# Patient Record
Sex: MALE | Race: WHITE | NOT HISPANIC OR LATINO | Employment: FULL TIME | URBAN - METROPOLITAN AREA
[De-identification: names, ages, dates, MRNs, and addresses within clinical notes are randomized per-mention and may not be internally consistent; named-entity substitution may affect disease eponyms.]

---

## 2018-01-27 ENCOUNTER — OFFICE VISIT (OUTPATIENT)
Dept: FAMILY MEDICINE | Facility: CLINIC | Age: 28
End: 2018-01-27

## 2018-01-27 VITALS
BODY MASS INDEX: 27.8 KG/M2 | HEIGHT: 70 IN | SYSTOLIC BLOOD PRESSURE: 126 MMHG | DIASTOLIC BLOOD PRESSURE: 86 MMHG | WEIGHT: 194.2 LBS | HEART RATE: 79 BPM | TEMPERATURE: 98.5 F | RESPIRATION RATE: 18 BRPM

## 2018-01-27 DIAGNOSIS — F33.1 MODERATE EPISODE OF RECURRENT MAJOR DEPRESSIVE DISORDER (H): Primary | ICD-10-CM

## 2018-01-27 DIAGNOSIS — R03.0 ELEVATED BLOOD-PRESSURE READING WITHOUT DIAGNOSIS OF HYPERTENSION: ICD-10-CM

## 2018-01-27 PROCEDURE — 99213 OFFICE O/P EST LOW 20 MIN: CPT | Performed by: FAMILY MEDICINE

## 2018-01-27 RX ORDER — CHLORAL HYDRATE 500 MG
CAPSULE ORAL
COMMUNITY

## 2018-01-27 RX ORDER — FLUOXETINE 40 MG/1
40 CAPSULE ORAL DAILY
Qty: 30 CAPSULE | Refills: 0 | Status: SHIPPED | OUTPATIENT
Start: 2018-01-27 | End: 2018-02-22

## 2018-01-27 ASSESSMENT — PATIENT HEALTH QUESTIONNAIRE - PHQ9
10. IF YOU CHECKED OFF ANY PROBLEMS, HOW DIFFICULT HAVE THESE PROBLEMS MADE IT FOR YOU TO DO YOUR WORK, TAKE CARE OF THINGS AT HOME, OR GET ALONG WITH OTHER PEOPLE: VERY DIFFICULT
SUM OF ALL RESPONSES TO PHQ QUESTIONS 1-9: 10
SUM OF ALL RESPONSES TO PHQ QUESTIONS 1-9: 10

## 2018-01-27 ASSESSMENT — ANXIETY QUESTIONNAIRES
4. TROUBLE RELAXING: SEVERAL DAYS
GAD7 TOTAL SCORE: 7
GAD7 TOTAL SCORE: 7
5. BEING SO RESTLESS THAT IT IS HARD TO SIT STILL: NOT AT ALL
1. FEELING NERVOUS, ANXIOUS, OR ON EDGE: SEVERAL DAYS
GAD7 TOTAL SCORE: 7
7. FEELING AFRAID AS IF SOMETHING AWFUL MIGHT HAPPEN: SEVERAL DAYS
7. FEELING AFRAID AS IF SOMETHING AWFUL MIGHT HAPPEN: SEVERAL DAYS
6. BECOMING EASILY ANNOYED OR IRRITABLE: MORE THAN HALF THE DAYS
3. WORRYING TOO MUCH ABOUT DIFFERENT THINGS: SEVERAL DAYS
2. NOT BEING ABLE TO STOP OR CONTROL WORRYING: SEVERAL DAYS

## 2018-01-27 NOTE — NURSING NOTE
2nd blood pressure reading 126/86, right arm, sitting in chair, regular sized cuff  Patient instructed to follow up as directed by MD Jon Niño, MA

## 2018-01-27 NOTE — MR AVS SNAPSHOT
After Visit Summary   1/27/2018    Diogenes Cody    MRN: [id number]           Patient Information     Date Of Birth          1990        Visit Information        Provider Department      1/27/2018 11:20 AM Kyung Ryan MD [facility]        Today's Diagnoses     Moderate episode of recurrent major depressive disorder (H)    -  1    Elevated blood-pressure reading without diagnosis of hypertension          Care Instructions      Eating Heart-Healthy Foods  Eating has a big impact on your heart health. In fact, eating healthier can improve several of your heart risks at once. For instance, it helps you manage weight, cholesterol, and blood pressure. Here are ideas to help you make heart-healthy changes without giving up all the foods and flavors you love.  Getting started    Talk with your health care provider about eating plans, such as the DASH or Mediterranean diet. You may also be referred to a dietitian.    Change a few things at a time. Give yourself time to get used to a few eating changes before adding more.    Work to create a tasty, healthy eating plan that you can stick to for the rest of your life.    Goals for healthy eating  Below are some tips to improve your eating habits:    Limit saturated fats and trans fats. Saturated fats raise your levels of cholesterol, so keep these fats to a minimum. They are found in foods such as fatty meats, whole milk, cheese, and palm and coconut oils. Avoid trans fats because they lower good cholesterol as well as raise bad cholesterol. Trans fats are most often found in processed foods.    Reduce sodium (salt) intake. Eating too much salt may increase your blood pressure. Limit your sodium intake to 2,300 milligrams (mg) per day, or less if your health care provider recommends it. Dining out less often and eating fewer processed foods are two great ways to decrease the amount of salt you consume.    Managing calories. A  calorie is a unit of energy. Your body burns calories for fuel, but if you eat more calories than your body burns, the extras are stored as fat. Your health care provider can help you create a diet plan to manage your calories. This will likely include eating healthier foods as well as exercising regularly. To help you track your progress, keep a diary to record what you eat and how often you exercise.  Choose the right foods  Aim to make these foods staples of your diet. If you have diabetes, you may have different recommendations than what is listed here:    Fruits and vegetable provide plenty of nutrients without a lot of calories. At meals, fill half your plate with these foods. Split the other half of your plate between whole grains and lean protein.    Whole grains are high in fiber and rich in vitamins and nutrients. Good choices include whole-wheat bread, pasta, and brown rice.    Lean proteins give you nutrition with less fat. Good choices include fish, skinless chicken, and beans.    Low-fat or nonfat dairy provides nutrients without a lot of fat. Try low-fat or nonfat milk, cheese, or yogurt.    Healthy fats can be good for you in small amounts. These are unsaturated fats, such as olive oil, nuts, and fish. Try to have at least 2 servings per week of fatty fish such as salmon, sardines, mackerel, rainbow trout, and albacore tuna. These contain omega-3 fatty acids, which are good for your heart. Flaxseed is another source of a heart-healthy fat.  More on heart healthy eating    Read food labels  Healthy eating starts at the grocery store. Be sure to pay attention to food labels on packaged foods. Look for products that are high in fiber and protein, and low in saturated fat, cholesterol, and sodium. Avoid products that contain trans fat. And pay close attention to serving size. For instance, if you plan to eat two servings, double all the numbers on the label.  Prepare food right  A key part of healthy  cooking is cutting down on added fat and salt. Look on the internet for lower-fat, lower-sodium recipes. Also, try these tips:    Remove fat from meat and skin from poultry before cooking.    Skim fat from the surface of soups and sauces.    Broil, boil, bake, steam, grill, and microwave food without added fats.    Choose ingredients that spice up your food without adding calories, fat, or sodium. Try these items: horseradish, hot sauce, lemon, mustard, nonfat salad dressings, and vinegar. For salt-free herbs and spices, try basil, cilantro, cinnamon, pepper, and rosemary.  Date Last Reviewed: 6/25/2015 2000-2017 [employer]. [address] [place] [address]. All rights reserved. This information is not intended as a substitute for professional medical care. Always follow your healthcare professional's instructions.                Follow-ups after your visit        Follow-up notes from your care team     Return in about 2 weeks (around 2/10/2018).      Who to contact     If you have questions or need follow up information about today's clinic visit or your schedule please contact [facility] directly at [phone].  Normal or non-critical lab and imaging results will be communicated to you by [employer], letter or phone within 4 business days after the clinic has received the results. If you do not hear from us within 7 days, please contact the clinic through [employer] or phone. If you have a critical or abnormal lab result, we will notify you by phone as soon as possible.  Submit refill requests through [employer] or call your pharmacy and they will forward the refill request to us. Please allow 3 business days for your refill to be completed.          Additional Information About Your Visit        [employer] Information     [employer] lets you send messages to your doctor, view your test results, renew your prescriptions, schedule appointments and more. To sign up, go to  [email or web address] . Click on \"Log in\" on the left side of the screen, which will take you to the Welcome page. Then click on \"Sign up Now\" on the right side of the page.     You will be asked to enter the access code listed below, as well as some personal information. Please follow the directions to create your username and password.     Your access code is: [email or web address]  Expires: 2018 11:50 AM     Your access code will  in 90 days. If you need help or a new code, please call your [place] [facility] clinic or [phone].        Care EveryWhere ID     This is your Care EveryWhere ID. This could be used by other organizations to access your [facility] medical records  [id number]        Your Vitals Were     Pulse Temperature Respirations Height BMI (Body Mass Index)       79 98.5  F (36.9  C) (Oral) 18 5' 9.5\" (1.765 m) 28.27 kg/m2        Blood Pressure from Last 3 Encounters:   18 140/90   14 126/85    Weight from Last 3 Encounters:   18 194 lb 3.2 oz (88.1 kg)              Today, you had the following     No orders found for display         Today's Medication Changes          These changes are accurate as of 18 11:50 AM.  If you have any questions, ask your nurse or doctor.               These medicines have changed or have updated prescriptions.        Dose/Directions    * FLUoxetine 20 MG capsule   Commonly known as:  PROzac   This may have changed:  Another medication with the same name was added. Make sure you understand how and when to take each.   Changed by:  Kyung Ryan MD        TAKE 1 CAPSULE BY MOUTH DAILY   Refills:  8       * FLUoxetine 40 MG capsule   Commonly known as:  PROzac   This may have changed:  You were already taking a medication with the same name, and this prescription was added. Make sure you understand how and when to take each.   Used for:  Moderate episode of recurrent major depressive disorder (H)   Changed by:  Kyung Ryan MD     "    Dose:  40 mg   Take 1 capsule (40 mg) by mouth daily   Quantity:  30 capsule   Refills:  0       * Notice:  This list has 2 medication(s) that are the same as other medications prescribed for you. Read the directions carefully, and ask your doctor or other care provider to review them with you.         Where to get your medicines      These medications were sent to [place] [address] [place] [facility] [id number] [facility] [address]    Hours:  Tech issues with their phone system Phone:  [phone]     FLUoxetine 40 MG capsule                Primary Care Provider Fax #    Physician No Ref-Primary [phone]       No address on file        Equal Access to Services     : Hadliu Chowdhury, simba bermeo, rod tsealmajassi lucas, brandon fritz . So [facility] [phone].    ATENCIÓN: Si habla español, tiene a santiago disposición servicios gratuitos de asistencia lingüística. [facility] [phone].    We comply with applicable federal civil rights laws and [place] laws. We do not discriminate on the basis of race, color, national origin, age, disability, sex, sexual orientation, or gender identity.            Thank you!     Thank you for choosing [facility]  for your care. Our goal is always to provide you with excellent care. Hearing back from our patients is one way we can continue to improve our services. Please take a few minutes to complete the written survey that you may receive in the mail after your visit with us. Thank you!             Your Updated Medication List - Protect others around you: Learn how to safely use, store and throw away your medicines at www.disposemymeds.org.          This list is accurate as of 1/27/18 11:50 AM.  Always use your most recent med list.                   Brand Name Dispense Instructions for use Diagnosis    * FLUoxetine 20 MG capsule    PROzac     TAKE 1 CAPSULE BY MOUTH  DAILY        * FLUoxetine 40 MG capsule    PROzac    30 capsule    Take 1 capsule (40 mg) by mouth daily    Moderate episode of recurrent major depressive disorder (H)       * OMEGA-3 FISH OIL PO           * fish oil-omega-3 fatty acids 1000 MG capsule           OMEPRAZOLE PO           * Notice:  This list has 4 medication(s) that are the same as other medications prescribed for you. Read the directions carefully, and ask your doctor or other care provider to review them with you.

## 2018-01-27 NOTE — NURSING NOTE
"Chief Complaint   Patient presents with     Recheck Medication       Initial /90 (BP Location: Right arm, Patient Position: Chair, Cuff Size: Adult Regular)  Pulse 79  Temp 98.5  F (36.9  C) (Oral)  Resp 18  Ht 5' 9.5\" (1.765 m)  Wt 194 lb 3.2 oz (88.1 kg)  BMI 28.27 kg/m2 Estimated body mass index is 28.27 kg/(m^2) as calculated from the following:    Height as of this encounter: 5' 9.5\" (1.765 m).    Weight as of this encounter: 194 lb 3.2 oz (88.1 kg).  Medication Reconciliation: complete      Health Maintenance addressed:  NONE    N/a    SERAFIN Martinez        "

## 2018-01-27 NOTE — PROGRESS NOTES
"  SUBJECTIVE:   Diogenes Cody is a 27 year old male who presents to clinic today for the following health issues:      Answers for HPI/ROS submitted by the patient on 1/27/2018   Chronic problems general questions HPI Form  Depression/Anxiety: Depression only  Status since last visit:: Stable  Other associated symptoms of depression:: None  Significant life event:: No  Current substance use:: Alcohol, Other  Anxiety/Panic symptoms:: No  If you checked off any problems, how difficult have these problems made it for you to do your work, take care of things at home, or get along with other people?: Very difficult  PHQ9 TOTAL SCORE: 10  RADHA 7 TOTAL SCORE: 7      Medication Followup of prozac    Taking Medication as prescribed: yes    Side Effects:  None     Medication Helping Symptoms:  yes   Dad has a drinking problem, he feels he is self medicating for depression. His mom and sister take medication for depression.   Was told no more refills, unless he comes in. Wants to keep taking this med, he feels much better.    Problem list and histories reviewed & adjusted, as indicated.  Additional history: as documented    BP Readings from Last 3 Encounters:   01/27/18 126/86   11/08/14 126/85    Wt Readings from Last 3 Encounters:   01/27/18 194 lb 3.2 oz (88.1 kg)                  Labs reviewed in EPIC    Reviewed and updated as needed this visit by clinical staff  Tobacco  Allergies  Meds  Med Hx  Surg Hx  Fam Hx  Soc Hx      Reviewed and updated as needed this visit by Provider         ROS:  Constitutional, HEENT, cardiovascular, pulmonary, gi and gu systems are negative, except as otherwise noted.    OBJECTIVE:     /86 (BP Location: Right arm, Patient Position: Chair, Cuff Size: Adult Regular)  Pulse 79  Temp 98.5  F (36.9  C) (Oral)  Resp 18  Ht 5' 9.5\" (1.765 m)  Wt 194 lb 3.2 oz (88.1 kg)  BMI 28.27 kg/m2  Body mass index is 28.27 kg/(m^2).  GENERAL: healthy, alert and no distress  NECK: no " adenopathy, no asymmetry, masses, or scars and thyroid normal to palpation  RESP: lungs clear to auscultation - no rales, rhonchi or wheezes  CV: regular rate and rhythm, normal S1 S2, no S3 or S4, no murmur, click or rub, no peripheral edema and peripheral pulses strong    MS: no gross musculoskeletal defects noted, no edema    Diagnostic Test Results:  none     ASSESSMENT/PLAN:             1. Moderate episode of recurrent major depressive disorder (H)  Refilled, doing well  - FLUoxetine (PROZAC) 40 MG capsule; Take 1 capsule (40 mg) by mouth daily  Dispense: 30 capsule; Refill: 0    2. Elevated blood-pressure reading without diagnosis of hypertension  Diet , exercise, weight loss recommended, recheck in 2 wks      See Patient Instructions    Kyung Ryan MD  [facility]

## 2018-01-27 NOTE — PATIENT INSTRUCTIONS
Eating Heart-Healthy Foods  Eating has a big impact on your heart health. In fact, eating healthier can improve several of your heart risks at once. For instance, it helps you manage weight, cholesterol, and blood pressure. Here are ideas to help you make heart-healthy changes without giving up all the foods and flavors you love.  Getting started    Talk with your health care provider about eating plans, such as the DASH or Mediterranean diet. You may also be referred to a dietitian.    Change a few things at a time. Give yourself time to get used to a few eating changes before adding more.    Work to create a tasty, healthy eating plan that you can stick to for the rest of your life.    Goals for healthy eating  Below are some tips to improve your eating habits:    Limit saturated fats and trans fats. Saturated fats raise your levels of cholesterol, so keep these fats to a minimum. They are found in foods such as fatty meats, whole milk, cheese, and palm and coconut oils. Avoid trans fats because they lower good cholesterol as well as raise bad cholesterol. Trans fats are most often found in processed foods.    Reduce sodium (salt) intake. Eating too much salt may increase your blood pressure. Limit your sodium intake to 2,300 milligrams (mg) per day, or less if your health care provider recommends it. Dining out less often and eating fewer processed foods are two great ways to decrease the amount of salt you consume.    Managing calories. A calorie is a unit of energy. Your body burns calories for fuel, but if you eat more calories than your body burns, the extras are stored as fat. Your health care provider can help you create a diet plan to manage your calories. This will likely include eating healthier foods as well as exercising regularly. To help you track your progress, keep a diary to record what you eat and how often you exercise.  Choose the right foods  Aim to make these foods staples of your diet. If  you have diabetes, you may have different recommendations than what is listed here:    Fruits and vegetable provide plenty of nutrients without a lot of calories. At meals, fill half your plate with these foods. Split the other half of your plate between whole grains and lean protein.    Whole grains are high in fiber and rich in vitamins and nutrients. Good choices include whole-wheat bread, pasta, and brown rice.    Lean proteins give you nutrition with less fat. Good choices include fish, skinless chicken, and beans.    Low-fat or nonfat dairy provides nutrients without a lot of fat. Try low-fat or nonfat milk, cheese, or yogurt.    Healthy fats can be good for you in small amounts. These are unsaturated fats, such as olive oil, nuts, and fish. Try to have at least 2 servings per week of fatty fish such as salmon, sardines, mackerel, rainbow trout, and albacore tuna. These contain omega-3 fatty acids, which are good for your heart. Flaxseed is another source of a heart-healthy fat.  More on heart healthy eating    Read food labels  Healthy eating starts at the grocery store. Be sure to pay attention to food labels on packaged foods. Look for products that are high in fiber and protein, and low in saturated fat, cholesterol, and sodium. Avoid products that contain trans fat. And pay close attention to serving size. For instance, if you plan to eat two servings, double all the numbers on the label.  Prepare food right  A key part of healthy cooking is cutting down on added fat and salt. Look on the internet for lower-fat, lower-sodium recipes. Also, try these tips:    Remove fat from meat and skin from poultry before cooking.    Skim fat from the surface of soups and sauces.    Broil, boil, bake, steam, grill, and microwave food without added fats.    Choose ingredients that spice up your food without adding calories, fat, or sodium. Try these items: horseradish, hot sauce, lemon, mustard, nonfat salad dressings,  and vinegar. For salt-free herbs and spices, try basil, cilantro, cinnamon, pepper, and rosemary.  Date Last Reviewed: 6/25/2015 2000-2017 [employer]. [address] [place] [address], [place] [address]. All rights reserved. This information is not intended as a substitute for professional medical care. Always follow your healthcare professional's instructions.    Call for refill in 30 days and recheck PHQ-9

## 2018-01-28 ASSESSMENT — PATIENT HEALTH QUESTIONNAIRE - PHQ9: SUM OF ALL RESPONSES TO PHQ QUESTIONS 1-9: 10

## 2018-01-28 ASSESSMENT — ANXIETY QUESTIONNAIRES: GAD7 TOTAL SCORE: 7

## 2018-02-22 DIAGNOSIS — F33.1 MODERATE EPISODE OF RECURRENT MAJOR DEPRESSIVE DISORDER (H): ICD-10-CM

## 2018-02-23 RX ORDER — FLUOXETINE 40 MG/1
CAPSULE ORAL
Qty: 30 CAPSULE | Refills: 0 | Status: SHIPPED | OUTPATIENT
Start: 2018-02-23 | End: 2018-02-26

## 2018-02-23 NOTE — TELEPHONE ENCOUNTER
PHQ-9 SCORE 1/27/2018   Total Score MyChart 10 (Moderate depression)   Total Score 10     Routing refill request to provider for review/approval because:  PHQ-9 > 4.  Amaya Duncan RN

## 2018-02-23 NOTE — TELEPHONE ENCOUNTER
"Last Written Prescription Date:  12/22/17 (Historical)  Last Fill Quantity:  Na ,  # refills: 8   Last office visit: 1/27/2018 with prescribing provider:  Shelby     Future Office Visit:    Requested Prescriptions   Pending Prescriptions Disp Refills     FLUoxetine (PROZAC) 40 MG capsule [Pharmacy Med Name: FLUOXETINE HCL 40 MG CAPSULE] 30 capsule 0     Sig: TAKE 1 CAPSULE (40 MG) BY MOUTH DAILY    SSRIs Protocol Failed    2/23/2018  9:20 AM       Failed - PHQ-9 score less than 5 in past 6 months    The PHQ-9 criteria is meant to fail. It requires a PHQ-9 score review         Passed - Patient is age 18 or older       Passed - Recent (6 mo) or future visit with authorizing provider's specialty    Patient had office visit in the last 6 months or has a visit in the next 30 days with authorizing provider.  See \"Patient Info\" tab in inbasket, or \"Choose Columns\" in Meds & Orders section of the refill encounter.              "

## 2018-02-26 ENCOUNTER — OFFICE VISIT (OUTPATIENT)
Dept: FAMILY MEDICINE | Facility: CLINIC | Age: 28
End: 2018-02-26
Payer: MEDICAID

## 2018-02-26 VITALS
HEART RATE: 79 BPM | RESPIRATION RATE: 20 BRPM | DIASTOLIC BLOOD PRESSURE: 84 MMHG | WEIGHT: 208.3 LBS | OXYGEN SATURATION: 97 % | BODY MASS INDEX: 30.32 KG/M2 | SYSTOLIC BLOOD PRESSURE: 126 MMHG | TEMPERATURE: 98 F

## 2018-02-26 DIAGNOSIS — R74.8 ELEVATED LIVER ENZYMES: ICD-10-CM

## 2018-02-26 DIAGNOSIS — F33.1 MODERATE EPISODE OF RECURRENT MAJOR DEPRESSIVE DISORDER (H): Primary | ICD-10-CM

## 2018-02-26 DIAGNOSIS — R03.0 ELEVATED BLOOD-PRESSURE READING WITHOUT DIAGNOSIS OF HYPERTENSION: ICD-10-CM

## 2018-02-26 DIAGNOSIS — E66.9 OBESITY WITHOUT SERIOUS COMORBIDITY, UNSPECIFIED CLASSIFICATION, UNSPECIFIED OBESITY TYPE: ICD-10-CM

## 2018-02-26 LAB
ALBUMIN SERPL-MCNC: 3.9 G/DL (ref 3.4–5)
ALP SERPL-CCNC: 81 U/L (ref 40–150)
ALT SERPL W P-5'-P-CCNC: 116 U/L (ref 0–70)
ANION GAP SERPL CALCULATED.3IONS-SCNC: 5 MMOL/L (ref 3–14)
AST SERPL W P-5'-P-CCNC: 46 U/L (ref 0–45)
BILIRUB SERPL-MCNC: 0.7 MG/DL (ref 0.2–1.3)
BUN SERPL-MCNC: 10 MG/DL (ref 7–30)
CALCIUM SERPL-MCNC: 8.8 MG/DL (ref 8.5–10.1)
CHLORIDE SERPL-SCNC: 108 MMOL/L (ref 94–109)
CHOLEST SERPL-MCNC: 230 MG/DL
CO2 SERPL-SCNC: 28 MMOL/L (ref 20–32)
CREAT SERPL-MCNC: 0.9 MG/DL (ref 0.66–1.25)
ERYTHROCYTE [DISTWIDTH] IN BLOOD BY AUTOMATED COUNT: 14.5 % (ref 10–15)
GFR SERPL CREATININE-BSD FRML MDRD: >90 ML/MIN/1.7M2
GLUCOSE SERPL-MCNC: 78 MG/DL (ref 70–99)
HCT VFR BLD AUTO: 48.7 % (ref 40–53)
HDLC SERPL-MCNC: 46 MG/DL
HGB BLD-MCNC: 16.2 G/DL (ref 13.3–17.7)
LDLC SERPL CALC-MCNC: 148 MG/DL
MCH RBC QN AUTO: 29 PG (ref 26.5–33)
MCHC RBC AUTO-ENTMCNC: 33.3 G/DL (ref 31.5–36.5)
MCV RBC AUTO: 87 FL (ref 78–100)
NONHDLC SERPL-MCNC: 184 MG/DL
PLATELET # BLD AUTO: 206 10E9/L (ref 150–450)
POTASSIUM SERPL-SCNC: 4.3 MMOL/L (ref 3.4–5.3)
PROT SERPL-MCNC: 7.1 G/DL (ref 6.8–8.8)
RBC # BLD AUTO: 5.58 10E12/L (ref 4.4–5.9)
SODIUM SERPL-SCNC: 141 MMOL/L (ref 133–144)
TRIGL SERPL-MCNC: 178 MG/DL
TSH SERPL DL<=0.005 MIU/L-ACNC: 0.97 MU/L (ref 0.4–4)
WBC # BLD AUTO: 5.5 10E9/L (ref 4–11)

## 2018-02-26 PROCEDURE — 80061 LIPID PANEL: CPT | Performed by: FAMILY MEDICINE

## 2018-02-26 PROCEDURE — 84443 ASSAY THYROID STIM HORMONE: CPT | Performed by: FAMILY MEDICINE

## 2018-02-26 PROCEDURE — 99214 OFFICE O/P EST MOD 30 MIN: CPT | Performed by: FAMILY MEDICINE

## 2018-02-26 PROCEDURE — 80053 COMPREHEN METABOLIC PANEL: CPT | Performed by: FAMILY MEDICINE

## 2018-02-26 PROCEDURE — 85027 COMPLETE CBC AUTOMATED: CPT | Performed by: FAMILY MEDICINE

## 2018-02-26 PROCEDURE — 36415 COLL VENOUS BLD VENIPUNCTURE: CPT | Performed by: FAMILY MEDICINE

## 2018-02-26 RX ORDER — FLUOXETINE 40 MG/1
CAPSULE ORAL
Qty: 90 CAPSULE | Refills: 1 | Status: SHIPPED | OUTPATIENT
Start: 2018-02-26 | End: 2018-07-18

## 2018-02-26 NOTE — NURSING NOTE
"Chief Complaint   Patient presents with     Recheck Medication     prozac       Initial /84 (BP Location: Right arm, Patient Position: Chair, Cuff Size: Adult Large)  Pulse 79  Temp 98  F (36.7  C) (Oral)  Resp 20  Wt 208 lb 4.8 oz (94.5 kg)  SpO2 97%  BMI 30.32 kg/m2 Estimated body mass index is 30.32 kg/(m^2) as calculated from the following:    Height as of 1/27/18: 5' 9.5\" (1.765 m).    Weight as of this encounter: 208 lb 4.8 oz (94.5 kg).  Medication Reconciliation: complete   Cassidy Dunn CMA (AAMA)  "

## 2018-02-26 NOTE — MR AVS SNAPSHOT
After Visit Summary   2/26/2018    Diogenes Cody    MRN: [id number]           Patient Information     Date Of Birth          1990        Visit Information        Provider Department      2/26/2018 7:20 AM Kyung Ryan MD [facility]        Today's Diagnoses     Moderate episode of recurrent major depressive disorder (H)    -  1    Elevated blood-pressure reading without diagnosis of hypertension        Obesity without serious comorbidity, unspecified classification, unspecified obesity type          Care Instructions      Eating Heart-Healthy Food: Using the DASH Plan    Eating for your heart doesn t have to be hard or boring. You just need to know how to make healthier choices. The DASH eating plan has been developed to help you do just that. DASH stands for Dietary Approaches to Stop Hypertension. It is a plan that has been proven to be healthier for your heart and to lower your risk for high blood pressure. It can also help lower your risk for cancer, heart disease, osteoporosis, and diabetes.  Choosing from each food group  Choose foods from each of the food groups below each day. Try to get the recommended number of servings for each food group. The serving numbers are based on a diet of 2,000 calories a day. Talk to your doctor if you re unsure about your calorie needs. Along with getting the correct servings, the DASH plan also recommends a sodium intake less than 2,300 mg per day.        Grains  Servings: 6 to 8 a day  A serving is:    1 slice bread    1 ounce dry cereal    Half a cup cooked rice, pasta or cereal  Best choices: Whole grains and any grains high in fiber. Vegetables  Servings: 4 to 5 a day  A serving is:    1 cup raw leafy vegetable    Half a cup cut-up raw or cooked vegetable    Half a cup vegetable juice  Best choices: Fresh or frozen vegetables prepared without added salt or fat.   Fruits  Servings: 4 to 5 a day  A serving is:    1 medium  fruit    One-quarter cup dried fruit    Half a cup fresh, frozen, or canned fruit    Half a cup of 100% fruit juices  Best choices: A variety of fresh fruits of different colors. Whole fruits are a better choice than fruit juices. Low-fat or fat-free dairy  Servings: 2 to 3 a day  A serving is:    1 cup milk    1 cup yogurt    One and a half ounces cheese  Best choices: Skim or 1% milk, low-fat or fat-free yogurt or buttermilk, and low-fat cheeses.         Lean meats, poultry, fish  Servings: 6 or fewer a day  A serving is:    1 ounce cooked meats, poultry, or fish    1 egg  Best choices: Lean poultry and fish. Trim away visible fat. Broil, grill, roast, or boil instead of frying. Remove skin from poultry before eating. Limit how much red meat you eat.  Nuts, seeds, beans  Servings: 4 to 5 a week  A serving is:    One-third cup nuts (one and a half ounces)    2 tablespoons nut butter or seeds    Half a cup cooked dry beans or legumes  Best choices: Dry roasted nuts with no salt added, lentils, kidney beans, garbanzo beans, and whole do beans.   Fats and oils  Servings: 2 to 3 a day  A serving is:    1 teaspoon vegetable oil    1 teaspoon soft margarine    1 tablespoon mayonnaise    2 tablespoons salad dressing  Best choices: Nut and vegetable oils (nontropical vegetable oils), such as olive and canola oil. Sweets  Servings: 5 a week or fewer  A serving is:    1 tablespoon sugar, maple syrup, or honey    1 tablespoon jam or jelly    1 half-ounce jelly beans (about 15)    1 cup lemonade  Best choices: Dried fruit can be a satisfying sweet. Choose low-fat sweets. And watch your serving sizes!      For more on the DASH eating plan, visit:  www.nhlbi.nih.gov/health/health-topics/topics/dash   Date Last Reviewed: 6/1/2016 2000-2017 [employer]. [address] [place] [address], [place] [address]. All rights reserved. This information is not intended as a substitute for professional medical care. Always follow  your healthcare professional's instructions.                Follow-ups after your visit        Follow-up notes from your care team     Return in about 6 months (around 8/26/2018).      Who to contact     If you have questions or need follow up information about today's clinic visit or your schedule please contact [facility] directly at [phone].  Normal or non-critical lab and imaging results will be communicated to you by MyChart, letter or phone within 4 business days after the clinic has received the results. If you do not hear from us within 7 days, please contact the clinic through [employer] or phone. If you have a critical or abnormal lab result, we will notify you by phone as soon as possible.  Submit refill requests through [employer] or call your pharmacy and they will forward the refill request to us. Please allow 3 business days for your refill to be completed.          Additional Information About Your Visit        [employer] Information     [employer] gives you secure access to your electronic health record. If you see a primary care provider, you can also send messages to your care team and make appointments. If you have questions, please call your primary care clinic.  If you do not have a primary care provider, please call [phone] and they will assist you.        Care EveryWhere ID     This is your Care EveryWhere ID. This could be used by other organizations to access your [place] medical records  [id number]        Your Vitals Were     Pulse Temperature Respirations Pulse Oximetry BMI (Body Mass Index)       79 98  F (36.7  C) (Oral) 20 97% 30.32 kg/m2        Blood Pressure from Last 3 Encounters:   02/26/18 126/84   01/27/18 126/86   11/08/14 126/85    Weight from Last 3 Encounters:   02/26/18 208 lb 4.8 oz (94.5 kg)   01/27/18 194 lb 3.2 oz (88.1 kg)              We Performed the Following     CBC with platelets     Comprehensive metabolic panel     Lipid panel reflex to direct  LDL Fasting     TSH with free T4 reflex          Today's Medication Changes          These changes are accurate as of 2/26/18  7:47 AM.  If you have any questions, ask your nurse or doctor.               These medicines have changed or have updated prescriptions.        Dose/Directions    FLUoxetine 40 MG capsule   Commonly known as:  PROzac   This may have changed:  See the new instructions.   Used for:  Moderate episode of recurrent major depressive disorder (H)   Changed by:  Kyung Ryan MD        TAKE 1 CAPSULE (40 MG) BY MOUTH DAILY   Quantity:  90 capsule   Refills:  1            Where to get your medicines      These medications were sent to [place] [address] [place] [facility] [address] [facility]  [address] [facility] [address]    Hours:  Tech issues with their phone system Phone:  [phone]     FLUoxetine 40 MG capsule                Primary Care Provider Office Phone # Fax #    Kyung Ryan [phone]  [address]   [facility] [address]        Equal Access to Services     M[facility] Center[facility]MINIO AH: Hadii aad ku hadasho Soomaali, waaxda luqadaha, qaybta kaalmada adeegyada, waxay idiin hayaan jhony bonnerarajerry fritz . So [facility] [phone].    ATENCIÓN: Si habla español, tiene a santiago disposición servicios gratuitos de asistencia lingüística. Llame al [phone].    We comply with applicable federal civil rights laws and [place] laws. We do not discriminate on the basis of race, color, national origin, age, disability, sex, sexual orientation, or gender identity.            Thank you!     Thank you for choosing [facility]  for your care. Our goal is always to provide you with excellent care. Hearing back from our patients is one way we can continue to improve our services. Please take a few minutes to complete the written survey that you may receive in the mail after your visit with us. Thank you!             Your Updated Medication  List - Protect others around you: Learn how to safely use, store and throw away your medicines at www.disposemymeds.org.          This list is accurate as of 2/26/18  7:47 AM.  Always use your most recent med list.                   Brand Name Dispense Instructions for use Diagnosis    fish oil-omega-3 fatty acids 1000 MG capsule           FLUoxetine 40 MG capsule    PROzac    90 capsule    TAKE 1 CAPSULE (40 MG) BY MOUTH DAILY    Moderate episode of recurrent major depressive disorder (H)       OMEPRAZOLE PO

## 2018-02-26 NOTE — PATIENT INSTRUCTIONS
Eating Heart-Healthy Food: Using the DASH Plan    Eating for your heart doesn t have to be hard or boring. You just need to know how to make healthier choices. The DASH eating plan has been developed to help you do just that. DASH stands for Dietary Approaches to Stop Hypertension. It is a plan that has been proven to be healthier for your heart and to lower your risk for high blood pressure. It can also help lower your risk for cancer, heart disease, osteoporosis, and diabetes.  Choosing from each food group  Choose foods from each of the food groups below each day. Try to get the recommended number of servings for each food group. The serving numbers are based on a diet of 2,000 calories a day. Talk to your doctor if you re unsure about your calorie needs. Along with getting the correct servings, the DASH plan also recommends a sodium intake less than 2,300 mg per day.        Grains  Servings: 6 to 8 a day  A serving is:    1 slice bread    1 ounce dry cereal    Half a cup cooked rice, pasta or cereal  Best choices: Whole grains and any grains high in fiber. Vegetables  Servings: 4 to 5 a day  A serving is:    1 cup raw leafy vegetable    Half a cup cut-up raw or cooked vegetable    Half a cup vegetable juice  Best choices: Fresh or frozen vegetables prepared without added salt or fat.   Fruits  Servings: 4 to 5 a day  A serving is:    1 medium fruit    One-quarter cup dried fruit    Half a cup fresh, frozen, or canned fruit    Half a cup of 100% fruit juices  Best choices: A variety of fresh fruits of different colors. Whole fruits are a better choice than fruit juices. Low-fat or fat-free dairy  Servings: 2 to 3 a day  A serving is:    1 cup milk    1 cup yogurt    One and a half ounces cheese  Best choices: Skim or 1% milk, low-fat or fat-free yogurt or buttermilk, and low-fat cheeses.         Lean meats, poultry, fish  Servings: 6 or fewer a day  A serving is:    1 ounce cooked meats, poultry, or fish    1  egg  Best choices: Lean poultry and fish. Trim away visible fat. Broil, grill, roast, or boil instead of frying. Remove skin from poultry before eating. Limit how much red meat you eat.  Nuts, seeds, beans  Servings: 4 to 5 a week  A serving is:    One-third cup nuts (one and a half ounces)    2 tablespoons nut butter or seeds    Half a cup cooked dry beans or legumes  Best choices: Dry roasted nuts with no salt added, lentils, kidney beans, garbanzo beans, and whole do beans.   Fats and oils  Servings: 2 to 3 a day  A serving is:    1 teaspoon vegetable oil    1 teaspoon soft margarine    1 tablespoon mayonnaise    2 tablespoons salad dressing  Best choices: Nut and vegetable oils (nontropical vegetable oils), such as olive and canola oil. Sweets  Servings: 5 a week or fewer  A serving is:    1 tablespoon sugar, maple syrup, or honey    1 tablespoon jam or jelly    1 half-ounce jelly beans (about 15)    1 cup lemonade  Best choices: Dried fruit can be a satisfying sweet. Choose low-fat sweets. And watch your serving sizes!      For more on the DASH eating plan, visit:  www.nhlbi.nih.gov/health/health-topics/topics/dash   Date Last Reviewed: 6/1/2016 2000-2017 [employer]. [address] [place] [address], [place] [address]. All rights reserved. This information is not intended as a substitute for professional medical care. Always follow your healthcare professional's instructions.

## 2018-02-26 NOTE — PROGRESS NOTES
"  SUBJECTIVE:   Diogenes Cody is a 27 year old male who presents to clinic today for the following health issues:    Medication Followup of Prozac    Taking Medication as prescribed: yes    Side Effects:  None    Medication Helping Symptoms:  yes     Pt is here for Prozac med check. Says the medication is working well. No side effects noted. Has been on the medication for about 2-3 years now. At last appt, dose was increased from 20 to 40 mg. Pt reports no changes with dose increase in medication. Recently tried going off of Prozac for a few days and says that those days were \"miserable.\"    FHx  Does not believe he has a family history of high blood pressure.     Diet  Says that he generally has a good diet, but was on the road for a long trip last week and ate mostly fast food. Pt says he has not tried dieting or weight loss in the past, but is thinking of doing so now as he has noted his weight going up. Drinks whole milk and also drinks a lot of sodas (about 3-4 20 mL servings a day).     Blood pressure  BP slightly elevated in clinic today (126/84), and has been in the past as well. Pt accepts blood work today for further evaluation of elevated BP.     Smoking  Pt smokes E-cigarettes every day but has quit tobacco smoking.     Alcohol   Has a couple drinks a night.     SOC  Pt works full time. Has a flexible schedule that allows him to come to the clinic as needed.     PHQ-9: 1 (on feeling down, depressed, or hopeless), somewhat difficult.     PROBLEMS TO ADD ON...    Problem list and histories reviewed & adjusted, as indicated.  Additional history: as documented    BP Readings from Last 3 Encounters:   02/26/18 126/84   01/27/18 126/86   11/08/14 126/85    Wt Readings from Last 3 Encounters:   02/26/18 94.5 kg (208 lb 4.8 oz)   01/27/18 88.1 kg (194 lb 3.2 oz)         Labs reviewed in EPIC    Reviewed and updated as needed this visit by clinical staff  Allergies  Meds       Reviewed and updated as needed " this visit by Provider       ROS:  Constitutional, HEENT, cardiovascular, pulmonary, gi and gu systems are negative, except as otherwise noted.    NEGATIVE: chest pressure, chest pain.   POSITIVE: occasional, infrequent headaches.     This document serves as a record of the services and decisions personally performed and made by Kyung Ryan MD. It was created on his/her behalf by Chris Thibodeaux, a trained medical scribe. The creation of this document is based the provider's statements to the medical scribe.  Scribe Chris Thibodeaux 7:26 AM, February 26, 2018  OBJECTIVE:     /84 (BP Location: Right arm, Patient Position: Chair, Cuff Size: Adult Large)  Pulse 79  Temp 98  F (36.7  C) (Oral)  Resp 20  Wt 94.5 kg (208 lb 4.8 oz)  SpO2 97%  BMI 30.32 kg/m2  Body mass index is 30.32 kg/(m^2).  GENERAL: healthy, alert and no distress  EYES: Eyes grossly normal to inspection, conjunctivae and sclerae normal  RESP: lungs clear to auscultation - no rales, rhonchi or wheezes  CV: regular rate and rhythm, normal S1 S2, no S3 or S4, no murmur, click or rub, no peripheral edema and peripheral pulses strong  MS: no gross musculoskeletal defects noted, no edema  SKIN: no suspicious lesions or rashes  NEURO: Normal strength and tone, mentation intact and speech normal  PSYCH: mentation appears normal, affect normal/bright    Diagnostic Test Results:  none     ASSESSMENT/PLAN:     (F33.1) Moderate episode of recurrent major depressive disorder (H)  (primary encounter diagnosis)  Comment: Controlled on Prozac, no side effects or issues reported, continue on medication.   Plan: FLUoxetine (PROZAC) 40 MG capsule          (R03.0) Elevated blood-pressure reading without diagnosis of hypertension  Comment: Evaluate further with labs today. Advised pt on diet and exercise and the effects that weight might have on BP. Also advised him to decrease alcohol intake.   Plan: TSH with free T4 reflex, Comprehensive         metabolic  panel, CBC with platelets, Lipid         panel reflex to direct LDL Fasting          (E66.9) Obesity without serious comorbidity, unspecified classification, unspecified obesity type  Comment: Advised pt extensively on diet, DASH diet guidelines provided. Recommend decreasing alcohol, switching to skim milk from whole, and cutting back on sodas (perhaps use diet soda or splash of juice in bubbly water as a substitute for the time being if needed). Reduce salt intake.   Plan: Lipid panel reflex to direct LDL Fasting          RTC in 6 months for evaluation.     The information in this document, created by the medical scribe for me, accurately reflects the services I personally performed and the decisions made by me. I have reviewed and approved this document for accuracy prior to leaving the patient care area.  Kyung Ryan MD  7:26 AM, 02/26/18    Kyung Ryan MD  [facility]

## 2018-02-27 PROBLEM — R74.8 ELEVATED LIVER ENZYMES: Status: ACTIVE | Noted: 2018-02-27

## 2018-02-27 ASSESSMENT — PATIENT HEALTH QUESTIONNAIRE - PHQ9: SUM OF ALL RESPONSES TO PHQ QUESTIONS 1-9: 1

## 2018-03-06 ENCOUNTER — HOSPITAL ENCOUNTER (OUTPATIENT)
Dept: ULTRASOUND IMAGING | Facility: CLINIC | Age: 28
Discharge: HOME | End: 2018-03-06
Attending: FAMILY MEDICINE | Admitting: FAMILY MEDICINE
Payer: MEDICAID

## 2018-03-06 DIAGNOSIS — R74.8 ELEVATED LIVER ENZYMES: ICD-10-CM

## 2018-03-06 PROCEDURE — 76700 US EXAM ABDOM COMPLETE: CPT

## 2018-05-16 ENCOUNTER — OFFICE VISIT (OUTPATIENT)
Dept: FAMILY MEDICINE | Facility: CLINIC | Age: 28
End: 2018-05-16
Payer: MEDICAID

## 2018-05-16 VITALS
BODY MASS INDEX: 28.38 KG/M2 | TEMPERATURE: 98.7 F | SYSTOLIC BLOOD PRESSURE: 110 MMHG | WEIGHT: 195 LBS | HEART RATE: 105 BPM | RESPIRATION RATE: 16 BRPM | OXYGEN SATURATION: 97 % | DIASTOLIC BLOOD PRESSURE: 84 MMHG

## 2018-05-16 DIAGNOSIS — J02.0 ACUTE STREPTOCOCCAL PHARYNGITIS: Primary | ICD-10-CM

## 2018-05-16 DIAGNOSIS — L03.031 PARONYCHIA OF TOE, RIGHT: ICD-10-CM

## 2018-05-16 DIAGNOSIS — J02.9 ACUTE PHARYNGITIS, UNSPECIFIED ETIOLOGY: ICD-10-CM

## 2018-05-16 LAB
DEPRECATED S PYO AG THROAT QL EIA: ABNORMAL
SPECIMEN SOURCE: ABNORMAL

## 2018-05-16 PROCEDURE — 87880 STREP A ASSAY W/OPTIC: CPT | Performed by: NURSE PRACTITIONER

## 2018-05-16 PROCEDURE — 99213 OFFICE O/P EST LOW 20 MIN: CPT | Performed by: NURSE PRACTITIONER

## 2018-05-16 RX ORDER — AZITHROMYCIN 250 MG/1
TABLET, FILM COATED ORAL
Qty: 6 TABLET | Refills: 0 | Status: SHIPPED | OUTPATIENT
Start: 2018-05-16 | End: 2018-07-18

## 2018-05-16 NOTE — PROGRESS NOTES
"  SUBJECTIVE:   Diogenes Cody is a 27 year old male who presents to clinic today for the following health issues:      Acute Illness   Acute illness concerns: ST  Onset: 1-2 days     Fever: no    Chills/Sweats: YES    Headache (location?): YES    Sinus Pressure:no    Conjunctivitis:  no    Ear Pain: YES- ear drainage    Rhinorrhea: YES    Congestion: YES    Sore Throat: YES, very painful to swallow.      Cough: YES - slight     Wheeze: no    Decreased Appetite: YES    Nausea: YES    Vomiting: no    Diarrhea:  no    Dysuria/Freq.: no    Fatigue/Achiness: YES    Sick/Strep Exposure: YES- son has \"rectal strep\"     Therapies Tried and outcome: tylenol--last dose at 11:00 am today        Pulled a hang nail off of his R great toe recently.  Has had some swelling around the nail for the past few days.  Yesterday started soaking in Epsom salts and putting mupirocin cream on it.  Much improved today.      Problem list and histories reviewed & adjusted, as indicated.  Additional history: as documented    Current Outpatient Prescriptions   Medication Sig Dispense Refill     Cholecalciferol (VITAMIN D-3 PO)        fish oil-omega-3 fatty acids 1000 MG capsule        FLUoxetine (PROZAC) 40 MG capsule TAKE 1 CAPSULE (40 MG) BY MOUTH DAILY 90 capsule 1     OMEPRAZOLE PO        UNABLE TO FIND MEDICATION NAME: \"melthisel, medication for liver\"       Allergies   Allergen Reactions     Amoxicillin      Ceclor [Cefaclor]      Sulfa Drugs      Sulfasalazine Hives     Vantin [Cefpodoxime] Hives       Reviewed and updated as needed this visit by clinical staff  Tobacco  Allergies  Meds  Med Hx  Surg Hx  Fam Hx  Soc Hx      Reviewed and updated as needed this visit by Provider         ROS:  Constitutional, HEENT, cardiovascular, pulmonary, gi and gu systems are negative, except as otherwise noted.    OBJECTIVE:     /84 (BP Location: Right arm, Patient Position: Chair, Cuff Size: Adult Regular)  Pulse 105  Temp 98.7  F (37.1 "  C) (Oral)  Resp 16  Wt 195 lb (88.5 kg)  SpO2 97%  BMI 28.38 kg/m2  Body mass index is 28.38 kg/(m^2).  GENERAL: healthy, alert and no distress  EYES: Eyes grossly normal to inspection  HENT: ear canals and L TM normal, R TM injected, serous effusion, no bulging, nose and mouth without ulcers or lesions, oropharynx and tonsillar erythema, no exudate  NECK: no adenopathy, no asymmetry, masses, or scars and thyroid normal to palpation  RESP: lungs clear to auscultation - no rales, rhonchi or wheezes  CV: regular rate and rhythm, normal S1 S2, no S3 or S4, no murmur  SKIN: no suspicious lesions or rashes, R great with erythema and slight swelling along the lateral nail fold, slight tenderness      Diagnostic Test Results:  Results for orders placed or performed in visit on 05/16/18   Strep, Rapid Screen   Result Value Ref Range    Specimen Description Throat     Rapid Strep A Screen (A)      POSITIVE: Group A Streptococcal antigen detected by immunoassay.     ASSESSMENT/PLAN:   1. Acute pharyngitis, unspecified etiology  - Strep, Rapid Screen--POS    2. Acute streptococcal pharyngitis  Will treat with Zithromax given allergies.  Tylenol/ibuprofen as needed.    - azithromycin (ZITHROMAX) 250 MG tablet; Two tablets first day, then one tablet daily for four days.  Dispense: 6 tablet; Refill: 0    3. Paronychia of toe, right  Improving.  Continue to soak and mupirocin cream twice daily.      F/u as needed if no improvement in 3 days; sooner if worsening symptoms     YEYO Bishop [facility]

## 2018-05-16 NOTE — PATIENT INSTRUCTIONS
Continue soaking toe in epsom salts and can apply mupirocin cream twice daily.  If toe does not continue to improve over the next 3-5 days or is worsening please let me know.

## 2018-05-16 NOTE — MR AVS SNAPSHOT
After Visit Summary   5/16/2018    Diogenes Cody    MRN: [id number]           Patient Information     Date Of Birth          1990        Visit Information        Provider Department      5/16/2018 11:40 AM Carol Corbin APRN CNP [facility]        Today's Diagnoses     Acute pharyngitis    -  1    Acute streptococcal pharyngitis        Paronychia of toe, right          Care Instructions    Continue soaking toe in epsom salts and can apply mupirocin cream twice daily.  If toe does not continue to improve over the next 5 days or is worsening please let me know.            Follow-ups after your visit        Follow-up notes from your care team     Return in about 3 months (around 8/16/2018) for Mental Health Follow up.      Who to contact     If you have questions or need follow up information about today's clinic visit or your schedule please contact [facility] directly at [phone].  Normal or non-critical lab and imaging results will be communicated to you by MyChart, letter or phone within 4 business days after the clinic has received the results. If you do not hear from us within 7 days, please contact the clinic through Midatechhart or phone. If you have a critical or abnormal lab result, we will notify you by phone as soon as possible.  Submit refill requests through [employer] or call your pharmacy and they will forward the refill request to us. Please allow 3 business days for your refill to be completed.          Additional Information About Your Visit        MyChart Information     [employer] gives you secure access to your electronic health record. If you see a primary care provider, you can also send messages to your care team and make appointments. If you have questions, please call your primary care clinic.  If you do not have a primary care provider, please call [phone] and they will assist you.        Care EveryWhere ID     This is your Care  EveryWhere ID. This could be used by other organizations to access your [facility] medical records  [id number]        Your Vitals Were     Pulse Temperature Respirations Pulse Oximetry BMI (Body Mass Index)       105 98.7  F (37.1  C) (Oral) 16 97% 28.38 kg/m2        Blood Pressure from Last 3 Encounters:   05/16/18 110/84   02/26/18 126/84   01/27/18 126/86    Weight from Last 3 Encounters:   05/16/18 195 lb (88.5 kg)   02/26/18 208 lb 4.8 oz (94.5 kg)   01/27/18 194 lb 3.2 oz (88.1 kg)              We Performed the Following     Strep, Rapid Screen          Today's Medication Changes          These changes are accurate as of 5/16/18 12:12 PM.  If you have any questions, ask your nurse or doctor.               Start taking these medicines.        Dose/Directions    azithromycin 250 MG tablet   Commonly known as:  ZITHROMAX   Used for:  Acute streptococcal pharyngitis   Started by:  Carol Corbin APRN CNP        Two tablets first day, then one tablet daily for four days.   Quantity:  6 tablet   Refills:  0            Where to get your medicines      These medications were sent to [place] [id number] [address]    Hours:  Tech issues with their phone system Phone:  [phone]     azithromycin 250 MG tablet                Primary Care Provider Office Phone # Fax #    Kyung Edyta Ryan [phone]       [id number]  [address]   [facility] [address]        Equal Access to Services     MANDY VIDAL : Hadii rashmi arrington hadasho Soporscheali, waaxda luqadaha, qaybta kaalmada adeegyada, brandon idibrianna bolton. So [facility] [phone].    ATENCIÓN: Si habla español, tiene a santiago disposición servicios gratuitos de asistencia lingüística. Llame al [phone].    We comply with applicable federal civil rights laws and [place] laws. We do not discriminate on the basis of race, color, national origin, age, disability,  "sex, sexual orientation, or gender identity.            Thank you!     Thank you for choosing [facility]  for your care. Our goal is always to provide you with excellent care. Hearing back from our patients is one way we can continue to improve our services. Please take a few minutes to complete the written survey that you may receive in the mail after your visit with us. Thank you!             Your Updated Medication List - Protect others around you: Learn how to safely use, store and throw away your medicines at www.disposemymeds.org.          This list is accurate as of 5/16/18 12:12 PM.  Always use your most recent med list.                   Brand Name Dispense Instructions for use Diagnosis    azithromycin 250 MG tablet    ZITHROMAX    6 tablet    Two tablets first day, then one tablet daily for four days.    Acute streptococcal pharyngitis       fish oil-omega-3 fatty acids 1000 MG capsule           FLUoxetine 40 MG capsule    PROzac    90 capsule    TAKE 1 CAPSULE (40 MG) BY MOUTH DAILY    Moderate episode of recurrent major depressive disorder (H)       OMEPRAZOLE PO           UNABLE TO FIND      MEDICATION NAME: \"melthisel, medication for liver\"        VITAMIN D-3 PO             "

## 2018-05-17 ASSESSMENT — PATIENT HEALTH QUESTIONNAIRE - PHQ9: SUM OF ALL RESPONSES TO PHQ QUESTIONS 1-9: 2

## 2018-07-17 ENCOUNTER — MYC REFILL (OUTPATIENT)
Dept: FAMILY MEDICINE | Facility: CLINIC | Age: 28
End: 2018-07-17

## 2018-07-17 DIAGNOSIS — F33.1 MODERATE EPISODE OF RECURRENT MAJOR DEPRESSIVE DISORDER (H): ICD-10-CM

## 2018-07-17 RX ORDER — FLUOXETINE 40 MG/1
CAPSULE ORAL
Qty: 90 CAPSULE | Refills: 1 | Status: CANCELLED | OUTPATIENT
Start: 2018-07-17

## 2018-07-18 ENCOUNTER — OFFICE VISIT (OUTPATIENT)
Dept: FAMILY MEDICINE | Facility: CLINIC | Age: 28
End: 2018-07-18
Payer: COMMERCIAL

## 2018-07-18 VITALS
WEIGHT: 196.6 LBS | HEART RATE: 91 BPM | DIASTOLIC BLOOD PRESSURE: 86 MMHG | BODY MASS INDEX: 28.62 KG/M2 | TEMPERATURE: 99.2 F | SYSTOLIC BLOOD PRESSURE: 106 MMHG | OXYGEN SATURATION: 98 % | RESPIRATION RATE: 12 BRPM

## 2018-07-18 DIAGNOSIS — E66.9 OBESITY WITHOUT SERIOUS COMORBIDITY, UNSPECIFIED CLASSIFICATION, UNSPECIFIED OBESITY TYPE: ICD-10-CM

## 2018-07-18 DIAGNOSIS — F33.1 MODERATE EPISODE OF RECURRENT MAJOR DEPRESSIVE DISORDER (H): Primary | ICD-10-CM

## 2018-07-18 PROCEDURE — 99213 OFFICE O/P EST LOW 20 MIN: CPT | Performed by: NURSE PRACTITIONER

## 2018-07-18 RX ORDER — FLUOXETINE 40 MG/1
CAPSULE ORAL
Qty: 90 CAPSULE | Refills: 1 | Status: SHIPPED | OUTPATIENT
Start: 2018-07-18 | End: 2018-12-05

## 2018-07-18 ASSESSMENT — ANXIETY QUESTIONNAIRES
IF YOU CHECKED OFF ANY PROBLEMS ON THIS QUESTIONNAIRE, HOW DIFFICULT HAVE THESE PROBLEMS MADE IT FOR YOU TO DO YOUR WORK, TAKE CARE OF THINGS AT HOME, OR GET ALONG WITH OTHER PEOPLE: SOMEWHAT DIFFICULT
6. BECOMING EASILY ANNOYED OR IRRITABLE: SEVERAL DAYS
7. FEELING AFRAID AS IF SOMETHING AWFUL MIGHT HAPPEN: NOT AT ALL
2. NOT BEING ABLE TO STOP OR CONTROL WORRYING: NOT AT ALL
5. BEING SO RESTLESS THAT IT IS HARD TO SIT STILL: NOT AT ALL
1. FEELING NERVOUS, ANXIOUS, OR ON EDGE: NOT AT ALL
GAD7 TOTAL SCORE: 1
3. WORRYING TOO MUCH ABOUT DIFFERENT THINGS: NOT AT ALL

## 2018-07-18 ASSESSMENT — PATIENT HEALTH QUESTIONNAIRE - PHQ9: 5. POOR APPETITE OR OVEREATING: NOT AT ALL

## 2018-07-18 NOTE — LETTER
My Depression Action Plan  Name: Diogenes Cody   Date of Birth 1990  Date: 7/18/2018    My doctor: Kyung Ryan   My clinic: [address], [address]  [facility] [address] [phone]          GREEN    ZONE   Good Control    What it looks like:     Things are going generally well. You have normal up s and down s. You may even feel depressed from time to time, but bad moods usually last less than a day.   What you need to do:  1. Continue to care for yourself (see self care plan)  2. Check your depression survival kit and update it as needed  3. Follow your physician s recommendations including any medication.  4. Do not stop taking medication unless you consult with your physician first.           YELLOW         ZONE Getting Worse    What it looks like:     Depression is starting to interfere with your life.     It may be hard to get out of bed; you may be starting to isolate yourself from others.    Symptoms of depression are starting to last most all day and this has happened for several days.     You may have suicidal thoughts but they are not constant.   What you need to do:     1. Call your care team, your response to treatment will improve if you keep your care team informed of your progress. Yellow periods are signs an adjustment may need to be made.     2. Continue your self-care, even if you have to fake it!    3. Talk to someone in your support network    4. Open up your depression survival kit           RED    ZONE Medical Alert - Get Help    What it looks like:     Depression is seriously interfering with your life.     You may experience these or other symptoms: You can t get out of bed most days, can t work or engage in other necessary activities, you have trouble taking care of basic hygiene, or basic responsibilities, thoughts of suicide or death that will not go away, self-injurious behavior.     What you need to do:  1. Call  your care team and request a same-day appointment. If they are not available (weekends or after hours) call your local crisis line, emergency room or 911.            Depression Self Care Plan / Survival Kit    Self-Care for Depression  Here s the deal. Your body and mind are really not as separate as most people think.  What you do and think affects how you feel and how you feel influences what you do and think. This means if you do things that people who feel good do, it will help you feel better.  Sometimes this is all it takes.  There is also a place for medication and therapy depending on how severe your depression is, so be sure to consult with your medical provider and/ or Behavioral Health Consultant if your symptoms are worsening or not improving.     In order to better manage my stress, I will:    Exercise  Get some form of exercise, every day. This will help reduce pain and release endorphins, the  feel good  chemicals in your brain. This is almost as good as taking antidepressants!  This is not the same as joining a gym and then never going! (they count on that by the way ) It can be as simple as just going for a walk or doing some gardening, anything that will get you moving.      Hygiene   Maintain good hygiene (Get out of bed in the morning, Make your bed, Brush your teeth, Take a shower, and Get dressed like you were going to work, even if you are unemployed).  If your clothes don't fit try to get ones that do.    Diet  I will strive to eat foods that are good for me, drink plenty of water, and avoid excessive sugar, caffeine, alcohol, and other mood-altering substances.  Some foods that are helpful in depression are: complex carbohydrates, B vitamins, flaxseed, fish or fish oil, fresh fruits and vegetables.    Psychotherapy  I agree to participate in Individual Therapy (if recommended).    Medication  If prescribed medications, I agree to take them.  Missing doses can result in serious side effects.   I understand that drinking alcohol, or other illicit drug use, may cause potential side effects.  I will not stop my medication abruptly without first discussing it with my provider.    Staying Connected With Others  I will stay in touch with my friends, family members, and my primary care provider/team.    Use your imagination  Be creative.  We all have a creative side; it doesn t matter if it s oil painting, sand castles, or mud pies! This will also kick up the endorphins.    Witness Beauty  (AKA stop and smell the roses) Take a look outside, even in mid-winter. Notice colors, textures. Watch the squirrels and birds.     Service to others  Be of service to others.  There is always someone else in need.  By helping others we can  get out of ourselves  and remember the really important things.  This also provides opportunities for practicing all the other parts of the program.    Humor  Laugh and be silly!  Adjust your TV habits for less news and crime-drama and more comedy.    Control your stress  Try breathing deep, massage therapy, biofeedback, and meditation. Find time to relax each day.     My support system    Clinic Contact:  Phone number:    Contact 1:  Phone number:    Contact 2:  Phone number:    [employer]/:  Phone number:    Therapist:  Phone number:    [facility] crisis center:    Phone number:    Other community support:  Phone number:

## 2018-07-18 NOTE — TELEPHONE ENCOUNTER
Patient has an OV scheduled for this afternoon for med check.    Mary Carmen HE, RN, BSN, PHN  [place] [facility] RN

## 2018-07-18 NOTE — PROGRESS NOTES
SUBJECTIVE:   Diogenes Cody is a 28 year old male who presents to clinic today for the following health issues:      Depression Followup    Status since last visit: Stable  Since last OV- 2/26/18    See PHQ-9 for current symptoms.  Other associated symptoms: None    Complicating factors:   Significant life event:  No   Current substance abuse:  None  Anxiety or Panic symptoms:  No    PHQ-9 1/27/2018 2/26/2018 5/16/2018   Total Score 10 1 2   Q9: Suicide Ideation Not at all Not at all Not at all     In the past two weeks have you had thoughts of suicide or self-harm?  No.    Do you have concerns about your personal safety or the safety of others?   No  PHQ-9  English  PHQ-9   Any Language  Suicide Assessment Five-step Evaluation and Treatment (SAFE-T)    Amount of exercise or physical activity: 2-3 days/week for an average of greater than 60 minutes    Problems taking medications regularly: No    Medication side effects: none    Diet: regular (no restrictions)    Has been on prozac for the last several years.  Reports it is going really well.  Feels much better on the medication and wants to continue taking it.  Denies substance abuse.  He works in a factory.  Reports work is going really well.  He has been at his job for the past 2 years.          Problem list and histories reviewed & adjusted, as indicated.  Additional history: as documented    Current Outpatient Prescriptions   Medication Sig Dispense Refill     Cholecalciferol (VITAMIN D-3 PO)        fish oil-omega-3 fatty acids 1000 MG capsule        FLUoxetine (PROZAC) 40 MG capsule TAKE 1 CAPSULE (40 MG) BY MOUTH DAILY 90 capsule 1     MILK THISTLE PO        OMEPRAZOLE PO        Allergies   Allergen Reactions     Amoxicillin      Ceclor [Cefaclor]      Sulfa Drugs      Sulfasalazine Hives     Vantin [Cefpodoxime] Hives       Reviewed and updated as needed this visit by clinical staff  Tobacco  Allergies  Meds  Problems  Med Hx  Surg Hx  Fam Hx  Soc  Hx        Reviewed and updated as needed this visit by Provider         ROS:  Constitutional, HEENT, cardiovascular, pulmonary, gi and gu systems are negative, except as otherwise noted.    OBJECTIVE:     /86  Pulse 91  Temp 99.2  F (37.3  C) (Oral)  Resp 12  Wt 196 lb 9.6 oz (89.2 kg)  SpO2 98%  BMI 28.62 kg/m2  Body mass index is 28.62 kg/(m^2).  GENERAL: healthy, alert and no distress  RESP: lungs clear to auscultation - no rales, rhonchi or wheezes  CV: regular rate and rhythm, normal S1 S2, no S3 or S4, no murmur, click or rub  PSYCH: mentation appears normal, affect normal/bright    Diagnostic Test Results:  none     ASSESSMENT/PLAN:     1. Moderate episode of recurrent major depressive disorder (H)  Stable, well controlled.  Continue on medication.    - FLUoxetine (PROZAC) 40 MG capsule; TAKE 1 CAPSULE (40 MG) BY MOUTH DAILY  Dispense: 90 capsule; Refill: 1    2. Obesity without serious comorbidity, unspecified classification, unspecified obesity type  Has lost 12 pounds in the last 6 months.  He has cut soda out of his diet.  He did switch from soda to tea, but is now switching from tea to all water.  He has worked hard to make better dietary choices and looking at the packages of the food he eats.  He also started to be more active.       F/u 6 months     YEYO [facility]

## 2018-07-18 NOTE — TELEPHONE ENCOUNTER
"Requested Prescriptions   Pending Prescriptions Disp Refills     FLUoxetine (PROZAC) 40 MG capsule 90 capsule 1    Last Written Prescription Date:  2/26/18  Last Fill Quantity: 90,  # refills: 1   Last Office Visit: 5/16/2018   Future Office Visit:    Next 5 appointments (look out 90 days)     Jul 18, 2018  3:40 PM CDT   MyChart Long with YEYO Mendoza CNP   [facility]    [address] [place] [address], [address]  [facility] [address] [phone]                  Sig: TAKE 1 CAPSULE (40 MG) BY MOUTH DAILY    SSRIs Protocol Passed    7/18/2018 10:18 AM       Passed - PHQ-9 score less than 5 in past 6 months    PHQ-9 SCORE 1/27/2018 2/26/2018 5/16/2018   Total Score MyChart 10 (Moderate depression) - -   Total Score 10 1 2     RADHA-7 SCORE 1/27/2018   Total Score 7 (mild anxiety)   Total Score 7              Passed - Patient is age 18 or older       Passed - Recent (6 mo) or future (30 days) visit within the authorizing provider's specialty    Patient had office visit in the last 6 months or has a visit in the next 30 days with authorizing provider or within the authorizing provider's specialty.  See \"Patient Info\" tab in inbasket, or \"Choose Columns\" in Meds & Orders section of the refill encounter.              "

## 2018-07-18 NOTE — MR AVS SNAPSHOT
After Visit Summary   7/18/2018    Diogenes Cody    MRN: [id number]           Patient Information     Date Of Birth          1990        Visit Information        Provider Department      7/18/2018 3:40 PM Carol Corbin APRN CNP [facility]        Today's Diagnoses     Moderate episode of recurrent major depressive disorder (H)    -  1    Obesity without serious comorbidity, unspecified classification, unspecified obesity type           Follow-ups after your visit        Follow-up notes from your care team     Return in about 6 months (around 1/18/2019) for Mental Health Follow up.      Who to contact     If you have questions or need follow up information about today's clinic visit or your schedule please contact [facility] directly at [phone].  Normal or non-critical lab and imaging results will be communicated to you by Thrasoshart, letter or phone within 4 business days after the clinic has received the results. If you do not hear from us within 7 days, please contact the clinic through Thras[employer]art or phone. If you have a critical or abnormal lab result, we will notify you by phone as soon as possible.  Submit refill requests through [employer] or call your pharmacy and they will forward the refill request to us. Please allow 3 business days for your refill to be completed.          Additional Information About Your Visit        MyChart Information     [employer] gives you secure access to your electronic health record. If you see a primary care provider, you can also send messages to your care team and make appointments. If you have questions, please call your primary care clinic.  If you do not have a primary care provider, please call [phone] and they will assist you.        Care EveryWhere ID     This is your Care EveryWhere ID. This could be used by other organizations to access your [facility] medical records  [id number]        Your Vitals Were      Pulse Temperature Respirations Pulse Oximetry BMI (Body Mass Index)       91 99.2  F (37.3  C) (Oral) 12 98% 28.62 kg/m2        Blood Pressure from Last 3 Encounters:   07/18/18 106/86   05/16/18 110/84   02/26/18 126/84    Weight from Last 3 Encounters:   07/18/18 196 lb 9.6 oz (89.2 kg)   05/16/18 195 lb (88.5 kg)   02/26/18 208 lb 4.8 oz (94.5 kg)              We Performed the Following     DEPRESSION ACTION PLAN (DAP)          Where to get your medicines      These medications were sent to [place] [address] [place] [facility] [address] [facility]  [address] [facility] [address]    Hours:  Tech issues with their phone system Phone:  [phone]     FLUoxetine 40 MG capsule          Primary Care Provider Office Phone # Fax #    Kyung Edyta Ryan [phone]  [address]   [facility] [address]        Equal Access to Services     [facility] MARCY : Hadii aad ku hadasho Soomaali, waaxda luqadaha, qaybta kaalmada adeegyada, waxay joliein haybambi fritz . So [facility] [phone].    ATENCIÓN: Si habla español, tiene a santiago disposición servicios gratuitos de asistencia lingüística. Llame al [phone].    We comply with applicable federal civil rights laws and [place] laws. We do not discriminate on the basis of race, color, national origin, age, disability, sex, sexual orientation, or gender identity.            Thank you!     Thank you for choosing [facility]  for your care. Our goal is always to provide you with excellent care. Hearing back from our patients is one way we can continue to improve our services. Please take a few minutes to complete the written survey that you may receive in the mail after your visit with us. Thank you!             Your Updated Medication List - Protect others around you: Learn how to safely use, store and throw away your medicines at www.disposemymeds.org.          This list is accurate as of 7/18/18  4:21 PM.   Always use your most recent med list.                   Brand Name Dispense Instructions for use Diagnosis    fish oil-omega-3 fatty acids 1000 MG capsule           FLUoxetine 40 MG capsule    PROzac    90 capsule    TAKE 1 CAPSULE (40 MG) BY MOUTH DAILY    Moderate episode of recurrent major depressive disorder (H)       MILK THISTLE PO           OMEPRAZOLE PO           VITAMIN D-3 PO

## 2018-07-19 ASSESSMENT — PATIENT HEALTH QUESTIONNAIRE - PHQ9: SUM OF ALL RESPONSES TO PHQ QUESTIONS 1-9: 1

## 2018-07-19 ASSESSMENT — ANXIETY QUESTIONNAIRES: GAD7 TOTAL SCORE: 1

## 2018-07-25 NOTE — TELEPHONE ENCOUNTER
RX for 90 day supply with 1 refill was already sent by provider at office visit on 7/18/2018.  Amaya Duncan RN

## 2018-08-09 ENCOUNTER — TELEPHONE (OUTPATIENT)
Dept: FAMILY MEDICINE | Facility: CLINIC | Age: 28
End: 2018-08-09

## 2018-08-09 NOTE — TELEPHONE ENCOUNTER
Panel Management Review      Patient has the following on his problem list:     Depression / Dysthymia review    Measure:  Needs PHQ-9 score of 4 or less during index window.  Administer PHQ-9 and if score is 5 or more, send encounter to provider for next steps.    5 - 7 month window range: PHQ-9 due NOW    PHQ-9 SCORE 2/26/2018 5/16/2018 7/18/2018   Total Score MyChart - - -   Total Score 1 2 1       If PHQ-9 recheck is 5 or more, route to provider for next steps.    Patient is due for:  PHQ9      Composite cancer screening  Chart review shows that this patient is due/due soon for the following None  Summary:    Patient is due/failing the following:   FOLLOW UP and PHQ9    Action needed:   Patient needs office visit for depression.    Type of outreach:    NONE.  Patient had a follow up DEPRESSION office visit appointment on 07/18/18.     PHQ-9 score:    PHQ-9 SCORE 7/18/2018   Total Score MyChart -   Total Score 1     Questions for provider review:    None                                                                                                                                    Lisa Magill, CMA

## 2018-09-21 ENCOUNTER — OFFICE VISIT (OUTPATIENT)
Dept: FAMILY MEDICINE | Facility: CLINIC | Age: 28
End: 2018-09-21
Payer: COMMERCIAL

## 2018-09-21 VITALS
OXYGEN SATURATION: 99 % | SYSTOLIC BLOOD PRESSURE: 120 MMHG | HEART RATE: 66 BPM | BODY MASS INDEX: 28.38 KG/M2 | DIASTOLIC BLOOD PRESSURE: 70 MMHG | WEIGHT: 195 LBS | TEMPERATURE: 98.2 F | RESPIRATION RATE: 14 BRPM

## 2018-09-21 DIAGNOSIS — R03.0 ELEVATED BLOOD-PRESSURE READING WITHOUT DIAGNOSIS OF HYPERTENSION: ICD-10-CM

## 2018-09-21 DIAGNOSIS — F12.10 CANNABIS ABUSE: ICD-10-CM

## 2018-09-21 DIAGNOSIS — F33.1 MODERATE EPISODE OF RECURRENT MAJOR DEPRESSIVE DISORDER (H): Primary | ICD-10-CM

## 2018-09-21 DIAGNOSIS — R74.8 ELEVATED LIVER ENZYMES: ICD-10-CM

## 2018-09-21 LAB
ALBUMIN SERPL-MCNC: 4.2 G/DL (ref 3.4–5)
ALP SERPL-CCNC: 83 U/L (ref 40–150)
ALT SERPL W P-5'-P-CCNC: 33 U/L (ref 0–70)
AST SERPL W P-5'-P-CCNC: 21 U/L (ref 0–45)
BILIRUB DIRECT SERPL-MCNC: 0.2 MG/DL (ref 0–0.2)
BILIRUB SERPL-MCNC: 0.8 MG/DL (ref 0.2–1.3)
PROT SERPL-MCNC: 7.5 G/DL (ref 6.8–8.8)

## 2018-09-21 PROCEDURE — 99214 OFFICE O/P EST MOD 30 MIN: CPT | Performed by: NURSE PRACTITIONER

## 2018-09-21 PROCEDURE — 36415 COLL VENOUS BLD VENIPUNCTURE: CPT | Performed by: FAMILY MEDICINE

## 2018-09-21 PROCEDURE — 80076 HEPATIC FUNCTION PANEL: CPT | Performed by: FAMILY MEDICINE

## 2018-09-21 ASSESSMENT — PATIENT HEALTH QUESTIONNAIRE - PHQ9
SUM OF ALL RESPONSES TO PHQ QUESTIONS 1-9: 10
SUM OF ALL RESPONSES TO PHQ QUESTIONS 1-9: 10
10. IF YOU CHECKED OFF ANY PROBLEMS, HOW DIFFICULT HAVE THESE PROBLEMS MADE IT FOR YOU TO DO YOUR WORK, TAKE CARE OF THINGS AT HOME, OR GET ALONG WITH OTHER PEOPLE: VERY DIFFICULT

## 2018-09-21 NOTE — MR AVS SNAPSHOT
After Visit Summary   9/21/2018    Diogenes Cody    MRN: [id number]           Patient Information     Date Of Birth          1990        Visit Information        Provider Department      9/21/2018 4:00 PM Carol Corbin APRN CNP [facility]        Today's Diagnoses     Moderate episode of recurrent major depressive disorder (H)    -  1    Elevated blood-pressure reading without diagnosis of hypertension        Elevated liver enzymes           Follow-ups after your visit        Follow-up notes from your care team     Return in about 4 weeks (around 10/19/2018) for Mental Health Follow up.      Who to contact     If you have questions or need follow up information about today's clinic visit or your schedule please contact [facility] directly at [phone].  Normal or non-critical lab and imaging results will be communicated to you by [employer], letter or phone within 4 business days after the clinic has received the results. If you do not hear from us within 7 days, please contact the clinic through [employer] or phone. If you have a critical or abnormal lab result, we will notify you by phone as soon as possible.  Submit refill requests through Clinicbook or call your pharmacy and they will forward the refill request to us. Please allow 3 business days for your refill to be completed.          Additional Information About Your Visit        MyChart Information     Clinicbook gives you secure access to your electronic health record. If you see a primary care provider, you can also send messages to your care team and make appointments. If you have questions, please call your primary care clinic.  If you do not have a primary care provider, please call [phone] and they will assist you.        Care EveryWhere ID     This is your Care EveryWhere ID. This could be used by other organizations to access your [place] medical records  [id number]        Your Vitals Were      Pulse Temperature Respirations Pulse Oximetry BMI (Body Mass Index)       66 98.2  F (36.8  C) (Oral) 14 99% 28.38 kg/m2        Blood Pressure from Last 3 Encounters:   09/21/18 120/70   07/18/18 106/86   05/16/18 110/84    Weight from Last 3 Encounters:   09/21/18 195 lb (88.5 kg)   07/18/18 196 lb 9.6 oz (89.2 kg)   05/16/18 195 lb (88.5 kg)              We Performed the Following     **Hepatic panel FUTURE 2mo          Today's Medication Changes          These changes are accurate as of 9/21/18  4:22 PM.  If you have any questions, ask your nurse or doctor.               These medicines have changed or have updated prescriptions.        Dose/Directions    * FLUoxetine 40 MG capsule   Commonly known as:  PROzac   This may have changed:  Another medication with the same name was added. Make sure you understand how and when to take each.   Used for:  Moderate episode of recurrent major depressive disorder (H)   Changed by:  Carol Corbin APRN CNP        TAKE 1 CAPSULE (40 MG) BY MOUTH DAILY   Quantity:  90 capsule   Refills:  1       * FLUoxetine 20 MG capsule   Commonly known as:  PROzac   This may have changed:  You were already taking a medication with the same name, and this prescription was added. Make sure you understand how and when to take each.   Used for:  Moderate episode of recurrent major depressive disorder (H)   Changed by:  Carol Corbin APRN CNP        Dose:  60 mg   Take 3 capsules (60 mg) by mouth daily   Quantity:  90 capsule   Refills:  1       * Notice:  This list has 2 medication(s) that are the same as other medications prescribed for you. Read the directions carefully, and ask your doctor or other care provider to review them with you.         Where to get your medicines      These medications were sent to [facility] [id number] [place] [address] [facility] [address] [address] [facility], [facility] [address]     Phone:  [phone]      FLUoxetine 20 MG capsule                Primary Care Provider Office Phone # Fax #    Kyung Edyta Ryan [phone]       [address]  [address]   [facility] [address]        Equal Access to Services     CHASE VIDAL : Hadliu rashmi arrington paxtono Soporscheali, waaxda luqadaha, qaybta kaalmada adeegyada, brandon joliein hayaatyshawn cruzguillermo mendezjerry bolton. So [facility] [phone].    ATENCIÓN: Si habla español, tiene a santiago disposición servicios gratuitos de asistencia lingüística. Llame al [phone].    We comply with applicable federal civil rights laws and [place] laws. We do not discriminate on the basis of race, color, national origin, age, disability, sex, sexual orientation, or gender identity.            Thank you!     Thank you for choosing [facility]  for your care. Our goal is always to provide you with excellent care. Hearing back from our patients is one way we can continue to improve our services. Please take a few minutes to complete the written survey that you may receive in the mail after your visit with us. Thank you!             Your Updated Medication List - Protect others around you: Learn how to safely use, store and throw away your medicines at www.disposemymeds.org.          This list is accurate as of 9/21/18  4:22 PM.  Always use your most recent med list.                   Brand Name Dispense Instructions for use Diagnosis    fish oil-omega-3 fatty acids 1000 MG capsule           * FLUoxetine 40 MG capsule    PROzac    90 capsule    TAKE 1 CAPSULE (40 MG) BY MOUTH DAILY    Moderate episode of recurrent major depressive disorder (H)       * FLUoxetine 20 MG capsule    PROzac    90 capsule    Take 3 capsules (60 mg) by mouth daily    Moderate episode of recurrent major depressive disorder (H)       MILK THISTLE PO      Take 525 mg by mouth        OMEPRAZOLE PO           VITAMIN D-3 PO           * Notice:  This list has 2 medication(s) that are the same as other medications  prescribed for you. Read the directions carefully, and ask your doctor or other care provider to review them with you.

## 2018-09-21 NOTE — PROGRESS NOTES
"  SUBJECTIVE:   Diogenes Cody is a 28 year old male who presents to clinic today for the following health issues:      History of Present Illness     Depression & Anxiety Follow-up:     Depression/Anxiety:  Depression & Anxiety    Status since last visit::  Worsened    Other associated symptoms of depression and anxiety::  None    Significant life event::  No    Current substance use::  Cannabis       Today's PHQ-9         PHQ-9 Total Score:     (P) 10   PHQ-9 Q9 Suicidal ideation:   (P) Not at all   Thoughts of suicide or self harm:      Self-harm Plan:        Self-harm Action:          Safety concerns for self or others:            Hypertension:     Outpatient blood pressures:  Are not being checked    Dietary sodium intake::  Not monitoring salt intake    Diet:  Low fat/cholesterol  Frequency of exercise:  1 day/week  Duration of exercise:  30-45 minutes  Taking medications regularly:  Yes  Medication side effects:  Not applicable  Additional concerns today:  No    PHQ-9 SCORE 5/16/2018 7/18/2018 9/21/2018   Total Score MyChart - - 10 (Moderate depression)   Total Score 2 1 10     BP Readings from Last 6 Encounters:   09/21/18 120/70   07/18/18 106/86   05/16/18 110/84   02/26/18 126/84   01/27/18 126/86   11/08/14 126/85   Not sure why, but depression seems worse over the last month or so.  Feeling blah, little motivation.  Wondering if he can increase his dose.  He has been \"self medicating\" with daily marijuana use to see if this helps.  Hasn't been making a difference.  Has used marijuana off/on for some time.  He reports it can be hard to fall asleep and sometimes wakes early and has a hard time falling back to sleep.  Not feeling terribly anxious.  Work is going well.  No thoughts of self harm/SI.  In the past liver enzymes were elevated.  He had an US that showed a fatty liver.  Was to have liver tests rechecked but never did.  Has been thinking about this a lot lately.  When labs were last checked (Feb " 2018) he had been drinking alcohol daily.  Has cut his out almost completely.  Will have a rare drink if friends are in town.  Has also tried to clean up his diet.        Problem list and histories reviewed & adjusted, as indicated.  Additional history: as documented        Current Outpatient Prescriptions   Medication Sig Dispense Refill     Cholecalciferol (VITAMIN D-3 PO)        fish oil-omega-3 fatty acids 1000 MG capsule        FLUoxetine (PROZAC) 40 MG capsule TAKE 1 CAPSULE (40 MG) BY MOUTH DAILY 90 capsule 1     MILK THISTLE PO Take 525 mg by mouth        OMEPRAZOLE PO        Allergies   Allergen Reactions     Amoxicillin      Ceclor [Cefaclor]      Sulfa Drugs      Sulfasalazine Hives     Vantin [Cefpodoxime] Hives       ROS:  Constitutional, HEENT, cardiovascular, pulmonary, gi and gu systems are negative, except as otherwise noted.    OBJECTIVE:     /70  Pulse 66  Temp 98.2  F (36.8  C) (Oral)  Resp 14  Wt 195 lb (88.5 kg)  SpO2 99%  BMI 28.38 kg/m2  Body mass index is 28.38 kg/(m^2).  GENERAL: healthy, alert and no distress  RESP: lungs clear to auscultation - no rales, rhonchi or wheezes  CV: regular rate and rhythm, normal S1 S2, no S3 or S4, no murmur, click or rub  MS: no gross musculoskeletal defects noted, no edema  PSYCH: mentation appears normal, affect normal/bright    Diagnostic Test Results:  none     ASSESSMENT/PLAN:   1. Moderate episode of recurrent major depressive disorder (H)  Worsened; will increase dose.  Prozac had been working really well for him. I do recommend he quit using marijuana daily.  May be negatively impacting mood.  Will follow up in 4 weeks.   - FLUoxetine (PROZAC) 20 MG capsule; Take 3 capsules (60 mg) by mouth daily  Dispense: 90 capsule; Refill: 1    2. Elevated blood-pressure reading without diagnosis of hypertension  Well controlled today.  Will continue to monitor.     3. Elevated liver enzymes  Elevated liver enzymes noted on labs 6 months ago.  US  showing fatty liver.  Has cleaned up diet and cut out daily alcohol use; suspect labs will be improved due to this.  Will recheck hepatic panel today.  If continues to be elevated will check hepatitis labs.   - **Hepatic panel FUTURE 2mo    4. Cannabis abuse  Advised to discontinue use.     F/u 4-6 weeks     YEYO [facility]  Answers for HPI/ROS submitted by the patient on 9/21/2018   PHQ-2 Score: 4  If you checked off any problems, how difficult have these problems made it for you to do your work, take care of things at home, or get along with other people?: Very difficult  PHQ9 TOTAL SCORE: 10

## 2018-09-22 ASSESSMENT — PATIENT HEALTH QUESTIONNAIRE - PHQ9: SUM OF ALL RESPONSES TO PHQ QUESTIONS 1-9: 10

## 2018-11-09 ENCOUNTER — OFFICE VISIT (OUTPATIENT)
Dept: URGENT CARE | Facility: URGENT CARE | Age: 28
End: 2018-11-09
Payer: COMMERCIAL

## 2018-11-09 VITALS
HEART RATE: 87 BPM | WEIGHT: 195 LBS | BODY MASS INDEX: 28.38 KG/M2 | TEMPERATURE: 98 F | DIASTOLIC BLOOD PRESSURE: 78 MMHG | SYSTOLIC BLOOD PRESSURE: 114 MMHG | OXYGEN SATURATION: 96 %

## 2018-11-09 DIAGNOSIS — H66.90 ACUTE OTITIS MEDIA, UNSPECIFIED OTITIS MEDIA TYPE: Primary | ICD-10-CM

## 2018-11-09 PROCEDURE — 99213 OFFICE O/P EST LOW 20 MIN: CPT | Performed by: PHYSICIAN ASSISTANT

## 2018-11-09 RX ORDER — AZITHROMYCIN 250 MG/1
TABLET, FILM COATED ORAL
Qty: 6 TABLET | Refills: 0 | Status: SHIPPED | OUTPATIENT
Start: 2018-11-09 | End: 2018-11-14

## 2018-11-09 ASSESSMENT — ENCOUNTER SYMPTOMS
VOMITING: 0
FEVER: 0
COUGH: 0
CHILLS: 0
NAUSEA: 0
DIARRHEA: 0

## 2018-11-10 NOTE — PROGRESS NOTES
SUBJECTIVE:   Diogenes Cody is a 28 year old male presenting with a chief complaint of   Chief Complaint   Patient presents with     Urgent Care     Otalgia     Bilateral ear pain started yesterday, worse today, history of chronic ear infections        He is an established patient of [facility].    Otalgia    Onset of symptoms was today.  Course of illness is worsening.    Severity moderately severe  Current and Associated symptoms: ear pain bilateral  Treatment measures tried include Tylenol/Ibuprofen.  Predisposing factors include HX of recurrent OM.  He denies any fevers or chills.      Review of Systems   Constitutional: Negative for chills and fever.   HENT: Positive for ear pain.    Respiratory: Negative for cough.    Gastrointestinal: Negative for diarrhea, nausea and vomiting.       Past Medical History:   Diagnosis Date     Concussion age 14     GERD (gastroesophageal reflux disease)      H/O meningitis      Varicella      Family History   Problem Relation Age of Onset     Alcoholism Father      Other - See Comments Paternal Grandmother      gun shot     Current Outpatient Prescriptions   Medication Sig Dispense Refill     azithromycin (ZITHROMAX Z-LASHAWN) 250 MG tablet Take 2 tablets today then 1 daily times 4 days 6 tablet 0     Cholecalciferol (VITAMIN D-3 PO)        fish oil-omega-3 fatty acids 1000 MG capsule        FLUoxetine (PROZAC) 20 MG capsule Take 3 capsules (60 mg) by mouth daily 90 capsule 1     FLUoxetine (PROZAC) 40 MG capsule TAKE 1 CAPSULE (40 MG) BY MOUTH DAILY 90 capsule 1     MILK THISTLE PO Take 525 mg by mouth        OMEPRAZOLE PO        Social History   Substance Use Topics     Smoking status: Former Smoker     Packs/day: 1.00     Types: Other, Cigarettes     Quit date: 1/18/2018     Smokeless tobacco: Current User     Types: Chew     Alcohol use 1.2 oz/week     2 Shots of liquor per week      Comment: 2 per day       OBJECTIVE  /78  Pulse 87  Temp 98  F (36.7  C) (Oral)  Wt 195  lb (88.5 kg)  SpO2 96%  BMI 28.38 kg/m2    Physical Exam   Constitutional: He appears well-developed and well-nourished. No distress.   HENT:   Head: Normocephalic and atraumatic.   Right Ear: External ear normal. Tympanic membrane is erythematous.   Left Ear: External ear normal. Tympanic membrane is erythematous and bulging.   Mouth/Throat: Oropharynx is clear and moist.   Eyes: Conjunctivae are normal.   Neck: Normal range of motion.   Pulmonary/Chest: Effort normal and breath sounds normal. No respiratory distress.   Neurological: He is alert.   Skin: Skin is warm and dry.   Psychiatric: He has a normal mood and affect.   Nursing note and vitals reviewed.      Labs:  No results found for this or any previous visit (from the past 24 hour(s)).        ASSESSMENT:      ICD-10-CM    1. Acute otitis media, unspecified otitis media type H66.90 azithromycin (ZITHROMAX Z-LASHAWN) 250 MG tablet        Medical Decision Making:    Differential Diagnosis:  Acute right otitis media, Acute left otitis media, Allergic rhinitis, Viral syndrome and Viral upper respiratory illness,Eustachian tube dysfunction    Serious Comorbid Conditions:  Adult:  None    PLAN:    Bilateral otitis media: Zithromax is prescribed.  Tylenol or Motrin as needed for pain.  Follow-up if any worsening symptoms.  Patient agrees with the plan.    Followup:    If not improving or if condition worsens, follow up with your Primary Care Provider

## 2018-11-13 ENCOUNTER — TELEPHONE (OUTPATIENT)
Dept: FAMILY MEDICINE | Facility: CLINIC | Age: 28
End: 2018-11-13

## 2018-11-13 NOTE — TELEPHONE ENCOUNTER
Panel Management Review      Patient has the following on his problem list:     Depression / Dysthymia review    Measure:  Needs PHQ-9 score of 4 or less during index window.  Administer PHQ-9 and if score is 5 or more, send encounter to provider for next steps.    5 - 7 month window range: PHQ-9 due NOW    PHQ-9 SCORE 5/16/2018 7/18/2018 9/21/2018   Total Score MyChart - - 10 (Moderate depression)   Total Score 2 1 10       If PHQ-9 recheck is 5 or more, route to provider for next steps.    Patient is due for:  PHQ9      Composite cancer screening  Chart review shows that this patient is due/due soon for the following None  Summary:    Patient is due/failing the following:   FOLLOW UP and PHQ9    Action needed:   Patient needs a follow up office visit for Mental Health.    Type of outreach:    Sent [employer] message.    Questions for provider review:    None                                                                                                                                    Lisa Magill, [facility]       Chart routed to Care Team .

## 2018-12-05 ENCOUNTER — OFFICE VISIT (OUTPATIENT)
Dept: FAMILY MEDICINE | Facility: CLINIC | Age: 28
End: 2018-12-05
Payer: COMMERCIAL

## 2018-12-05 VITALS
SYSTOLIC BLOOD PRESSURE: 112 MMHG | DIASTOLIC BLOOD PRESSURE: 64 MMHG | HEART RATE: 72 BPM | HEIGHT: 69 IN | TEMPERATURE: 98.3 F | BODY MASS INDEX: 29.12 KG/M2 | OXYGEN SATURATION: 98 % | WEIGHT: 196.6 LBS | RESPIRATION RATE: 16 BRPM

## 2018-12-05 DIAGNOSIS — F33.1 MODERATE EPISODE OF RECURRENT MAJOR DEPRESSIVE DISORDER (H): ICD-10-CM

## 2018-12-05 PROCEDURE — 99213 OFFICE O/P EST LOW 20 MIN: CPT | Performed by: NURSE PRACTITIONER

## 2018-12-05 ASSESSMENT — ANXIETY QUESTIONNAIRES
1. FEELING NERVOUS, ANXIOUS, OR ON EDGE: SEVERAL DAYS
IF YOU CHECKED OFF ANY PROBLEMS ON THIS QUESTIONNAIRE, HOW DIFFICULT HAVE THESE PROBLEMS MADE IT FOR YOU TO DO YOUR WORK, TAKE CARE OF THINGS AT HOME, OR GET ALONG WITH OTHER PEOPLE: SOMEWHAT DIFFICULT
3. WORRYING TOO MUCH ABOUT DIFFERENT THINGS: SEVERAL DAYS
6. BECOMING EASILY ANNOYED OR IRRITABLE: MORE THAN HALF THE DAYS
7. FEELING AFRAID AS IF SOMETHING AWFUL MIGHT HAPPEN: NOT AT ALL
GAD7 TOTAL SCORE: 6
5. BEING SO RESTLESS THAT IT IS HARD TO SIT STILL: NOT AT ALL
2. NOT BEING ABLE TO STOP OR CONTROL WORRYING: SEVERAL DAYS

## 2018-12-05 ASSESSMENT — PATIENT HEALTH QUESTIONNAIRE - PHQ9
SUM OF ALL RESPONSES TO PHQ QUESTIONS 1-9: 5
5. POOR APPETITE OR OVEREATING: SEVERAL DAYS

## 2018-12-05 NOTE — MR AVS SNAPSHOT
After Visit Summary   12/5/2018    Diogenes Cody    MRN: [id number]           Patient Information     Date Of Birth          1990        Visit Information        Provider Department      12/5/2018 3:00 PM Carol Corbin APRN CNP [facility]        Today's Diagnoses     Moderate episode of recurrent major depressive disorder (H)           Follow-ups after your visit        Follow-up notes from your care team     Return in about 6 months (around 6/5/2019) for Mental Health Follow up.      Your next 10 appointments already scheduled     Dec 12, 2018  4:00 PM CST   [facility]   [facility]    [address] [facility] [address]  [address]  [facility] [address] [phone]              Who to contact     If you have questions or need follow up information about today's clinic visit or your schedule please contact [facility] directly at [phone].  Normal or non-critical lab and imaging results will be communicated to you by [employer], letter or phone within 4 business days after the clinic has received the results. If you do not hear from us within 7 days, please contact the clinic through [employer]out[employer] or phone. If you have a critical or abnormal lab result, we will notify you by phone as soon as possible.  Submit refill requests through [employer] or call your pharmacy and they will forward the refill request to us. Please allow 3 business days for your refill to be completed.          Additional Information About Your Visit        [employer] Information     [employer] gives you secure access to your electronic health record. If you see a primary care provider, you can also send messages to your care team and make appointments. If you have questions, please call your primary care clinic.  If you do not have a primary care provider, please call [phone] and they will assist you.        Care  "EveryWhere ID     This is your Care EveryWhere ID. This could be used by other organizations to access your [place] medical records  [id number]        Your Vitals Were     Pulse Temperature Respirations Height Pulse Oximetry BMI (Body Mass Index)    72 98.3  F (36.8  C) (Oral) 16 5' 9\" (1.753 m) 98% 29.03 kg/m2       Blood Pressure from Last 3 Encounters:   12/05/18 112/64   11/09/18 114/78   09/21/18 120/70    Weight from Last 3 Encounters:   12/05/18 196 lb 9.6 oz (89.2 kg)   11/09/18 195 lb (88.5 kg)   09/21/18 195 lb (88.5 kg)              Today, you had the following     No orders found for display         Today's Medication Changes          These changes are accurate as of 12/5/18  3:34 PM.  If you have any questions, ask your nurse or doctor.               These medicines have changed or have updated prescriptions.        Dose/Directions    FLUoxetine 20 MG capsule   Commonly known as:  PROzac   This may have changed:  Another medication with the same name was removed. Continue taking this medication, and follow the directions you see here.   Used for:  Moderate episode of recurrent major depressive disorder (H)   Changed by:  Carol Corbin APRN CNP        Dose:  60 mg   Take 3 capsules (60 mg) by mouth daily   Quantity:  90 capsule   Refills:  1            Where to get your medicines      These medications were sent to [facility] [address] [place] [address] [id number] [place] [address] [address]  [address] [facility] [address]     Phone:  [phone]     FLUoxetine 20 MG capsule                Primary Care Provider Office Phone # Fax #    YEYO Mendoza [phone]       [facility] [address]  [address] [facility] [address]        Equal Access to Services     CHASE VIDAL AH: Marbin caraballo ku hadasho Soomaali, waaxda luqadaha, qaybta kaalmada adeegyada, waxay corinne bolton. So [facility] [phone].    ATENCIÓN: Si " soha soto, tiene a santiago disposición servicios gratuitos de asistencia lingüística. Yvrose mike [phone].    We comply with applicable federal civil rights laws and [place] laws. We do not discriminate on the basis of race, color, national origin, age, disability, sex, sexual orientation, or gender identity.            Thank you!     Thank you for choosing [facility]  for your care. Our goal is always to provide you with excellent care. Hearing back from our patients is one way we can continue to improve our services. Please take a few minutes to complete the written survey that you may receive in the mail after your visit with us. Thank you!             Your Updated Medication List - Protect others around you: Learn how to safely use, store and throw away your medicines at www.disposemymeds.org.          This list is accurate as of 12/5/18  3:34 PM.  Always use your most recent med list.                   Brand Name Dispense Instructions for use Diagnosis    fish oil-omega-3 fatty acids 1000 MG capsule           FLUoxetine 20 MG capsule    PROzac    90 capsule    Take 3 capsules (60 mg) by mouth daily    Moderate episode of recurrent major depressive disorder (H)       MILK THISTLE PO      Take 525 mg by mouth        OMEPRAZOLE PO           VITAMIN D-3 PO

## 2018-12-05 NOTE — PROGRESS NOTES
SUBJECTIVE:   Diogenes Cody is a 28 year old male who presents to clinic today for the following health issues:    Depression Followup    Status since last visit: Improving Slightly-Work has been stressful    See PHQ-9 for current symptoms.  Other associated symptoms: None    Complicating factors:   Significant life event:  No   Current substance abuse:  None  Anxiety or Panic symptoms:  No    PHQ 7/18/2018 9/21/2018 12/5/2018   PHQ-9 Total Score 1 10 5   Q9: Suicide Ideation Not at all Not at all Not at all     RADHA-7 SCORE 1/27/2018 7/18/2018 12/5/2018   Total Score 7 (mild anxiety) - -   Total Score 7 1 6         In the past two weeks have you had thoughts of suicide or self-harm?  No.    Do you have concerns about your personal safety or the safety of others?   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     Feeling much improved on increased dose of prozac.  Has cut way back on marijuana use; now only using occasionally.  No sleep concerns or SI.         Problem list and histories reviewed & adjusted, as indicated.  Additional history: as documented    Current Outpatient Medications   Medication Sig Dispense Refill     Cholecalciferol (VITAMIN D-3 PO)        fish oil-omega-3 fatty acids 1000 MG capsule        FLUoxetine (PROZAC) 20 MG capsule Take 3 capsules (60 mg) by mouth daily 90 capsule 1     MILK THISTLE PO Take 525 mg by mouth        OMEPRAZOLE PO        Allergies   Allergen Reactions     Amoxicillin      Ceclor [Cefaclor]      Sulfa Drugs      Sulfasalazine Hives     Vantin [Cefpodoxime] Hives       Reviewed and updated as needed this visit by clinical staff  Tobacco  Allergies   "Meds  Med Hx  Surg Hx  Fam Hx  Soc Hx      Reviewed and updated as needed this visit by Provider         ROS:  Constitutional, HEENT, cardiovascular, pulmonary, gi and gu systems are negative, except as otherwise noted.    OBJECTIVE:     /64 (BP Location: Right arm, Patient Position: Chair, Cuff Size: Adult Regular)   Pulse 72   Temp 98.3  F (36.8  C) (Oral)   Resp 16   Ht 1.753 m (5' 9\")   Wt 89.2 kg (196 lb 9.6 oz)   SpO2 98%   BMI 29.03 kg/m    Body mass index is 29.03 kg/m .  GENERAL: healthy, alert and no distress  RESP: lungs clear to auscultation - no rales, rhonchi or wheezes  CV: regular rate and rhythm, normal S1 S2, no S3 or S4, no murmur, click or rub  PSYCH: mentation appears normal, affect normal/bright    Diagnostic Test Results:  none     ASSESSMENT/PLAN:   1. Moderate episode of recurrent major depressive disorder (H)  Much improved; will continue at same dose.    - FLUoxetine (PROZAC) 20 MG capsule; Take 3 capsules (60 mg) by mouth daily  Dispense: 90 capsule; Refill: 1    F/u 6 mos    YEYO [facility]    "

## 2018-12-07 ASSESSMENT — ANXIETY QUESTIONNAIRES: GAD7 TOTAL SCORE: 6

## 2018-12-12 ENCOUNTER — APPOINTMENT (OUTPATIENT)
Dept: OPTOMETRY | Facility: CLINIC | Age: 28
End: 2018-12-12
Payer: COMMERCIAL

## 2018-12-12 ENCOUNTER — OFFICE VISIT (OUTPATIENT)
Dept: OPTOMETRY | Facility: CLINIC | Age: 28
End: 2018-12-12
Payer: COMMERCIAL

## 2018-12-12 DIAGNOSIS — H52.203 MYOPIA OF BOTH EYES WITH ASTIGMATISM: Primary | ICD-10-CM

## 2018-12-12 DIAGNOSIS — H52.13 MYOPIA OF BOTH EYES WITH ASTIGMATISM: Primary | ICD-10-CM

## 2018-12-12 PROCEDURE — V2020 VISION SVCS FRAMES PURCHASES: HCPCS | Performed by: OPTOMETRIST

## 2018-12-12 PROCEDURE — 92004 COMPRE OPH EXAM NEW PT 1/>: CPT | Performed by: OPTOMETRIST

## 2018-12-12 PROCEDURE — 92015 DETERMINE REFRACTIVE STATE: CPT | Performed by: OPTOMETRIST

## 2018-12-12 PROCEDURE — V2100 LENS SPHER SINGLE PLANO 4.00: HCPCS | Mod: RT | Performed by: OPTOMETRIST

## 2018-12-12 ASSESSMENT — CUP TO DISC RATIO
OS_RATIO: 0.25
OD_RATIO: 0.25

## 2018-12-12 ASSESSMENT — REFRACTION_MANIFEST
OS_CYLINDER: +1.00
OD_AXIS: 156
OS_SPHERE: -4.25
OS_AXIS: 041
OD_SPHERE: -4.50
OD_SPHERE: -3.50
OS_CYLINDER: +1.00
OS_AXIS: 055
OD_AXIS: 164
METHOD_AUTOREFRACTION: 1
OD_CYLINDER: +0.75
OD_CYLINDER: +1.25
OS_SPHERE: -4.75

## 2018-12-12 ASSESSMENT — REFRACTION_WEARINGRX
SPECS_TYPE: SVL
OD_SPHERE: -4.25
OS_CYLINDER: +1.25
OD_AXIS: 154
OS_AXIS: 56
OD_CYLINDER: +0.75
OS_SPHERE: -5.25

## 2018-12-12 ASSESSMENT — VISUAL ACUITY
METHOD: SNELLEN - LINEAR
OS_SC: 20/300
OS_CC: 20/20
CORRECTION_TYPE: GLASSES
OD_SC: 20/200
OD_CC: 20/20
OD_CC: 20/20
OS_CC+: -1
OS_CC: 20/20
OD_CC+: -1

## 2018-12-12 ASSESSMENT — CONF VISUAL FIELD
OD_NORMAL: 1
OS_NORMAL: 1
METHOD: COUNTING FINGERS

## 2018-12-12 ASSESSMENT — TONOMETRY
OD_IOP_MMHG: 16
OS_IOP_MMHG: 16
IOP_METHOD: APPLANATION

## 2018-12-12 ASSESSMENT — EXTERNAL EXAM - LEFT EYE: OS_EXAM: NORMAL

## 2018-12-12 ASSESSMENT — EXTERNAL EXAM - RIGHT EYE: OD_EXAM: NORMAL

## 2018-12-12 NOTE — PROGRESS NOTES
Chief Complaint   Patient presents with     Annual Eye Exam    2-3 Years since last exam at \[facility]\""  Glasses are 15 y old  Last Eye Exam: 3653-8310  Dilated Previously: Yes    What are you currently using to see?  glasses       Distance Vision Acuity: Noticed gradual change in both eyes    Near Vision Acuity: Satisfied with vision while reading  with glasses    Eye Comfort: good  Do you use eye drops? : Yes: Visine once daily for foreign body sensation (dust)  Occupation or Hobbies:     Kim Fabian, Optometric Assistant          Medical, surgical and family histories reviewed and updated 12/12/2018.       OBJECTIVE: See Ophthalmology exam    ASSESSMENT:    ICD-10-CM    1. Myopia of both eyes with astigmatism H52.13     H52.203       PLAN:   Discontinue visine , use artificial tears    Update prescription     Lily Ng OD

## 2018-12-12 NOTE — PATIENT INSTRUCTIONS
Updated prescription for new glasses, mild change     Recommend frequent face / brow wash and artificial tears  , not visine

## 2018-12-12 NOTE — LETTER
12/12/2018         RE: Diogenes Cody  [address] [facility] [address]        Dear Colleague,    Thank you for referring your patient, Diogenes Cody[facility]. Please see a copy of my visit note below.    Chief Complaint   Patient presents with     Annual Eye Exam    2-3 Years since last exam at [facility]  Glasses are 13 y old  Last Eye Exam: [id number]  Dilated Previously: Yes    What are you currently using to see?  glasses       Distance Vision Acuity: Noticed gradual change in both eyes    Near Vision Acuity: Satisfied with vision while reading  with glasses    Eye Comfort: good  Do you use eye drops? : Yes: Visine once daily for foreign body sensation (dust)  Occupation or Hobbies:     Kim Fabian, Optometric Assistant          Medical, surgical and family histories reviewed and updated 12/12/2018.       OBJECTIVE: See Ophthalmology exam    ASSESSMENT:    ICD-10-CM    1. Myopia of both eyes with astigmatism H52.13     H52.203       PLAN:     Update prescription   Lily Ng OD     Again, thank you for allowing me to participate in the care of your patient.        Sincerely,        Lily Ng, OD

## 2019-01-16 ENCOUNTER — OFFICE VISIT (OUTPATIENT)
Dept: FAMILY MEDICINE | Facility: CLINIC | Age: 29
End: 2019-01-16
Payer: COMMERCIAL

## 2019-01-16 VITALS
HEART RATE: 73 BPM | HEIGHT: 69 IN | BODY MASS INDEX: 28.73 KG/M2 | SYSTOLIC BLOOD PRESSURE: 110 MMHG | WEIGHT: 194 LBS | TEMPERATURE: 98 F | DIASTOLIC BLOOD PRESSURE: 76 MMHG | OXYGEN SATURATION: 97 %

## 2019-01-16 DIAGNOSIS — F33.1 MODERATE EPISODE OF RECURRENT MAJOR DEPRESSIVE DISORDER (H): ICD-10-CM

## 2019-01-16 PROCEDURE — 99214 OFFICE O/P EST MOD 30 MIN: CPT | Performed by: NURSE PRACTITIONER

## 2019-01-16 RX ORDER — BUPROPION HYDROCHLORIDE 150 MG/1
150 TABLET ORAL EVERY MORNING
Qty: 30 TABLET | Refills: 3 | Status: SHIPPED | OUTPATIENT
Start: 2019-01-16 | End: 2019-04-24

## 2019-01-16 ASSESSMENT — ANXIETY QUESTIONNAIRES
IF YOU CHECKED OFF ANY PROBLEMS ON THIS QUESTIONNAIRE, HOW DIFFICULT HAVE THESE PROBLEMS MADE IT FOR YOU TO DO YOUR WORK, TAKE CARE OF THINGS AT HOME, OR GET ALONG WITH OTHER PEOPLE: VERY DIFFICULT
1. FEELING NERVOUS, ANXIOUS, OR ON EDGE: MORE THAN HALF THE DAYS
2. NOT BEING ABLE TO STOP OR CONTROL WORRYING: SEVERAL DAYS
3. WORRYING TOO MUCH ABOUT DIFFERENT THINGS: SEVERAL DAYS
5. BEING SO RESTLESS THAT IT IS HARD TO SIT STILL: NOT AT ALL
GAD7 TOTAL SCORE: 10
6. BECOMING EASILY ANNOYED OR IRRITABLE: MORE THAN HALF THE DAYS
7. FEELING AFRAID AS IF SOMETHING AWFUL MIGHT HAPPEN: MORE THAN HALF THE DAYS

## 2019-01-16 ASSESSMENT — PATIENT HEALTH QUESTIONNAIRE - PHQ9
SUM OF ALL RESPONSES TO PHQ QUESTIONS 1-9: 9
5. POOR APPETITE OR OVEREATING: MORE THAN HALF THE DAYS

## 2019-01-16 ASSESSMENT — MIFFLIN-ST. JEOR: SCORE: 1830.36

## 2019-01-16 NOTE — PROGRESS NOTES
"  SUBJECTIVE:   Diogenes Cody is a 28 year old male who presents to clinic today for the following health issues:      Medication Followup of  Prozac    Taking Medication as prescribed: yes    Side Effects:  None    Medication Helping Symptoms:  some     Work is \"getting to him\".  He reports mood has been down and has been feeling nervous and irritable lately.  Feels like he wants to quit his job.  Vague thoughts of self harm, but no plan.  No substance abuse.  He has been sleeping well despite worsening mood. Wants to up prozac.     RADHA-7 SCORE 7/18/2018 12/5/2018 1/16/2019   Total Score - - -   Total Score 1 6 10       PHQ-9 SCORE 9/21/2018 12/5/2018 1/16/2019   PHQ-9 Total Score MyChart 10 (Moderate depression) - -   PHQ-9 Total Score 10 5 9       Problem list and histories reviewed & adjusted, as indicated.  Additional history: as documented    Current Outpatient Medications   Medication Sig Dispense Refill     Cholecalciferol (VITAMIN D-3 PO)        fish oil-omega-3 fatty acids 1000 MG capsule        FLUoxetine (PROZAC) 20 MG capsule Take 3 capsules (60 mg) by mouth daily 90 capsule 1     MILK THISTLE PO Take 525 mg by mouth        OMEPRAZOLE PO        Allergies   Allergen Reactions     Amoxicillin      Ceclor [Cefaclor]      Sulfa Drugs      Sulfasalazine Hives     Vantin [Cefpodoxime] Hives       Reviewed and updated as needed this visit by clinical staff       Reviewed and updated as needed this visit by Provider         ROS:  Constitutional, HEENT, cardiovascular, pulmonary, gi and gu systems are negative, except as otherwise noted.    OBJECTIVE:     /76 (BP Location: Right arm, Patient Position: Sitting, Cuff Size: Adult Regular)   Pulse 73   Temp 98  F (36.7  C) (Oral)   Ht 1.753 m (5' 9\")   Wt 88 kg (194 lb)   SpO2 97%   BMI 28.65 kg/m    Body mass index is 28.65 kg/m .  GENERAL: healthy, alert and no distress  ABDOMEN: soft, nontender, no hepatosplenomegaly, no masses and bowel sounds " normal  PSYCH: mentation appears normal, affect flat    Diagnostic Test Results:  none     ASSESSMENT/PLAN:   1. Moderate episode of recurrent major depressive disorder (H)  Worsening; will add in wellbutrin.  Risks, benefits and side effects reviewed.    - FLUoxetine (PROZAC) 20 MG capsule; Take 3 capsules (60 mg) by mouth daily  Dispense: 90 capsule; Refill: 1  - buPROPion (WELLBUTRIN XL) 150 MG 24 hr tablet; Take 1 tablet (150 mg) by mouth every morning  Dispense: 30 tablet; Refill: 3    F/u 3-4 wks    YEYO Bishop [facility]

## 2019-01-17 ASSESSMENT — ANXIETY QUESTIONNAIRES: GAD7 TOTAL SCORE: 10

## 2019-03-23 DIAGNOSIS — F33.1 MODERATE EPISODE OF RECURRENT MAJOR DEPRESSIVE DISORDER (H): ICD-10-CM

## 2019-03-23 NOTE — LETTER
[address], [address]  [facility] [address]  Phone: [phone]  Fax: [phone]    04/01/19    Diogenes Cody  [address] [facility] [address]      Dear Diogenes:     We recently received a request to refill your medication - FLUoxetine (PROZAC) 20 MG capsule.    A review of your chart indicates that an appointment is required with your provider for office visit. Please call the clinic at [phone] to schedule your appointment.    We have authorized one refill of your medication to allow time for you to schedule your appointment.    Taking care of your health is important to us, and ongoing visits with your provider are vital to your care.  We look forward to seeing you in the near future.          Sincerely,      YEYO Bishop CNP/Chandrika GUERRA RN

## 2019-03-25 ENCOUNTER — OFFICE VISIT (OUTPATIENT)
Dept: URGENT CARE | Facility: URGENT CARE | Age: 29
End: 2019-03-25
Payer: COMMERCIAL

## 2019-03-25 VITALS
TEMPERATURE: 99.9 F | HEART RATE: 102 BPM | OXYGEN SATURATION: 96 % | DIASTOLIC BLOOD PRESSURE: 74 MMHG | SYSTOLIC BLOOD PRESSURE: 120 MMHG

## 2019-03-25 DIAGNOSIS — M79.10 MYALGIA: Primary | ICD-10-CM

## 2019-03-25 DIAGNOSIS — H66.003 ACUTE SUPPURATIVE OTITIS MEDIA OF BOTH EARS WITHOUT SPONTANEOUS RUPTURE OF TYMPANIC MEMBRANES, RECURRENCE NOT SPECIFIED: ICD-10-CM

## 2019-03-25 DIAGNOSIS — J02.0 STREP THROAT: ICD-10-CM

## 2019-03-25 LAB
DEPRECATED S PYO AG THROAT QL EIA: ABNORMAL
FLUAV+FLUBV AG SPEC QL: NEGATIVE
FLUAV+FLUBV AG SPEC QL: NEGATIVE
SPECIMEN SOURCE: ABNORMAL
SPECIMEN SOURCE: NORMAL

## 2019-03-25 PROCEDURE — 99213 OFFICE O/P EST LOW 20 MIN: CPT | Performed by: FAMILY MEDICINE

## 2019-03-25 PROCEDURE — 87880 STREP A ASSAY W/OPTIC: CPT | Performed by: FAMILY MEDICINE

## 2019-03-25 PROCEDURE — 87804 INFLUENZA ASSAY W/OPTIC: CPT | Performed by: FAMILY MEDICINE

## 2019-03-25 RX ORDER — CLINDAMYCIN HCL 300 MG
300 CAPSULE ORAL 4 TIMES DAILY
Qty: 40 CAPSULE | Refills: 0 | Status: SHIPPED | OUTPATIENT
Start: 2019-03-25 | End: 2019-04-04

## 2019-03-25 NOTE — LETTER
[facility]  [address] [facility] [address]  [phone]      March 25, 2019    RE:  Diogenes Cody                                                                                                                                                       [address] [facility] [address]            To whom it may concern:    Diogenes Cody is under my professional care for    Myalgia  Acute suppurative otitis media of both ears without spontaneous rupture of tympanic membranes, recurrence not specified  Strep throat.   He  may return to work with the following: No restrictions on or about 3/27/2019.          Sincerely,        Nichole Garcia    [facility]

## 2019-03-25 NOTE — TELEPHONE ENCOUNTER
Instructions from last OV on 1/16/2019 with Carol Shaquille:     Return in about 4 weeks (around 2/13/2019) for Mental Health Follow up.     Called patient and left him a detailed message to call the clinic back and schedule an appointment.    Amaya Duncan RN

## 2019-03-25 NOTE — TELEPHONE ENCOUNTER
"Requested Prescriptions   Pending Prescriptions Disp Refills     FLUoxetine (PROZAC) 20 MG capsule [Pharmacy Med Name: FLUOXETINE 20MG CAPSULES] 90 capsule 0    Last Written Prescription Date:  01/16/2019  Last Fill Quantity: 90 capsule,  # refills: 1   Last office visit: 1/16/2019 with prescribing provider:  01/16/2019   Future Office Visit:     Sig: TAKE 3 CAPSULES(60 MG) BY MOUTH DAILY    SSRIs Protocol Failed - 3/23/2019  3:44 AM       Failed - PHQ-9 score less than 5 in past 6 months    Please review last PHQ-9 score.     PHQ-9 SCORE 9/21/2018 12/5/2018 1/16/2019   PHQ-9 Total Score MyChart 10 (Moderate depression) - -   PHQ-9 Total Score 10 5 9     RADHA-7 SCORE 7/18/2018 12/5/2018 1/16/2019   Total Score - - -   Total Score 1 6 10            Passed - Medication is active on med list       Passed - Patient is age 18 or older       Passed - Recent (6 mo) or future (30 days) visit within the authorizing provider's specialty    Patient had office visit in the last 6 months or has a visit in the next 30 days with authorizing provider or within the authorizing provider's specialty.  See \"Patient Info\" tab in inbasket, or \"Choose Columns\" in Meds & Orders section of the refill encounter.            Bowen Waters XRT  "

## 2019-03-26 NOTE — PATIENT INSTRUCTIONS
Patient Education     Pharyngitis: Strep (Confirmed)    You have had a positive test for strep throat. Strep throat is a contagious illness. It is spread by coughing, kissing or by touching others after touching your mouth or nose. Symptoms include throat pain that is worse with swallowing, aching all over, headache, and fever. It is treated with antibiotic medicine. This should help you start to feel better in 1 to 2 days.  Home care    Rest at home. Drink plenty of fluids to you won't get dehydrated.    No work or school for the first 2 days of taking the antibiotics. After this time, you will not be contagious. You can then return to school or work if you are feeling better.     Take antibiotic medicine for the full 10 days, even if you feel better. This is very important to ensure the infection is treated. It is also important to prevent medicine-resistant germs from developing. If you were given an antibiotic shot, you don't need any more antibiotics.    You may use acetaminophen or ibuprofen to control pain or fever, unless another medicine was prescribed for this. Talk with your healthcare provider before taking these medicines if you have chronic liver or kidney disease. Also talk with your healthcare provider if you have had a stomach ulcer or GI bleeding.    Throat lozenges or sprays help reduce pain. Gargling with warm saltwater will also reduce throat pain. Dissolve 1/2 teaspoon of salt in 1 glass of warm water. This may be useful just before meals.     Soft foods are OK. Don't eat salty or spicy foods.  Follow-up care  Follow up with your healthcare provider or our staff if you don't get better over the next week.  When to seek medical advice  Call your healthcare provider right away if any of these occur:    Fever of 100.4 F (38 C) or higher, or as directed by your healthcare provider    New or worsening ear pain, sinus pain, or headache    Painful lumps in the back of neck    Stiff neck    Lymph  nodes getting larger or becoming soft in the middle    You can't swallow liquids or you can't open your mouth wide because of throat pain    Signs of dehydration. These include very dark urine or no urine, sunken eyes, and dizziness.    Trouble breathing or noisy breathing    Muffled voice    Rash  Prevention  Here are steps you can take to help prevent an infection:    Keep good hand washing habits.    Don t have close contact with people who have sore throats, colds, or other upper respiratory infections.    Don t smoke, and stay away from secondhand smoke.  Date Last Reviewed: 11/1/2017 2000-2018 [employer]. [address] [place] [address]. All rights reserved. This information is not intended as a substitute for professional medical care. Always follow your healthcare professional's instructions.           Patient Education     Otitis Media (Middle-Ear Infection) in Adults  Otitis media is another name for a middle-ear infection. It means an infection behind your eardrum. This kind of ear infection can happen after any condition that keeps fluid from draining from the middle ear. These conditions include allergies, a cold, a sore throat, or a respiratory infection.  Middle-ear infections are common in children, but they can also happen in adults. An ear infection in an adult may mean a more serious problem than in a child. So you may need additional tests. If you have an ear infection, you should see your health care provider for treatment.  What are the types of middle-ear infections?  Infections can affect the middle ear in several ways. They are:    Acute otitis media. This middle-ear infection occurs suddenly. It causes swelling and redness. Fluid and mucus become trapped inside the ear. You can have a fever and ear pain.    Otitis media with effusion. Fluid (effusion) and mucus build up in the middle ear after the infection goes away. You may feel like your middle ear is full.  This can continue for months and may affect your hearing.    Chronic otitis media with effusion. Fluid (effusion) remains in the middle ear for a long time. Or it builds up again and again, even though there is no infection. This type of middle-ear infection may be hard to treat. It may also affect your hearing.  Who is more likely to get a middle-ear infection?  You are more likely to get an ear infection if you:    Smoke or are around someone who smokes    Have seasonal or year-round allergy symptoms    Have a cold or other upper respiratory infection  What causes a middle-ear infection?  The middle ear connects to the throat by a canal called the eustachian tube. This tube helps even out the pressure between the outer ear and the inner ear. A cold or allergy can irritate the tube or cause the area around it to swell. This can keep fluid from draining from the middle ear. The fluid builds up behind the eardrum. Bacteria and viruses can grow in this fluid. The bacteria and viruses cause the middle-ear infection.  What are the symptoms of a middle-ear infection?  Common symptoms of a middle-ear infection in adults are:    Pain in 1 or both ears    Drainage from the ear    Muffled hearing    Sore throat   You may also have a fever. Rarely, your balance can be affected.  These symptoms may be the same as for other conditions. It s important to talk with your health care provider if you think you have a middle-ear infection. If you have a high fever, severe pain behind your ear, or paralysis in your face, see your provider as soon as you can.  How is a middle-ear infection diagnosed?  Your health care provider will take a medical history and do a physical exam. He or she will look at the outer ear and eardrum with an otoscope. The otoscope is a lighted tool that lets your provider see inside the ear. A pneumatic otoscope blows a puff of air into the ear to check how well your eardrum moves. If you eardrum doesn t move  well, it may mean you have fluid behind it.  Your provider may also do a test called tympanometry. This test tells how well the middle ear is working. It can find any changes in pressure in the middle ear. Your provider may test your hearing with a tuning fork.  How is a middle-ear infection treated?  A middle-ear infection may be treated with:    Antibiotics, taken by mouth or as ear drops    Medication for pain    Decongestants, antihistamines, or nasal steroids  Your health care provider may also have you try autoinsufflation. This helps adjust the air pressure in your ear. For this, you pinch your nose and gently exhale. This forces air back through the eustachian tube.  The exact treatment for your ear infection will depend on the type of infection you have. In general, if your symptoms don t get better in 48 to 72 hours, contact your health care provider.  Middle-ear infections can cause long-term problems if not treated. They can lead to:    Infection in other parts of the head    Permanent hearing loss    Paralysis of a nerve in your face  If you have a middle-ear infection that doesn t get better, you may need to see an ear, nose, and throat specialist (otolaryngologist). You may need a CT scan or MRI to check for head and neck cancer.  Ear tubes  Sometimes fluid stays in the middle ear even after you take antibiotics and the infection goes away. In this case, your health care provider may suggest that a small tube be placed in your ear. The tube is put at the opening of the eardrum. The tube keeps fluid from building up and relieves pressure in the middle ear. It can also help you hear better. This surgery is called myringotomy. It is not often done in adults.  The tubes usually fall out on their own after 6 months to a year.    1776-8849 [employer]. [address] [place] [address], [place] [address]. All rights reserved. This information is not intended as a substitute for professional medical care.  Always follow your healthcare professional's instructions.

## 2019-03-26 NOTE — PROGRESS NOTES
SUBJECTIVE:  Chief Complaint   Patient presents with     Urgent Care     URI     Sore throat, fever, cough, fatigue, myalgia, chills, HA. Sx started today     Diogenes Cody is a 28 year old male with a chief complaint of sore throat.  Onset of symptoms was 1 day(s) ago.    Course of illness: sudden onset, still present and constant.  Severity severe  Current and Associated symptoms: fever, chills, sweats, ear pain bilateral, sore throat, headache and fatigue, body aches  Treatment measures tried include Tylenol/Ibuprofen.  Predisposing factors include ill contact: Family member (son)  and exposure to strep.    Past Medical History:   Diagnosis Date     Concussion age 14     Depressive disorder Early      GERD (gastroesophageal reflux disease)      H/O meningitis      Hypertension 2018?     Varicella      Patient Active Problem List   Diagnosis     Moderate episode of recurrent major depressive disorder (H)     Elevated blood-pressure reading without diagnosis of hypertension     Obesity without serious comorbidity, unspecified classification, unspecified obesity type     Elevated liver enzymes         ALLERGIES:  Amoxicillin; Ceclor [cefaclor]; Sulfa drugs; Sulfasalazine; and Vantin [cefpodoxime]      Current Outpatient Medications on File Prior to Visit:  buPROPion (WELLBUTRIN XL) 150 MG 24 hr tablet Take 1 tablet (150 mg) by mouth every morning   Cholecalciferol (VITAMIN D-3 PO)    fish oil-omega-3 fatty acids 1000 MG capsule    FLUoxetine (PROZAC) 20 MG capsule Take 3 capsules (60 mg) by mouth daily   MILK THISTLE PO Take 525 mg by mouth    OMEPRAZOLE PO    [] azithromycin (ZITHROMAX Z-LASHAWN) 250 MG tablet Take 2 tablets today then 1 daily times 4 days     No current facility-administered medications on file prior to visit.     Social History     Tobacco Use     Smoking status: Former Smoker     Packs/day: 1.00     Years: 10.00     Pack years: 10.00     Types: Cigarettes, Other     Last  attempt to quit: 2018     Years since quittin.1     Smokeless tobacco: Former User   Substance Use Topics     Alcohol use: Yes     Alcohol/week: 1.2 oz     Types: 2 Shots of liquor per week     Comment: 2 per day       Family History   Problem Relation Age of Onset     Thyroid Disease Mother         Hashimoto's thyroiditis     Alcoholism Father      Substance Abuse Father         Alcohol     Other Cancer Maternal Grandfather         Skin cancer     Other - See Comments Paternal Grandmother         gun shot     Depression Sister      Anxiety Disorder Sister      Mental Illness Sister         Bi polar     Glaucoma No family hx of      Macular Degeneration No family hx of          ROS:  CONSTITUTIONAL:  fever, chills,    INTEGUMENTARY/SKIN: NEGATIVE for worrisome rashes,   EYES: NEGATIVE for vision changes or irritation  GI: NEGATIVE for nausea, abdominal pain,     OBJECTIVE:   /74 (BP Location: Right arm, Patient Position: Chair, Cuff Size: Adult Regular)   Pulse 102   Temp 99.9  F (37.7  C) (Oral)   SpO2 96%   GENERAL APPEARANCE: alert, severe distress, flushed and laying on the exam table, weak/ fatigued  EYES: EOMI,  PERRL, conjunctiva clear  HENT: TM erythematous bilateral and TM congested/bulging bilateral  NECK: supple, non-tender to palpation, no adenopathy noted  RESP: lungs clear to auscultation - no rales, rhonchi or wheezes  CV: regular rates and rhythm, normal S1 S2, no murmur noted  SKIN: no suspicious lesions or rashes    Results for orders placed or performed in visit on 19   Rapid strep screen   Result Value Ref Range    Specimen Description Throat     Rapid Strep A Screen (A)      POSITIVE: Group A Streptococcal antigen detected by immunoassay.   Influenza A/B antigen   Result Value Ref Range    Influenza A/B Agn Specimen Nasal     Influenza A Negative NEG^Negative    Influenza B Negative NEG^Negative         ASSESSMENT:  Myalgia     - Rapid strep screen  - Influenza A/B  antigen    Acute suppurative otitis media of both ears without spontaneous rupture of tympanic membranes, recurrence not specified     - clindamycin (CLEOCIN) 300 MG capsule; Take 1 capsule (300 mg) by mouth 4 times daily for 10 days    Symptomatic treatment with acetaminophen or Ibuprofen as needed for pain and for fever.         Strep throat     - clindamycin (CLEOCIN) 300 MG capsule; Take 1 capsule (300 mg) by mouth 4 times daily for 10 days       Patient was counseled that to prevent spreading the strep infection that he should stay out of public places, work or school until he has completed 24 hours of antibiotic treatment     Symptomatic treat with gargles, lozenges, and OTC analgesic as needed. Follow-up with primary clinic if not improving.    Note for work

## 2019-03-29 ENCOUNTER — MYC MEDICAL ADVICE (OUTPATIENT)
Dept: FAMILY MEDICINE | Facility: CLINIC | Age: 29
End: 2019-03-29

## 2019-03-29 NOTE — TELEPHONE ENCOUNTER
Sent my chart message to complete assessments and make appt    Chandrika Hess RN, BS  Clinical Nurse Triage.

## 2019-04-01 NOTE — TELEPHONE ENCOUNTER
Medication is being filled for 1 time refill only due to:  due for visit   Did not read [employer] message  Has not responded to phone calls  Letter sent to make appt    Chandrika Hess RN, BS  Clinical Nurse Triage.

## 2019-04-17 NOTE — TELEPHONE ENCOUNTER
Did not read [employer] message, letter was sent to schedule appt    Chandrika Hess RN, BS  Clinical Nurse Triage.

## 2019-04-24 ENCOUNTER — OFFICE VISIT (OUTPATIENT)
Dept: FAMILY MEDICINE | Facility: CLINIC | Age: 29
End: 2019-04-24
Payer: COMMERCIAL

## 2019-04-24 VITALS
SYSTOLIC BLOOD PRESSURE: 114 MMHG | RESPIRATION RATE: 16 BRPM | BODY MASS INDEX: 29.27 KG/M2 | DIASTOLIC BLOOD PRESSURE: 76 MMHG | TEMPERATURE: 98.7 F | HEART RATE: 72 BPM | WEIGHT: 198.2 LBS

## 2019-04-24 DIAGNOSIS — Z13.6 CARDIOVASCULAR SCREENING; LDL GOAL LESS THAN 160: ICD-10-CM

## 2019-04-24 DIAGNOSIS — Z13.1 SCREENING FOR DIABETES MELLITUS: ICD-10-CM

## 2019-04-24 DIAGNOSIS — F33.1 MODERATE EPISODE OF RECURRENT MAJOR DEPRESSIVE DISORDER (H): Primary | ICD-10-CM

## 2019-04-24 PROCEDURE — 99214 OFFICE O/P EST MOD 30 MIN: CPT | Performed by: NURSE PRACTITIONER

## 2019-04-24 RX ORDER — BUPROPION HYDROCHLORIDE 150 MG/1
150 TABLET ORAL EVERY MORNING
Qty: 90 TABLET | Refills: 1 | Status: SHIPPED | OUTPATIENT
Start: 2019-04-24 | End: 2020-01-09

## 2019-04-24 ASSESSMENT — ANXIETY QUESTIONNAIRES
3. WORRYING TOO MUCH ABOUT DIFFERENT THINGS: SEVERAL DAYS
1. FEELING NERVOUS, ANXIOUS, OR ON EDGE: SEVERAL DAYS
5. BEING SO RESTLESS THAT IT IS HARD TO SIT STILL: NOT AT ALL
IF YOU CHECKED OFF ANY PROBLEMS ON THIS QUESTIONNAIRE, HOW DIFFICULT HAVE THESE PROBLEMS MADE IT FOR YOU TO DO YOUR WORK, TAKE CARE OF THINGS AT HOME, OR GET ALONG WITH OTHER PEOPLE: SOMEWHAT DIFFICULT
6. BECOMING EASILY ANNOYED OR IRRITABLE: SEVERAL DAYS
GAD7 TOTAL SCORE: 4
7. FEELING AFRAID AS IF SOMETHING AWFUL MIGHT HAPPEN: NOT AT ALL
2. NOT BEING ABLE TO STOP OR CONTROL WORRYING: NOT AT ALL

## 2019-04-24 ASSESSMENT — PATIENT HEALTH QUESTIONNAIRE - PHQ9
5. POOR APPETITE OR OVEREATING: SEVERAL DAYS
SUM OF ALL RESPONSES TO PHQ QUESTIONS 1-9: 4

## 2019-04-24 NOTE — PROGRESS NOTES
SUBJECTIVE:   Diogenes Cody is a 28 year old male who presents to clinic today for the following health issues:    History of Present Illness     Depression & Anxiety Follow-up:     Depression/Anxiety:  Depression & Anxiety    Status since last visit::  Improved    Other associated symptoms of depression and anxiety::  None    Significant life event::  No    Current substance use::  None       Today's PHQ-9         PHQ-9 Total Score:         PHQ-9 Q9 Thoughts of better off dead/self-harm past 2 weeks :       Thoughts of suicide or self harm:      Self-harm Plan:        Self-harm Action:          Safety concerns for self or others:            Diet:  Low fat/cholesterol  Frequency of exercise:  2-3 days/week  Duration of exercise:  45-60 minutes  Taking medications regularly:  Yes  Medication side effects:  None  Additional concerns today:  No      PHQ-9 SCORE 12/5/2018 1/16/2019 4/24/2019   PHQ-9 Total Score MyChart - - -   PHQ-9 Total Score 5 9 4     RADHA-7 SCORE 12/5/2018 1/16/2019 4/24/2019   Total Score - - -   Total Score 6 10 4     Diogenes was seen in January for follow up.  At that time he was c/o worsening mood and was started on wellbutrin in addition to his prozac.  He reports his mood and irritability are much improved since starting wellbutrin.  He denies substance abuse, sleep issues or SI.  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  He is excited about this opportunity.      Additional history: as documented    Reviewed and updated as needed this visit by clinical staff  Tobacco  Allergies  Meds  Problems  Med Hx  Surg Hx  Fam Hx  Soc Hx          Reviewed and updated as needed this visit by Provider  Tobacco  Allergies  Meds  Problems  Med Hx  Surg Hx  Fam Hx             Current Outpatient Medications   Medication Sig Dispense Refill     buPROPion (WELLBUTRIN XL) 150 MG 24 hr tablet Take 1 tablet (150 mg) by mouth  every morning 90 tablet 1     Cholecalciferol (VITAMIN D-3 PO)        fish oil-omega-3 fatty acids 1000 MG capsule        FLUoxetine (PROZAC) 20 MG capsule Take 3 capsules (60 mg) by mouth daily 270 capsule 1     MILK THISTLE PO Take 525 mg by mouth        OMEPRAZOLE PO        Allergies   Allergen Reactions     Amoxicillin      Ceclor [Cefaclor]      Sulfa Drugs      Sulfasalazine Hives     Vantin [Cefpodoxime] Hives       ROS:  Constitutional, HEENT, cardiovascular, pulmonary, gi and gu and psych systems are negative, except as otherwise noted.    OBJECTIVE:     /76 (BP Location: Right arm, Patient Position: Sitting, Cuff Size: Adult Regular)   Pulse 72   Temp 98.7  F (37.1  C) (Oral)   Resp 16   Wt 89.9 kg (198 lb 3.2 oz)   BMI 29.27 kg/m    Body mass index is 29.27 kg/m .  GENERAL: healthy, alert and no distress  EYES: Eyes grossly normal to inspection and conjunctivae and sclerae normal  HENT: ear canals and TM's normal, nose and mouth without ulcers or lesions  NECK: no adenopathy, no asymmetry, masses, or scars and thyroid normal to palpation  RESP: lungs clear to auscultation - no rales, rhonchi or wheezes  CV: regular rate and rhythm, normal S1 S2, no S3 or S4, no murmur, click or rub, no peripheral edema and peripheral pulses strong  ABDOMEN: soft, nontender, no hepatosplenomegaly, no masses and bowel sounds normal  MS: no gross musculoskeletal defects noted, no edema  SKIN: no suspicious lesions or rashes  NEURO: Normal strength and tone, mentation intact and speech normal  PSYCH: mentation appears normal, affect normal/bright    Diagnostic Test Results:  none     ASSESSMENT/PLAN:   1. Moderate episode of recurrent major depressive disorder (H)  Improved; will continue current meds.  F/u 6 mos  - FLUoxetine (PROZAC) 20 MG capsule; Take 3 capsules (60 mg) by mouth daily  Dispense: 270 capsule; Refill: 1  - buPROPion (WELLBUTRIN XL) 150 MG 24 hr tablet; Take 1 tablet (150 mg) by mouth every  morning  Dispense: 90 tablet; Refill: 1    2. Screening for diabetes mellitus  Has a preventative healthcare form with him today from his employer.  Form signed.  Will return for fasting labs.   - Glucose; Future    3. CARDIOVASCULAR SCREENING; LDL GOAL LESS THAN 160  Has a preventative healthcare form with him today from his employer.  Form signed.  Will return for fasting labs.   - Lipid panel reflex to direct LDL Fasting; Future        YEYO Bishop [facility]

## 2019-04-24 NOTE — NURSING NOTE
"Chief Complaint   Patient presents with     Depression     Anxiety     Refill Request     Forms     Initial /76 (BP Location: Right arm, Patient Position: Sitting, Cuff Size: Adult Regular)   Pulse 72   Temp 98.7  F (37.1  C) (Oral)   Resp 16   Wt 89.9 kg (198 lb 3.2 oz)   BMI 29.27 kg/m   Estimated body mass index is 29.27 kg/m  as calculated from the following:    Height as of 1/16/19: 1.753 m (5' 9\").    Weight as of this encounter: 89.9 kg (198 lb 3.2 oz).  BP completed using cuff size regular right arm    Lisa Magill, CMA    "

## 2019-04-25 ASSESSMENT — ANXIETY QUESTIONNAIRES: GAD7 TOTAL SCORE: 4

## 2019-05-31 DIAGNOSIS — F33.1 MODERATE EPISODE OF RECURRENT MAJOR DEPRESSIVE DISORDER (H): ICD-10-CM

## 2019-05-31 RX ORDER — BUPROPION HYDROCHLORIDE 150 MG/1
TABLET ORAL
Qty: 30 TABLET | Refills: 0 | OUTPATIENT
Start: 2019-05-31

## 2019-05-31 NOTE — TELEPHONE ENCOUNTER
"Requested Prescriptions   Pending Prescriptions Disp Refills     buPROPion (WELLBUTRIN XL) 150 MG 24 hr tablet [Pharmacy Med Name: BUPROPION XL 150MG TABLETS (24 H)] 30 tablet 0     Sig: TAKE 1 TABLET(150 MG) BY MOUTH EVERY MORNING   Last Written Prescription Date:  4/24/19  Last Fill Quantity: 90,  # refills: 1   Last Office Visit: 4/24/2019 Strong      Return in about 6 months (around 10/24/2019) for Mental Health Follow up.     Future Office Visit:         SSRIs Protocol Passed - 5/31/2019  3:44 AM        Passed - PHQ-9 score less than 5 in past 6 months     PHQ-9 SCORE 12/5/2018 1/16/2019 4/24/2019   PHQ-9 Total Score MyChart - - -   PHQ-9 Total Score 5 9 4     RADHA-7 SCORE 12/5/2018 1/16/2019 4/24/2019   Total Score - - -   Total Score 6 10 4               Passed - Medication is Bupropion     If the medication is Bupropion (Wellbutrin), and the patient is taking for smoking cessation; OK to refill.          Passed - Medication is active on med list        Passed - Patient is age 18 or older        Passed - Recent (6 mo) or future (30 days) visit within the authorizing provider's specialty     Patient had office visit in the last 6 months or has a visit in the next 30 days with authorizing provider or within the authorizing provider's specialty.  See \"Patient Info\" tab in inbasket, or \"Choose Columns\" in Meds & Orders section of the refill encounter.            "

## 2019-05-31 NOTE — TELEPHONE ENCOUNTER
Duplicate  E-Prescribing Status: Receipt confirmed by pharmacy (4/24/2019  4:35 PM CDT)  Tamar Vo RN, BSN

## 2019-09-11 ENCOUNTER — TELEPHONE (OUTPATIENT)
Dept: FAMILY MEDICINE | Facility: CLINIC | Age: 29
End: 2019-09-11

## 2019-09-11 ENCOUNTER — OFFICE VISIT (OUTPATIENT)
Dept: URGENT CARE | Facility: URGENT CARE | Age: 29
End: 2019-09-11
Payer: COMMERCIAL

## 2019-09-11 VITALS
HEART RATE: 99 BPM | DIASTOLIC BLOOD PRESSURE: 72 MMHG | OXYGEN SATURATION: 96 % | TEMPERATURE: 98.7 F | SYSTOLIC BLOOD PRESSURE: 122 MMHG

## 2019-09-11 DIAGNOSIS — R07.0 THROAT PAIN: Primary | ICD-10-CM

## 2019-09-11 LAB
DEPRECATED S PYO AG THROAT QL EIA: NORMAL
SPECIMEN SOURCE: NORMAL

## 2019-09-11 PROCEDURE — 87081 CULTURE SCREEN ONLY: CPT | Performed by: FAMILY MEDICINE

## 2019-09-11 PROCEDURE — 99213 OFFICE O/P EST LOW 20 MIN: CPT | Performed by: FAMILY MEDICINE

## 2019-09-11 PROCEDURE — 87880 STREP A ASSAY W/OPTIC: CPT | Performed by: FAMILY MEDICINE

## 2019-09-11 RX ORDER — AZITHROMYCIN 250 MG/1
TABLET, FILM COATED ORAL
Qty: 6 TABLET | Refills: 0 | Status: SHIPPED | OUTPATIENT
Start: 2019-09-11 | End: 2020-01-09

## 2019-09-11 NOTE — TELEPHONE ENCOUNTER
Panel Management Review      Patient has the following on his problem list:     Depression / Dysthymia review    Measure:  Needs PHQ-9 score of 4 or less during index window.  Administer PHQ-9 and if score is 5 or more, send encounter to provider for next steps.    5 - 7 month window range: 10/24/19    PHQ-9 SCORE 12/5/2018 1/16/2019 4/24/2019   PHQ-9 Total Score MyChart - - -   PHQ-9 Total Score 5 9 4       If PHQ-9 recheck is 5 or more, route to provider for next steps.    Patient is due for:  PHQ9 and RADHA-7 due on 10/24/19.     Hypertension   Last three blood pressure readings:  BP Readings from Last 3 Encounters:   04/24/19 114/76   03/25/19 120/74   01/16/19 110/76     Blood pressure: Passed    HTN Guidelines:  Less than 140/90      Composite cancer screening  Chart review shows that this patient is due/due soon for the following None  Summary:    Patient is due/failing the following:   FOLLOW UP    Action needed:   Patient needs office visit for mental health around 10/24/19.     Type of outreach:    Sent [employer] message.    Questions for provider review:    None                                                                                                                                    Lisa Magill, CMA       Chart routed to Care Team .

## 2019-09-12 LAB
BACTERIA SPEC CULT: NORMAL
SPECIMEN SOURCE: NORMAL

## 2019-09-12 NOTE — PROGRESS NOTES
SUBJECTIVE: 29 year old male with sore throat, myalgias, swollen glands, headache and fever for 2 days. No history of rheumatic fever. Other symptoms: none.    OBJECTIVE:   Vitals as noted above.  Appears moderate distress.  Ears: abnormal: R TM erythematous; L TM erythematous  Oropharynx: moderate erythema  Neck: supple, no adenopathy, neck has FROM without masses and moderate tender anterior cervical nodes  Lungs: chest clear to IPPA and clear to IPPA  Rapid Strep test is negative    ASSESSMENT: 1. Pharyngitis    2. OM      PLAN: Per orders. Gargle, use acetaminophen or other OTC analgesic, and take Rx fully as prescribed. Call if other family members develop similar symptoms. See prn.

## 2019-09-18 NOTE — TELEPHONE ENCOUNTER
2nd attempt.  Called patient.  Offered to assist him in scheduling a appointment, but he declined.  Preferred to check his schedule first and then will call back to schedule appointment.  Lisa Magill, CMA

## 2019-11-12 DIAGNOSIS — F33.1 MODERATE EPISODE OF RECURRENT MAJOR DEPRESSIVE DISORDER (H): ICD-10-CM

## 2019-11-12 NOTE — TELEPHONE ENCOUNTER
Short term Prescription approved per [facility] Refill Protocol  Chandrika Hess RN BS

## 2019-11-12 NOTE — TELEPHONE ENCOUNTER
"Requested Prescriptions   Pending Prescriptions Disp Refills     FLUoxetine (PROZAC) 20 MG capsule [Pharmacy Med Name: FLUOXETINE 20MG CAPSULES] 270 capsule 0     Sig: TAKE 3 CAPSULES(60 MG) BY MOUTH DAILY  Last Written Prescription Date:  4/24/19   Last Fill Quantity: 270 cap,  # refills: 1   Last office visit: 4/24/2019 with prescribing provider:  Strong   Future Office Visit:   Next 5 appointments (look out 90 days)    Nov 29, 2019  4:00 PM CST  Cesar Guaman with YEYO Mendoza CNP  [facility] ([facility] [address] [place] [address], [address]  [facility] [address] [phone]             SSRIs Protocol Failed - 11/12/2019  3:45 AM        Failed - PHQ-9 score less than 5 in past 6 months     Please review last PHQ-9 score.   PHQ-9 SCORE 12/5/2018 1/16/2019 4/24/2019   PHQ-9 Total Score [place] - - -   PHQ-9 Total Score 5 9 4     RADHA-7 SCORE 12/5/2018 1/16/2019 4/24/2019   Total Score - - -   Total Score 6 10 4           Passed - Medication is active on med list        Passed - Patient is age 18 or older        Passed - Recent (6 mo) or future (30 days) visit within the authorizing provider's specialty     Patient had office visit in the last 6 months or has a visit in the next 30 days with authorizing provider or within the authorizing provider's specialty.  See \"Patient Info\" tab in inbasket, or \"Choose Columns\" in Meds & Orders section of the refill encounter.               "

## 2019-12-08 DIAGNOSIS — F33.1 MODERATE EPISODE OF RECURRENT MAJOR DEPRESSIVE DISORDER (H): ICD-10-CM

## 2019-12-09 ENCOUNTER — HEALTH MAINTENANCE LETTER (OUTPATIENT)
Age: 29
End: 2019-12-09

## 2019-12-10 NOTE — TELEPHONE ENCOUNTER
SSRIs Protocol [id number] 1:11 PM   PHQ-9 score less than 5 in past 6 months    Recent (6 mo) or future (30 days) visit within the authorizing provider's specialty     PHQ-9 SCORE 12/5/2018 1/16/2019 4/24/2019   PHQ-9 Total Score MyChart - - -   PHQ-9 Total Score 5 9 4     RADHA-7 SCORE 12/5/2018 1/16/2019 4/24/2019   Total Score - - -   Total Score 6 10 4       Schedule appt

## 2019-12-16 ENCOUNTER — MYC REFILL (OUTPATIENT)
Dept: FAMILY MEDICINE | Facility: CLINIC | Age: 29
End: 2019-12-16

## 2019-12-16 DIAGNOSIS — F33.1 MODERATE EPISODE OF RECURRENT MAJOR DEPRESSIVE DISORDER (H): ICD-10-CM

## 2019-12-17 NOTE — TELEPHONE ENCOUNTER
Prescription approved per [facility] Refill Protocol.    Mychart sent with PHQ/RADHA  Tamar Vo RN, BSN

## 2019-12-17 NOTE — TELEPHONE ENCOUNTER
Patient has an upcoming appointment on 1/9/2020.  Will give refill to get him through.    Amaya Duncan RN

## 2020-01-09 ENCOUNTER — OFFICE VISIT (OUTPATIENT)
Dept: FAMILY MEDICINE | Facility: CLINIC | Age: 30
End: 2020-01-09

## 2020-01-09 VITALS
WEIGHT: 196 LBS | HEART RATE: 88 BPM | DIASTOLIC BLOOD PRESSURE: 80 MMHG | SYSTOLIC BLOOD PRESSURE: 116 MMHG | HEIGHT: 69 IN | BODY MASS INDEX: 29.03 KG/M2 | TEMPERATURE: 98.5 F | RESPIRATION RATE: 16 BRPM

## 2020-01-09 DIAGNOSIS — Z23 NEED FOR PROPHYLACTIC VACCINATION AND INOCULATION AGAINST INFLUENZA: ICD-10-CM

## 2020-01-09 DIAGNOSIS — F33.1 MODERATE EPISODE OF RECURRENT MAJOR DEPRESSIVE DISORDER (H): Primary | ICD-10-CM

## 2020-01-09 PROCEDURE — 90686 IIV4 VACC NO PRSV 0.5 ML IM: CPT | Performed by: PHYSICIAN ASSISTANT

## 2020-01-09 PROCEDURE — 90471 IMMUNIZATION ADMIN: CPT | Performed by: PHYSICIAN ASSISTANT

## 2020-01-09 PROCEDURE — 99213 OFFICE O/P EST LOW 20 MIN: CPT | Mod: 25 | Performed by: PHYSICIAN ASSISTANT

## 2020-01-09 RX ORDER — BUPROPION HYDROCHLORIDE 150 MG/1
150 TABLET ORAL EVERY MORNING
Qty: 90 TABLET | Refills: 1 | Status: SHIPPED | OUTPATIENT
Start: 2020-01-09 | End: 2020-02-18

## 2020-01-09 ASSESSMENT — MIFFLIN-ST. JEOR: SCORE: 1826.49

## 2020-01-09 ASSESSMENT — PATIENT HEALTH QUESTIONNAIRE - PHQ9
SUM OF ALL RESPONSES TO PHQ QUESTIONS 1-9: 5
10. IF YOU CHECKED OFF ANY PROBLEMS, HOW DIFFICULT HAVE THESE PROBLEMS MADE IT FOR YOU TO DO YOUR WORK, TAKE CARE OF THINGS AT HOME, OR GET ALONG WITH OTHER PEOPLE: VERY DIFFICULT
SUM OF ALL RESPONSES TO PHQ QUESTIONS 1-9: 5

## 2020-01-09 ASSESSMENT — ANXIETY QUESTIONNAIRES
4. TROUBLE RELAXING: NOT AT ALL
3. WORRYING TOO MUCH ABOUT DIFFERENT THINGS: MORE THAN HALF THE DAYS
2. NOT BEING ABLE TO STOP OR CONTROL WORRYING: MORE THAN HALF THE DAYS
7. FEELING AFRAID AS IF SOMETHING AWFUL MIGHT HAPPEN: MORE THAN HALF THE DAYS
GAD7 TOTAL SCORE: 10
GAD7 TOTAL SCORE: 10
1. FEELING NERVOUS, ANXIOUS, OR ON EDGE: MORE THAN HALF THE DAYS
GAD7 TOTAL SCORE: 10
6. BECOMING EASILY ANNOYED OR IRRITABLE: MORE THAN HALF THE DAYS
7. FEELING AFRAID AS IF SOMETHING AWFUL MIGHT HAPPEN: MORE THAN HALF THE DAYS
5. BEING SO RESTLESS THAT IT IS HARD TO SIT STILL: NOT AT ALL
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
## 2020-01-10 ASSESSMENT — PATIENT HEALTH QUESTIONNAIRE - PHQ9: SUM OF ALL RESPONSES TO PHQ QUESTIONS 1-9: 5

## 2020-01-10 ASSESSMENT — ANXIETY QUESTIONNAIRES: GAD7 TOTAL SCORE: 10

## 2020-02-17 DIAGNOSIS — F33.1 MODERATE EPISODE OF RECURRENT MAJOR DEPRESSIVE DISORDER (H): ICD-10-CM

## 2020-02-19 NOTE — TELEPHONE ENCOUNTER
Routing refill request to provider for review/approval because:  Labs out of range:  PHQ  PHQ 1/16/2019 4/24/2019 1/9/2020   PHQ-9 Total Score 9 4 5   Q9: Thoughts of better off dead/self-harm past 2 weeks Several days Not at all Not at all     RADHA-7 SCORE 1/16/2019 4/24/2019 1/9/2020   Total Score - - 10 (moderate anxiety)   Total Score 10 4 10       Tamar Vo RN, BSN

## 2020-06-11 ENCOUNTER — MYC REFILL (OUTPATIENT)
Dept: FAMILY MEDICINE | Facility: CLINIC | Age: 30
End: 2020-06-11

## 2020-06-11 DIAGNOSIS — F33.1 MODERATE EPISODE OF RECURRENT MAJOR DEPRESSIVE DISORDER (H): ICD-10-CM

## 2020-06-11 NOTE — TELEPHONE ENCOUNTER
Routing refill request to provider for review/approval because:  PHQ-9 not at goal.     Loulou Avila, RN   [facility] -- Triage Nurse

## 2020-06-15 ENCOUNTER — TELEPHONE (OUTPATIENT)
Dept: FAMILY MEDICINE | Facility: CLINIC | Age: 30
End: 2020-06-15

## 2020-06-15 RX ORDER — BUPROPION HYDROCHLORIDE 150 MG/1
300 TABLET ORAL EVERY MORNING
Qty: 90 TABLET | Refills: 1 | OUTPATIENT
Start: 2020-06-15

## 2020-06-15 NOTE — TELEPHONE ENCOUNTER
Please call--see request from last week.  Was given 6 month supply in February.      Carol Corbin CNP

## 2020-06-15 NOTE — TELEPHONE ENCOUNTER
Pt called to schedule appointment.  Message left instructing Pt to call back and get something set up.

## 2020-06-15 NOTE — TELEPHONE ENCOUNTER
Patient schedule appointment, but should have enough medication from my understanding from below?    Nazanin Mccartney/

## 2020-06-15 NOTE — TELEPHONE ENCOUNTER
06/15/20    Pt called regarding scheduling of appointment for medication assessment.  Message left instructing Pt to give clinic a call back to get something scheduled.

## 2020-06-17 ENCOUNTER — VIRTUAL VISIT (OUTPATIENT)
Dept: FAMILY MEDICINE | Facility: CLINIC | Age: 30
End: 2020-06-17

## 2020-06-17 DIAGNOSIS — R06.83 SNORING: Primary | ICD-10-CM

## 2020-06-17 DIAGNOSIS — F33.1 MODERATE EPISODE OF RECURRENT MAJOR DEPRESSIVE DISORDER (H): ICD-10-CM

## 2020-06-17 PROCEDURE — 99213 OFFICE O/P EST LOW 20 MIN: CPT | Mod: 95 | Performed by: NURSE PRACTITIONER

## 2020-06-17 RX ORDER — BUPROPION HYDROCHLORIDE 150 MG/1
300 TABLET ORAL EVERY MORNING
Qty: 180 TABLET | Refills: 1 | Status: SHIPPED | OUTPATIENT
Start: 2020-06-17 | End: 2021-01-29

## 2020-06-17 ASSESSMENT — ANXIETY QUESTIONNAIRES
7. FEELING AFRAID AS IF SOMETHING AWFUL MIGHT HAPPEN: SEVERAL DAYS
2. NOT BEING ABLE TO STOP OR CONTROL WORRYING: SEVERAL DAYS
1. FEELING NERVOUS, ANXIOUS, OR ON EDGE: NOT AT ALL
IF YOU CHECKED OFF ANY PROBLEMS ON THIS QUESTIONNAIRE, HOW DIFFICULT HAVE THESE PROBLEMS MADE IT FOR YOU TO DO YOUR WORK, TAKE CARE OF THINGS AT HOME, OR GET ALONG WITH OTHER PEOPLE: NOT DIFFICULT AT ALL
GAD7 TOTAL SCORE: 4
6. BECOMING EASILY ANNOYED OR IRRITABLE: SEVERAL DAYS
5. BEING SO RESTLESS THAT IT IS HARD TO SIT STILL: NOT AT ALL
3. WORRYING TOO MUCH ABOUT DIFFERENT THINGS: SEVERAL DAYS

## 2020-06-17 ASSESSMENT — PATIENT HEALTH QUESTIONNAIRE - PHQ9
5. POOR APPETITE OR OVEREATING: NOT AT ALL
SUM OF ALL RESPONSES TO PHQ QUESTIONS 1-9: 2

## 2020-06-17 NOTE — PROGRESS NOTES
"Diogenes Cody is a 30 year old male who is being evaluated via a billable video visit.      The patient has been notified of following:     \"This video visit will be conducted via a call between you and your physician/provider. We have found that certain health care needs can be provided without the need for an in-person physical exam.  This service lets us provide the care you need with a video conversation.  If a prescription is necessary we can send it directly to your pharmacy.  If lab work is needed we can place an order for that and you can then stop by our lab to have the test done at a later time.    Video visits are billed at different rates depending on your insurance coverage.  Please reach out to your insurance provider with any questions.    If during the course of the call the physician/provider feels a video visit is not appropriate, you will not be charged for this service.\"    Patient has given verbal consent for Video visit? Yes    Will anyone else be joining your video visit? No      Subjective     Diogenes Cody is a 30 year old male who presents today via video visit for the following health issues:    HPI  Depression Followup    How are you doing with your depression since your last visit? No change    Are you having other symptoms that might be associated with depression? No    Have you had a significant life event?  No     Are you feeling anxious or having panic attacks?   No    Do you have any concerns with your use of alcohol or other drugs? No    Social History     Tobacco Use     Smoking status: Former Smoker     Packs/day: 1.00     Years: 10.00     Pack years: 10.00     Types: Cigarettes, Other     Last attempt to quit: 2018     Years since quittin.4     Smokeless tobacco: Former User   Substance Use Topics     Alcohol use: Yes     Alcohol/week: 2.0 standard drinks     Types: 2 Shots of liquor per week     Comment: 2 per day     Drug use: No     Types: Marijuana     PHQ " 4/24/2019 1/9/2020 6/17/2020   PHQ-9 Total Score 4 5 2   Q9: Thoughts of better off dead/self-harm past 2 weeks Not at all Not at all Not at all     RADHA-7 SCORE 4/24/2019 1/9/2020 6/17/2020   Total Score - 10 (moderate anxiety) -   Total Score 4 10 4   Suicide Assessment Five-step Evaluation and Treatment (SAFE-T)      How many servings of fruits and vegetables do you eat daily?  2-3    On average, how many sweetened beverages do you drink each day (Examples: soda, juice, sweet tea, etc.  Do NOT count diet or artificially sweetened beverages)?   1    How many days per week do you exercise enough to make your heart beat faster? 3 or less    How many minutes a day do you exercise enough to make your heart beat faster? 30 - 60    How many days per week do you miss taking your medication? 0    Overall doing well.  Laid off from work back in April (furlough).  Found out he might be able to go back to work in July.  Sleeping well.  No side effects.  Spouse has mentions that it sounds like he holds his breath while sleeping and makes weird noises during sleep.  Grinds his teeth at night.  Wakes feeling tired.      Video Start Time: 10:45 AM        Current Outpatient Medications   Medication Sig Dispense Refill     buPROPion 150 MG PO 24 hr tablet Take 2 tablets (300 mg) by mouth every morning 90 tablet 1     Cholecalciferol (VITAMIN D-3 PO)        fish oil-omega-3 fatty acids 1000 MG capsule        FLUoxetine 20 MG PO capsule TAKE 3 CAPSULES BY MOUTH ONCE DAILY 180 capsule 1     MILK THISTLE PO Take 525 mg by mouth        OMEPRAZOLE PO        Allergies   Allergen Reactions     Amoxicillin      Ceclor [Cefaclor]      Sulfa Drugs      Sulfasalazine Hives     Vantin [Cefpodoxime] Hives       Reviewed and updated as needed this visit by Provider         Review of Systems   Constitutional, HEENT, cardiovascular, pulmonary, gi and gu and psych systems are negative, except as otherwise noted.      Objective    There were no  "vitals taken for this visit.  Estimated body mass index is 29.37 kg/m  as calculated from the following:    Height as of 1/9/20: 1.74 m (5' 8.5\").    Weight as of 1/9/20: 88.9 kg (196 lb).  Physical Exam     GENERAL: Healthy, alert and no distress  EYES: Eyes grossly normal to inspection.  No discharge or erythema, or obvious scleral/conjunctival abnormalities.  RESP: No audible wheeze, cough, or visible cyanosis.  No visible retractions or increased work of breathing.    SKIN: Visible skin clear. No significant rash, abnormal pigmentation or lesions.  NEURO: Cranial nerves grossly intact.  Mentation and speech appropriate for age.  PSYCH: Mentation appears normal, affect normal/bright, judgement and insight intact, normal speech and appearance well-groomed.      Diagnostic Test Results:  Labs reviewed in Epic        Assessment & Plan     1. Snoring  Refer  - SLEEP EVALUATION & MANAGEMENT REFERRAL - ADULT -[facility]  [phone] (Age 18 and up); Future    2. Moderate episode of recurrent major depressive disorder (H)  Well controlled; continue at current dose.   - buPROPion (WELLBUTRIN XL) 150 MG 24 hr tablet; Take 2 tablets (300 mg) by mouth every morning  Dispense: 180 tablet; Refill: 1  - FLUoxetine (PROZAC) 20 MG capsule; TAKE 3 CAPSULES BY MOUTH ONCE DAILY  Dispense: 270 capsule; Refill: 1     F/u 6 mos    No follow-ups on file.    YEYO Bishop CNP  [facility]      Video-Visit Details    Type of service:  Video Visit    Video End Time:10:54 AM    Originating Location (pt. Location): Home    Distant Location (provider location):  [facility]     Platform used for Video Visit: Well    No follow-ups on file.       YEYO Bishop CNP      "

## 2020-06-18 ASSESSMENT — ANXIETY QUESTIONNAIRES: GAD7 TOTAL SCORE: 4

## 2020-06-23 ENCOUNTER — TELEPHONE (OUTPATIENT)
Dept: SLEEP MEDICINE | Facility: CLINIC | Age: 30
End: 2020-06-23

## 2020-10-09 ENCOUNTER — MYC MEDICAL ADVICE (OUTPATIENT)
Dept: FAMILY MEDICINE | Facility: CLINIC | Age: 30
End: 2020-10-09

## 2021-01-15 ENCOUNTER — HEALTH MAINTENANCE LETTER (OUTPATIENT)
Age: 31
End: 2021-01-15

## 2021-01-19 DIAGNOSIS — F33.1 MODERATE EPISODE OF RECURRENT MAJOR DEPRESSIVE DISORDER (H): ICD-10-CM

## 2021-01-29 ENCOUNTER — VIRTUAL VISIT (OUTPATIENT)
Dept: FAMILY MEDICINE | Facility: CLINIC | Age: 31
End: 2021-01-29
Payer: COMMERCIAL

## 2021-01-29 DIAGNOSIS — F33.1 MODERATE EPISODE OF RECURRENT MAJOR DEPRESSIVE DISORDER (H): ICD-10-CM

## 2021-01-29 PROCEDURE — 99213 OFFICE O/P EST LOW 20 MIN: CPT | Mod: 95 | Performed by: PHYSICIAN ASSISTANT

## 2021-01-29 RX ORDER — BUPROPION HYDROCHLORIDE 300 MG/1
300 TABLET ORAL EVERY MORNING
Qty: 90 TABLET | Refills: 1 | Status: SHIPPED | OUTPATIENT
Start: 2021-01-29 | End: 2021-06-11

## 2021-01-29 ASSESSMENT — PATIENT HEALTH QUESTIONNAIRE - PHQ9: SUM OF ALL RESPONSES TO PHQ QUESTIONS 1-9: 9

## 2021-01-29 NOTE — PROGRESS NOTES
Parveen is a 30 year old who is being evaluated via a billable video visit.      How would you like to obtain your AVS? MyChart  If the video visit is dropped, the invitation should be resent by: Text to cell phone: [phone]  Will anyone else be joining your video visit? No    Video Start Time: 1449  Assessment & Plan     Moderate episode of recurrent major depressive disorder (H)  Controlled per pt   Will change to 300 mg Wellbutrin  XL-1 tab daily vs. Previous 150 mg, 2 tabs daily.  Was on 150 mg 2 tabs daily d/t being w/o insurance.     - FLUoxetine (PROZAC) 20 MG capsule; TAKE 3 CAPSULES BY MOUTH ONCE DAILY  - buPROPion (WELLBUTRIN XL) 300 MG 24 hr tablet; Take 1 tablet (300 mg) by mouth every morning  See Patient Instructions    Return in about 6 months (around 7/29/2021) for Mood/Mental Health Recheck.    Annmarie Fernandez PA-C  [facility]     Parveen is a 30 year old who presents to clinic today for the following health issues     HPI   Future Appointments   Date Time Provider Department Center   1/29/2021  2:40 PM Annmarie Fernandez PA-C [facility]     Appointment Notes for this encounter:   Need my meds refilled    Health Maintenance Due   Topic Date Due     PREVENTIVE CARE VISIT  1990     ANNUAL REVIEW OF HM ORDERS  1990     HIV SCREENING  05/31/2003     HEPATITIS C SCREENING  05/31/2006     INFLUENZA VACCINE (1) 09/01/2020     PHQ-9  12/17/2020         Depression Followup    How are you doing with your depression since your last visit? No change    Are you having other symptoms that might be associated with depression? No    Have you had a significant life event?  No     Are you feeling anxious or having panic attacks?   No    Do you have any concerns with your use of alcohol or other drugs? No    Social History     Tobacco Use     Smoking status: Former Smoker     Packs/day: 1.00     Years: 10.00     Pack years: 10.00     Types: Cigarettes, Other     Quit  date: 1/18/2018     Years since quitting: 3.0     Smokeless tobacco: Former User   Substance Use Topics     Alcohol use: Yes     Alcohol/week: 2.0 standard drinks     Types: 2 Shots of liquor per week     Comment: 2 per day     Drug use: No     Types: Marijuana     PHQ 4/24/2019 1/9/2020 6/17/2020   PHQ-9 Total Score 4 5 2   Q9: Thoughts of better off dead/self-harm past 2 weeks Not at all Not at all Not at all     RADHA-7 SCORE 4/24/2019 1/9/2020 6/17/2020   Total Score - 10 (moderate anxiety) -   Total Score 4 10 4     Last PHQ-9 1/29/2021   1.  Little interest or pleasure in doing things 1   2.  Feeling down, depressed, or hopeless 1   3.  Trouble falling or staying asleep, or sleeping too much 3   4.  Feeling tired or having little energy 3   5.  Poor appetite or overeating 0   6.  Feeling bad about yourself 1   7.  Trouble concentrating 0   8.  Moving slowly or restless 0   Q9: Thoughts of better off dead/self-harm past 2 weeks 0   PHQ-9 Total Score 9   Difficulty at work, home, or with people Very difficult     RADHA-7  6/17/2020   1. Feeling nervous, anxious, or on edge 0   2. Not being able to stop or control worrying 1   3. Worrying too much about different things 1   4. Trouble relaxing 0   5. Being so restless that it is hard to sit still 0   6. Becoming easily annoyed or irritable 1   7. Feeling afraid, as if something awful might happen 1   RADHA-7 Total Score 4   If you checked any problems, how difficult have they made it for you to do your work, take care of things at home, or get along with other people? Not difficult at all     In the past two weeks have you had thoughts of suicide or self-harm?  No.    Do you have concerns about your personal safety or the safety of others?   No          Review of Systems   Constitutional, HEENT, cardiovascular, pulmonary, gi and gu systems are negative, except as otherwise noted.      Objective           Vitals:  No vitals were obtained today due to virtual  visit.    Physical Exam   GENERAL: Healthy, alert and no distress  EYES: Eyes grossly normal to inspection.  No discharge or erythema, or obvious scleral/conjunctival abnormalities.  RESP: No audible wheeze, cough, or visible cyanosis.  No visible retractions or increased work of breathing.    SKIN: Visible skin clear. No significant rash, abnormal pigmentation or lesions.  NEURO: Cranial nerves grossly intact.  Mentation and speech appropriate for age.  PSYCH: Mentation appears normal, affect normal/bright, judgement and insight intact, normal speech and appearance well-groomed.                Video-Visit Details    Type of service:  Video Visit    Video End Time:2:59 PM    Originating Location (pt. Location): Home    Distant Location (provider location):  [facility] [place]     Platform used for Video Visit: Dr. Tariff

## 2021-03-31 ENCOUNTER — IMMUNIZATION (OUTPATIENT)
Dept: NURSING | Facility: CLINIC | Age: 31
End: 2021-03-31
Payer: COMMERCIAL

## 2021-03-31 PROCEDURE — 91300 PR COVID VAC PFIZER DIL RECON 30 MCG/0.3 ML IM: CPT

## 2021-03-31 PROCEDURE — 0001A PR COVID VAC PFIZER DIL RECON 30 MCG/0.3 ML IM: CPT

## 2021-04-19 ENCOUNTER — ANCILLARY PROCEDURE (OUTPATIENT)
Dept: GENERAL RADIOLOGY | Facility: CLINIC | Age: 31
End: 2021-04-19
Attending: FAMILY MEDICINE
Payer: COMMERCIAL

## 2021-04-19 ENCOUNTER — OFFICE VISIT (OUTPATIENT)
Dept: FAMILY MEDICINE | Facility: CLINIC | Age: 31
End: 2021-04-19
Payer: COMMERCIAL

## 2021-04-19 VITALS
RESPIRATION RATE: 16 BRPM | SYSTOLIC BLOOD PRESSURE: 134 MMHG | BODY MASS INDEX: 32.84 KG/M2 | HEART RATE: 99 BPM | OXYGEN SATURATION: 96 % | DIASTOLIC BLOOD PRESSURE: 85 MMHG | WEIGHT: 219.2 LBS | TEMPERATURE: 98.7 F

## 2021-04-19 DIAGNOSIS — R10.84 ABDOMINAL PAIN, GENERALIZED: Primary | ICD-10-CM

## 2021-04-19 DIAGNOSIS — R10.84 ABDOMINAL PAIN, GENERALIZED: ICD-10-CM

## 2021-04-19 PROCEDURE — 99214 OFFICE O/P EST MOD 30 MIN: CPT | Performed by: FAMILY MEDICINE

## 2021-04-19 PROCEDURE — 74019 RADEX ABDOMEN 2 VIEWS: CPT | Mod: TC | Performed by: RADIOLOGY

## 2021-04-19 ASSESSMENT — ANXIETY QUESTIONNAIRES
GAD7 TOTAL SCORE: 5
7. FEELING AFRAID AS IF SOMETHING AWFUL MIGHT HAPPEN: SEVERAL DAYS
3. WORRYING TOO MUCH ABOUT DIFFERENT THINGS: SEVERAL DAYS
4. TROUBLE RELAXING: SEVERAL DAYS
7. FEELING AFRAID AS IF SOMETHING AWFUL MIGHT HAPPEN: SEVERAL DAYS
6. BECOMING EASILY ANNOYED OR IRRITABLE: NOT AT ALL
2. NOT BEING ABLE TO STOP OR CONTROL WORRYING: SEVERAL DAYS
GAD7 TOTAL SCORE: 5
GAD7 TOTAL SCORE: 5
1. FEELING NERVOUS, ANXIOUS, OR ON EDGE: SEVERAL DAYS
5. BEING SO RESTLESS THAT IT IS HARD TO SIT STILL: NOT AT ALL

## 2021-04-19 ASSESSMENT — PATIENT HEALTH QUESTIONNAIRE - PHQ9
10. IF YOU CHECKED OFF ANY PROBLEMS, HOW DIFFICULT HAVE THESE PROBLEMS MADE IT FOR YOU TO DO YOUR WORK, TAKE CARE OF THINGS AT HOME, OR GET ALONG WITH OTHER PEOPLE: SOMEWHAT DIFFICULT
SUM OF ALL RESPONSES TO PHQ QUESTIONS 1-9: 7
SUM OF ALL RESPONSES TO PHQ QUESTIONS 1-9: 7

## 2021-04-19 NOTE — PROGRESS NOTES
Assessment & Plan     Abdominal pain, generalized  - xray significant for gas pattern otherwise unremarkable. Reviewed prior abdominal US from 2018 which showed gallstones- if worsening of symptoms will recheck US. Supportive care discussed.   - XR Abdomen 2 Views; Future    {Provider  Link to [facility] Help Grid :367015}     Tobacco Cessation:   reports that he has been smoking other. He has a 10.00 pack-year smoking history. He has quit using smokeless tobacco.      See Patient Instructions    Return in about 2 weeks (around 5/3/2021) for Abdominal pain , in person, with your primary care physician.    Bernarda Bear MD  [facility] JONNIE Saini is a 30 year old who presents for the following health issues     Musculoskeletal Problem    History of Present Illness       He eats 0-1 servings of fruits and vegetables daily.He consumes 8 sweetened beverage(s) daily.He exercises with enough effort to increase his heart rate 30 to 60 minutes per day.  He exercises with enough effort to increase his heart rate 3 or less days per week.   He is taking medications regularly.       Abdominal/Flank Pain  Onset/Duration: X2.5 weeks ago  Description:   Character: Sharp and Stabbing  Location: right lower quadrant  Radiation: None  Intensity: 10/10  Progression of Symptoms:  Improving   Accompanying Signs & Symptoms:  Fever/Chills: no  Gas/Bloating: no  Nausea: no  Vomiting: YES- 3 to 5 and just started happening   Diarrhea: no  Constipation: no  Dysuria or Hematuria: no  History:   Trauma: no  Previous similar pain: no  Previous tests done: none  Precipitating factors:   Does the pain change with:     Food: no    Bowel Movement: no    Urination: no   Other factors:  YES- Laying on his side or stomach it hurts a lot worse  Therapies tried and outcome: ibuprofen with come relief     Pain with deep inhalation.   Per patient he missed a step and landed on his right side. Has had pain  since then.              Review of Systems   Constitutional, HEENT, cardiovascular, pulmonary, GI, , musculoskeletal, neuro, skin, endocrine and psych systems are negative, except as otherwise noted.      Objective    /85 (BP Location: Right arm, Patient Position: Sitting, Cuff Size: Adult Large)   Pulse 99   Temp 98.7  F (37.1  C) (Oral)   Resp 16   Wt 99.4 kg (219 lb 3.2 oz)   SpO2 96%   BMI 32.84 kg/m    Body mass index is 32.84 kg/m .  Physical Exam   GENERAL: healthy, alert and no distress  NECK: no adenopathy, no asymmetry, masses, or scars and thyroid normal to palpation  RESP: lungs clear to auscultation - no rales, rhonchi or wheezes  CV: regular rate and rhythm, normal S1 S2  ABDOMEN: bowel sounds normal, mild generalized tenderness- no rebound tenderness or guarding.   MS: no gross musculoskeletal defects noted, no edema  PSYCH: mentation appears normal, affect normal/bright    Results for orders placed or performed in visit on 04/19/21   XR Abdomen 2 Views     Status: None    Narrative    ABDOMEN TWO VIEWS 4/19/2021 2:16 PM     HISTORY: Abdominal pain, generalized.    COMPARISON: None.      Impression    IMPRESSION: Nonobstructive gas pattern. Normal stool volume. No free  air. No abnormal calcifications.    MICHAEL FLOWER MD               Answers for HPI/ROS submitted by the patient on 4/19/2021   Chronic problems general questions HPI Form  If you checked off any problems, how difficult have these problems made it for you to do your work, take care of things at home, or get along with other people?: Somewhat difficult  PHQ9 TOTAL SCORE: 7  RADHA 7 TOTAL SCORE: 5

## 2021-04-19 NOTE — PATIENT INSTRUCTIONS
Patient Education     Gallstones with Biliary Colic    Your abdominal pain due to irritation and spasm of the gallbladder. This is called biliary colic. The gallbladder is a small sac under the liver, which stores and releases a bile. Bile is a fluid made in the liver that aids in the digestion of fat. A collection of crystals may form stones inside the gallbladder (gallstones). Gallstones can cause the gallbladder to spasm. If they block the duct out of the gallbladder, they can cause pain and even an infection.   A number of factors increase the risk for having gallstones:    Being female    Being severely overweight (obese)    Older age    Losing or gaining weight quickly    Eating a high-calorie diet    Being pregnant    Taking hormone therapy    Having diabetes  Home care    Rest in bed.    Drink only clear liquids until you feel better.    You may have been prescribed medicine for pain or nausea. Take these as directed.    Fat in your diet makes the gallbladder contract and may cause increased pain. Don't eat foods that are high in fat (such as full-fat dairy, fried foods, and fatty meats) for at least 2 days.    If you are overweight, talk to your healthcare provider about losing weight.    Follow-up care  Follow up with your healthcare provider or as advised. There is a chance that you will have another episode of pain from your gallstones at some point. Removal of the gallbladder is an option to prevent this. Talk with your healthcare provider about your treatment options.  When to seek medical advice  Call your healthcare provider if any of the following occur:    Worsening pain or pain lasting for longer than 6 hours    Pain moving to the right lower belly    Repeated vomiting    Swollen belly    Fever of 100.4 F (38 C) or higher, or as directed by your healthcare provider    Very dark urine, light colored stools, or yellow color of the skin or eyes    Chest, arm, back, neck or jaw pain  [employer] last  reviewed this educational content on 3/1/2018    8317-1657 [employer]. All rights reserved. This information is not intended as a substitute for professional medical care. Always follow your healthcare professional's instructions.

## 2021-04-20 ASSESSMENT — ANXIETY QUESTIONNAIRES: GAD7 TOTAL SCORE: 5

## 2021-04-20 ASSESSMENT — PATIENT HEALTH QUESTIONNAIRE - PHQ9: SUM OF ALL RESPONSES TO PHQ QUESTIONS 1-9: 7

## 2021-04-21 ENCOUNTER — MYC MEDICAL ADVICE (OUTPATIENT)
Dept: FAMILY MEDICINE | Facility: CLINIC | Age: 31
End: 2021-04-21

## 2021-04-21 ENCOUNTER — IMMUNIZATION (OUTPATIENT)
Dept: NURSING | Facility: CLINIC | Age: 31
End: 2021-04-21
Attending: INTERNAL MEDICINE
Payer: COMMERCIAL

## 2021-04-21 DIAGNOSIS — R10.84 ABDOMINAL PAIN, GENERALIZED: Primary | ICD-10-CM

## 2021-04-21 PROCEDURE — 91300 PR COVID VAC PFIZER DIL RECON 30 MCG/0.3 ML IM: CPT

## 2021-04-21 PROCEDURE — 0002A PR COVID VAC PFIZER DIL RECON 30 MCG/0.3 ML IM: CPT

## 2021-04-21 NOTE — TELEPHONE ENCOUNTER
Routing to Dr Bear,    Please see patient mychart message regarding imaging done 4/19    Deny Shafer RN

## 2021-04-28 ENCOUNTER — OFFICE VISIT (OUTPATIENT)
Dept: URGENT CARE | Facility: URGENT CARE | Age: 31
End: 2021-04-28
Payer: COMMERCIAL

## 2021-04-28 VITALS
DIASTOLIC BLOOD PRESSURE: 60 MMHG | BODY MASS INDEX: 32.19 KG/M2 | RESPIRATION RATE: 20 BRPM | SYSTOLIC BLOOD PRESSURE: 120 MMHG | OXYGEN SATURATION: 96 % | HEART RATE: 116 BPM | WEIGHT: 214.8 LBS | TEMPERATURE: 98.8 F

## 2021-04-28 DIAGNOSIS — R10.9 RIGHT-SIDED ABDOMINAL PAIN OF UNKNOWN ETIOLOGY: Primary | ICD-10-CM

## 2021-04-28 LAB
ALBUMIN UR-MCNC: 30 MG/DL
APPEARANCE UR: CLEAR
BACTERIA #/AREA URNS HPF: ABNORMAL /HPF
BASOPHILS # BLD AUTO: 0 10E9/L (ref 0–0.2)
BASOPHILS NFR BLD AUTO: 0.2 %
BILIRUB UR QL STRIP: ABNORMAL
COLOR UR AUTO: YELLOW
DIFFERENTIAL METHOD BLD: NORMAL
EOSINOPHIL # BLD AUTO: 0.1 10E9/L (ref 0–0.7)
EOSINOPHIL NFR BLD AUTO: 0.7 %
ERYTHROCYTE [DISTWIDTH] IN BLOOD BY AUTOMATED COUNT: 13 % (ref 10–15)
GLUCOSE UR STRIP-MCNC: NEGATIVE MG/DL
HCT VFR BLD AUTO: 48.3 % (ref 40–53)
HGB BLD-MCNC: 16 G/DL (ref 13.3–17.7)
HGB UR QL STRIP: ABNORMAL
HYALINE CASTS #/AREA URNS LPF: ABNORMAL /LPF
KETONES UR STRIP-MCNC: NEGATIVE MG/DL
LEUKOCYTE ESTERASE UR QL STRIP: NEGATIVE
LYMPHOCYTES # BLD AUTO: 2.6 10E9/L (ref 0.8–5.3)
LYMPHOCYTES NFR BLD AUTO: 25 %
MCH RBC QN AUTO: 27.4 PG (ref 26.5–33)
MCHC RBC AUTO-ENTMCNC: 33.1 G/DL (ref 31.5–36.5)
MCV RBC AUTO: 83 FL (ref 78–100)
MONOCYTES # BLD AUTO: 0.8 10E9/L (ref 0–1.3)
MONOCYTES NFR BLD AUTO: 7.1 %
MUCOUS THREADS #/AREA URNS LPF: PRESENT /LPF
NEUTROPHILS # BLD AUTO: 7.1 10E9/L (ref 1.6–8.3)
NEUTROPHILS NFR BLD AUTO: 67 %
NITRATE UR QL: NEGATIVE
NON-SQ EPI CELLS #/AREA URNS LPF: ABNORMAL /LPF
PH UR STRIP: 5.5 PH (ref 5–7)
PLATELET # BLD AUTO: 308 10E9/L (ref 150–450)
RBC # BLD AUTO: 5.83 10E12/L (ref 4.4–5.9)
RBC #/AREA URNS AUTO: ABNORMAL /HPF
SOURCE: ABNORMAL
SP GR UR STRIP: >1.03 (ref 1–1.03)
UROBILINOGEN UR STRIP-ACNC: 0.2 EU/DL (ref 0.2–1)
WBC # BLD AUTO: 10.5 10E9/L (ref 4–11)
WBC #/AREA URNS AUTO: ABNORMAL /HPF

## 2021-04-28 PROCEDURE — 85025 COMPLETE CBC W/AUTO DIFF WBC: CPT | Performed by: PHYSICIAN ASSISTANT

## 2021-04-28 PROCEDURE — 81001 URINALYSIS AUTO W/SCOPE: CPT | Performed by: PHYSICIAN ASSISTANT

## 2021-04-28 PROCEDURE — 80053 COMPREHEN METABOLIC PANEL: CPT | Performed by: PHYSICIAN ASSISTANT

## 2021-04-28 PROCEDURE — 99214 OFFICE O/P EST MOD 30 MIN: CPT | Performed by: PHYSICIAN ASSISTANT

## 2021-04-28 PROCEDURE — 83690 ASSAY OF LIPASE: CPT | Performed by: PHYSICIAN ASSISTANT

## 2021-04-28 PROCEDURE — 87086 URINE CULTURE/COLONY COUNT: CPT | Performed by: PHYSICIAN ASSISTANT

## 2021-04-28 PROCEDURE — 36415 COLL VENOUS BLD VENIPUNCTURE: CPT | Performed by: PHYSICIAN ASSISTANT

## 2021-04-28 NOTE — LETTER
April 28, 2021      Diogenes Cody  [address] [place] [address]        To Whom It May Concern:    Diogenes Cody  was seen on 4/28/2021  Please excuse his absence from work today due to acute medical illness.     Dx: right mid abdominal pain    Sincerely,        Veronica Paz PA-C

## 2021-04-28 NOTE — TELEPHONE ENCOUNTER
pcp out of office. Ultrasound ordered given symptoms. Should call patient to set this up in ~2 days or so. Please inform patient.     -leann roberts, pac

## 2021-04-28 NOTE — TELEPHONE ENCOUNTER
Routing to Dr. Stone Montgomery,    Patient interested in pursuing ultrasound due to continued symptoms. See [employer] message for further info    Deny Shafer RN

## 2021-04-28 NOTE — PROGRESS NOTES
Assessment & Plan     Right-sided abdominal pain of unknown etiology  Intermittent pain for the past 1 month.  He had an abdominal x-ray last week which was otherwise unremarkable.  He is scheduled for a right upper quadrant abdominal ultrasound on May 10th.  Today on exam, he is in no acute distress.  CBC is unremarkable.  Urinalysis not suggestive of infection.  CMP and lipase are pending.  Patient will be notified if any abnormal results.  In the interim, keep monitoring symptoms.  NSAIDs as needed for pain.  Follow-up if any worsening symptoms.  He agrees with the plan.    - CBC with platelets and differential  - Comprehensive metabolic panel (BMP + Alb, Alk Phos, ALT, AST, Total. Bili, TP)  - Lipase  - UA reflex to Microscopic and Culture  - Urine Microscopic  - Urine Culture Aerobic Bacterial         Return in about 2 weeks (around 5/12/2021) for Symptoms failing to improve.    Veronica Paz PA-C  [facility] [place] S[place] is a 30 year old male who presents to clinic today for the following health issues:  Chief Complaint   Patient presents with     Gastrointestinal Problem     1 month on and off      HPI      Abdominal Pain    Location:  Right mid abdomen  Radiation: None.    Pain character: intermittent sharp  Severity: 7 on a scale of 1-10.    Duration: 1 month(s)   Course of Illness: fluctuating.  Exacerbated by: nothing  Relieved by: nothing.  Associated Symptoms: nausea. He will sometimes vomit due to the pain.  Surgical History: none  Treatment tried: Ibuprofen, acetaminophen  He reports his bowel movements are normal. No diarrhea.  No dysuria.  No fevers or chills.  He notes that prior to the start of this mid abdominal pain a month ago he did trip and landed on his right side.  He was seen last week, had an abdominal x-ray done which was negative for acute findings.  He has an ultrasound of the right upper quadrant scheduled for May 10th.  Last night the pain  kept him up and he missed work today reason which prompted this visit today. Patient also wondering if perhaps he has a hernia at the affected area that could be causing his pain.      Review of Systems  Constitutional, HEENT, cardiovascular, pulmonary, gi and gu systems are negative, except as otherwise noted.      Objective    /60 (BP Location: Right arm, Patient Position: Chair, Cuff Size: Adult Large)   Pulse 116   Temp 98.8  F (37.1  C) (Oral)   Resp 20   Wt 97.4 kg (214 lb 12.8 oz)   SpO2 96%   BMI 32.19 kg/m    Physical Exam   GENERAL: healthy, alert and no distress  RESP: lungs clear to auscultation - no rales, rhonchi or wheezes  CV: regular rate and rhythm, normal S1 S2  ABDOMEN: soft, mildly tender right mid abdomen, no hepatosplenomegaly, no masses, bowel sounds normal, no hernia      Results for orders placed or performed in visit on 04/28/21 (from the past 24 hour(s))   CBC with platelets and differential   Result Value Ref Range    WBC 10.5 4.0 - 11.0 10e9/L    RBC Count 5.83 4.4 - 5.9 10e12/L    Hemoglobin 16.0 13.3 - 17.7 g/dL    Hematocrit 48.3 40.0 - 53.0 %    MCV 83 78 - 100 fl    MCH 27.4 26.5 - 33.0 pg    MCHC 33.1 31.5 - 36.5 g/dL    RDW 13.0 10.0 - 15.0 %    Platelet Count 308 150 - 450 10e9/L    % Neutrophils 67.0 %    % Lymphocytes 25.0 %    % Monocytes 7.1 %    % Eosinophils 0.7 %    % Basophils 0.2 %    Absolute Neutrophil 7.1 1.6 - 8.3 10e9/L    Absolute Lymphocytes 2.6 0.8 - 5.3 10e9/L    Absolute Monocytes 0.8 0.0 - 1.3 10e9/L    Absolute Eosinophils 0.1 0.0 - 0.7 10e9/L    Absolute Basophils 0.0 0.0 - 0.2 10e9/L    Diff Method Automated Method    UA reflex to Microscopic and Culture    Specimen: Midstream Urine   Result Value Ref Range    Color Urine Yellow     Appearance Urine Clear     Glucose Urine Negative NEG^Negative mg/dL    Bilirubin Urine Small (A) NEG^Negative    Ketones Urine Negative NEG^Negative mg/dL    Specific Gravity Urine >1.030 1.003 - 1.035    Blood  Urine Moderate (A) NEG^Negative    pH Urine 5.5 5.0 - 7.0 pH    Protein Albumin Urine 30 (A) NEG^Negative mg/dL    Urobilinogen Urine 0.2 0.2 - 1.0 EU/dL    Nitrite Urine Negative NEG^Negative    Leukocyte Esterase Urine Negative NEG^Negative    Source Midstream Urine    Urine Microscopic   Result Value Ref Range    WBC Urine 0 - 5 OTO5^0 - 5 /HPF    RBC Urine O - 2 OTO2^O - 2 /HPF    Hyaline Casts 2-5 (A) OTO2^O - 2 /LPF    Squamous Epithelial /LPF Urine Few FEW^Few /LPF    Bacteria Urine Few (A) NEG^Negative /HPF    Mucous Urine Present (A) NEG^Negative /LPF

## 2021-04-28 NOTE — PATIENT INSTRUCTIONS
Patient Education     * Abdominal Pain, Unknown Cause [Male]  Based on your visit today, the exact cause of your abdominal stomach pain is not clear. Your exam and tests do not indicate a dangerous cause at this time. However, the signs of a serious problem may take more time to appear. Although your exam was reassuring today, sometimes early in the course of many conditions, exam and lab tests can appear normal. Therefore, it is important for you to watch for any new symptoms or worsening of your condition.  Causes:  It may not be obvious what caused your symptoms. Pay attention to things that do seem to make your symptoms worse or better and discuss this with your doctor when you follow up.  Diagnosis:  The evaluation of abdominal pain in the emergency department may only require an exam by the doctor or it may include blood, urine or imaging studies, depending on many factors. Sometimes exams and tests can identify a cause but in many cases, a clear cause is not found. Further testing at follow up visits may help to suggest a clear diagnosis.  Home Care:    Rest as much as possible until your next exam.    Try to avoid any medications (unless otherwise directed by your doctor), foods, activities, or other factors that you may have contributed to your symptoms.    Try to eat foods that you know that you have tolerated well in the past. Certain diets may be recommended for some conditions that cause abdominal pain. However, since the cause of your symptoms may not be clear, discuss your diet more with your primary care provider or specialist for further recommendations.     Eating several small meals per day as opposed to 2 or 3 larger meals may help.    Monitor closely for anything that may make your symptoms worse or better. Pay close attention to symptoms below that may indicate worsening of your condition.  Follow Up And Precautions:  See your doctor or this facility as instructed (or sooner if your symptoms  are not improving). In some cases, you may need more testing.  Contact Your Doctor Or Seek Medical Attention  if any of the following occur:    Pain is becoming worse    You are unable to take your medications because of too much vomiting.    Swelling of the abdomen    Fever of 101 F (38.3 C) or higher, or as directed by your health care provider    Blood in vomit or bowel movements (dark red or black color)    Jaundice (yellow color of eyes and skin)    New onset of weakness, dizziness or fainting    New onset of chest, arm, back, neck or jaw pain  For informational purposes only. Not to replace the advice of your health care provider.  Copyright   2018 [facility]. All rights reserved.

## 2021-04-29 LAB
ALBUMIN SERPL-MCNC: 4.1 G/DL (ref 3.4–5)
ALP SERPL-CCNC: 108 U/L (ref 40–150)
ALT SERPL W P-5'-P-CCNC: 34 U/L (ref 0–70)
ANION GAP SERPL CALCULATED.3IONS-SCNC: 1 MMOL/L (ref 3–14)
AST SERPL W P-5'-P-CCNC: 18 U/L (ref 0–45)
BILIRUB SERPL-MCNC: 0.6 MG/DL (ref 0.2–1.3)
BUN SERPL-MCNC: 12 MG/DL (ref 7–30)
CALCIUM SERPL-MCNC: 9.4 MG/DL (ref 8.5–10.1)
CHLORIDE SERPL-SCNC: 107 MMOL/L (ref 94–109)
CO2 SERPL-SCNC: 30 MMOL/L (ref 20–32)
CREAT SERPL-MCNC: 1 MG/DL (ref 0.66–1.25)
GFR SERPL CREATININE-BSD FRML MDRD: >90 ML/MIN/{1.73_M2}
GLUCOSE SERPL-MCNC: 84 MG/DL (ref 70–99)
LIPASE SERPL-CCNC: 63 U/L (ref 73–393)
POTASSIUM SERPL-SCNC: 4.1 MMOL/L (ref 3.4–5.3)
PROT SERPL-MCNC: 7.7 G/DL (ref 6.8–8.8)
SODIUM SERPL-SCNC: 138 MMOL/L (ref 133–144)

## 2021-04-30 LAB
BACTERIA SPEC CULT: NORMAL
SPECIMEN SOURCE: NORMAL

## 2021-05-10 ENCOUNTER — HOSPITAL ENCOUNTER (OUTPATIENT)
Dept: ULTRASOUND IMAGING | Facility: CLINIC | Age: 31
Discharge: HOME | End: 2021-05-10
Attending: PHYSICIAN ASSISTANT | Admitting: PHYSICIAN ASSISTANT
Payer: COMMERCIAL

## 2021-05-10 DIAGNOSIS — R10.84 ABDOMINAL PAIN, GENERALIZED: ICD-10-CM

## 2021-05-10 PROCEDURE — 76705 ECHO EXAM OF ABDOMEN: CPT

## 2021-05-11 DIAGNOSIS — K81.9 CHOLECYSTITIS: Primary | ICD-10-CM

## 2021-05-17 ENCOUNTER — VIRTUAL VISIT (OUTPATIENT)
Dept: SURGERY | Facility: CLINIC | Age: 31
End: 2021-05-17
Payer: COMMERCIAL

## 2021-05-17 VITALS — WEIGHT: 214 LBS | RESPIRATION RATE: 16 BRPM | HEIGHT: 69 IN | BODY MASS INDEX: 31.7 KG/M2

## 2021-05-17 DIAGNOSIS — K81.9 CHOLECYSTITIS: Primary | ICD-10-CM

## 2021-05-17 PROCEDURE — 99203 OFFICE O/P NEW LOW 30 MIN: CPT | Mod: GT | Performed by: SURGERY

## 2021-05-17 ASSESSMENT — MIFFLIN-ST. JEOR: SCORE: 1911.08

## 2021-05-17 NOTE — LETTER
May 17, 2021    RE: Diogenes Cody, : 1990      Chief complaint:  Abdominal pain, right upper quadrant     HPI:  This patient is a 30 year old male seen via video visit who presents with episodic RUQ pain.  Denies much pain now.  Last severe episode was about 2 weeks ago.  U/S done last week.     Past Medical History:  Has a past medical history of Concussion (age 14), Depressive disorder (Early ), GERD (gastroesophageal reflux disease), H/O meningitis, Hypertension (2018?), and Varicella.     Past Surgical History:  Denies     Review of Systems:  The 10 point Review of Systems is negative other than noted in the HPI and above.     Physical Exam:  Limited by video visit  General - This is a well developed, well nourished male in no apparent distress.  Lungs - Breathing nonlabored   Skin shows no obvious jaundice.     Relevant labs:  Normal LFTs     Imaging:  Gallstones and sludge in gallbladder.  6mm gallbladder wall.     Assessment and Plan:  This is a patient with RUQ pain and gallstones, as well as wall thickening concerning for cholecystitis.  I have recommended Laparoscopic cholecystectomy.  Discussed procedure, risks and complications.  We will work on scheduling surgery at the patient s convenience.         Tato Rausch MD  [facility]

## 2021-05-17 NOTE — PROGRESS NOTES
Chief complaint:  Abdominal pain, right upper quadrant    HPI:  This patient is a 30 year old male seen via video visit who presents with episodic RUQ pain.  Denies much pain now.  Last severe episode was about 2 weeks ago.  U/S done last week.    Past Medical History:   has a past medical history of Concussion (age 14), Depressive disorder (Early 2000's), GERD (gastroesophageal reflux disease), H/O meningitis, Hypertension (January 2018?), and Varicella.    Past Surgical History:  Denies     Social History:  Social History     Socioeconomic History     Marital status:      Spouse name: Not on file     Number of children: Not on file     Years of education: Not on file     Highest education level: Not on file   Occupational History     Not on file   Social Needs     Financial resource strain: Not on file     Food insecurity     Worry: Not on file     Inability: Not on file     Transportation needs     Medical: Not on file     Non-medical: Not on file   Tobacco Use     Smoking status: Current Some Day Smoker     Packs/day: 1.00     Years: 10.00     Pack years: 10.00     Types: Other     Last attempt to quit: 1/18/2018     Years since quitting: 3.3     Smokeless tobacco: Former User     Tobacco comment: vape   Substance and Sexual Activity     Alcohol use: Not Currently     Alcohol/week: 2.0 standard drinks     Types: 2 Shots of liquor per week     Comment: 2 per day     Drug use: Yes     Types: Marijuana     Sexual activity: Yes     Partners: Female     Birth control/protection: Condom, Pill   Lifestyle     Physical activity     Days per week: Not on file     Minutes per session: Not on file     Stress: Not on file   Relationships     Social connections     Talks on phone: Not on file     Gets together: Not on file     Attends [employer] service: Not on file     Active member of club or organization: Not on file     Attends meetings of clubs or organizations: Not on file     Relationship status: Not on file      Intimate partner violence     Fear of current or ex partner: Not on file     Emotionally abused: Not on file     Physically abused: Not on file     Forced sexual activity: Not on file   Other Topics Concern     Parent/sibling w/ CABG, MI or angioplasty before 65F 55M? No   Social History Narrative     Not on file        Family History:  Family History   Problem Relation Age of Onset     Thyroid Disease Mother         Hashimoto's thyroiditis     Alcoholism Father      Substance Abuse Father         Alcohol     Uterine Cancer Maternal Grandmother      Other Cancer Maternal Grandfather         Skin cancer     Other - See Comments Paternal Grandmother         gun shot     Depression Sister      Anxiety Disorder Sister      Mental Illness Sister         Bi polar     Glaucoma No family hx of      Macular Degeneration No family hx of        Review of Systems:  The 10 point Review of Systems is negative other than noted in the HPI and above.    Physical Exam:  Limited by video visit  General - This is a well developed, well nourished male in no apparent distress.  Lungs - Breathing nonlabored   Skin shows no obvious jaundice.    Relevant labs:  Normal LFTs    Imaging:  Gallstones and sludge in gallbladder.  6mm gallbladder wall.    Assessment and Plan:  This is a patient with RUQ pain and gallstones, as well as wall thickening concerning for cholecystitis.  I have recommended Laparoscopic cholecystectomy.  Discussed procedure, risks and complications.  We will work on scheduling surgery at the patient s convenience.    A total of 30 minutes was spent today in chart review, Patient discussion and documentation.    Tato Rausch MD  [facility]    Please route or send letter to:  Primary Care Provider (PCP) and Referring Provider

## 2021-05-18 ENCOUNTER — TELEPHONE (OUTPATIENT)
Dept: SURGERY | Facility: CLINIC | Age: 31
End: 2021-05-18

## 2021-05-18 ENCOUNTER — HOSPITAL ENCOUNTER (OUTPATIENT)
Facility: CLINIC | Age: 31
End: 2021-05-18
Attending: SURGERY | Admitting: SURGERY
Payer: COMMERCIAL

## 2021-05-18 DIAGNOSIS — K81.9 CHOLECYSTITIS: ICD-10-CM

## 2021-05-18 NOTE — LETTER
[facility]    [address], [address]  [place], [place] [address]  Phone [phone]  Fax [phone] [address], [address]  [place] [address]  Phone [phone]  Fax [phone]    [email or web address]         Diogenes Cody    You have been scheduled for a COVID-19 testing appointment at [facility].    6-11-21 at 11:30 am     The clinic is located at:  [address] [place] [address]    Please wear a mask or face covering to the testing site.      Following your testing, you will be required to self-isolate until your surgery.  If you need a note for your employer due to this, please let us know.

## 2021-05-18 NOTE — LETTER
[facility]    [address] [facility] [facility] [address], [address]  [place], [place] [address]  Phone [phone]  Fax [phone] [address], [address]  [facility] [address]  Phone [phone]  Fax [phone]    [email or web address]   May 18, 2021    Diogenes Cody  [address] [facility] [address]    We realize with scheduling surgery, one of your first questions is, how much will this cost?  Below we have provided you with the information you will need to receive an estimate for your surgery.    You are scheduled for the following procedure:  Laparoscopic Cholecystectomy      Surgeon:  Dr. Rausch      Physician Assistant:  Yes      Please make sure to have your insurance card available at the time of calling.    Surgeon & Physician Assistant charges and facility charges for [facility], [facility] or [facility]:    Consumer Price Line at [phone]   -  It is important to note that there may be a Physician Assistant assisting with your surgery.  Please be sure to mention this when calling for the estimate.      If you prefer, you can also request a price estimate online by completing the secure form at:  [email or web address]    Facility Charges at [facility], [facility] or [facility]:  [facility] at [phone]  [facility] at [phone]  [facility] at [phone] or [phone]    Anesthesiologist Charges:  [facility] at [phone]    [facility] - Nurse Anesthetist Charges:  [facility] at [phone]

## 2021-05-18 NOTE — TELEPHONE ENCOUNTER
CANCELED   Type of surgery: LAPAROSCOPIC CHOLECYSTECTOMY   Location of surgery: [facility]  Date and time of surgery: 6-15-21, 7:30 AM   Surgeon: DR. CAIN   Pre-Op Appt Date: PATIENT TO SCHEDULE   Post-Op Appt Date: PATIENT TO SCHEDULE    Packet sent out: Yes  Pre-cert/Authorization completed:  Not Applicable  Date: 5-18-21      LAPAROSCOPIC CHOLECYSTECTOMY   GENERAL   PT INST TO HAVE H&P WITH DR. ESTEBAN  75 MINS REQ   PA ASSIST DFB   ALW

## 2021-05-24 DIAGNOSIS — Z11.59 ENCOUNTER FOR SCREENING FOR OTHER VIRAL DISEASES: ICD-10-CM

## 2021-06-01 ENCOUNTER — OFFICE VISIT (OUTPATIENT)
Dept: FAMILY MEDICINE | Facility: CLINIC | Age: 31
End: 2021-06-01
Payer: COMMERCIAL

## 2021-06-01 VITALS
DIASTOLIC BLOOD PRESSURE: 66 MMHG | OXYGEN SATURATION: 98 % | BODY MASS INDEX: 31.7 KG/M2 | TEMPERATURE: 98.3 F | WEIGHT: 214 LBS | HEIGHT: 69 IN | HEART RATE: 88 BPM | SYSTOLIC BLOOD PRESSURE: 102 MMHG | RESPIRATION RATE: 16 BRPM

## 2021-06-01 DIAGNOSIS — Z01.818 PREOP GENERAL PHYSICAL EXAM: Primary | ICD-10-CM

## 2021-06-01 DIAGNOSIS — K81.9 CHOLECYSTITIS: ICD-10-CM

## 2021-06-01 PROCEDURE — 99214 OFFICE O/P EST MOD 30 MIN: CPT | Performed by: FAMILY MEDICINE

## 2021-06-01 ASSESSMENT — MIFFLIN-ST. JEOR: SCORE: 1906.08

## 2021-06-01 NOTE — PATIENT INSTRUCTIONS

## 2021-06-01 NOTE — PROGRESS NOTES
[facility]  [id number] [place] [address]  Phone: [phone]  Primary Provider: Carol Corbin        PREOPERATIVE EVALUATION:  Today's date: 6/1/2021    Diogenes Cody is a 31 year old male who presents for a preoperative evaluation.    Surgical Information:  Surgery/Procedure: Gall bladder removal  Surgery Location: [facility] [place] [facility]    Surgeon: Tato Rausch MD  Surgery Date: 06/15/2021  Time of Surgery: 7.30  Where patient plans to recover: At home with family  Fax number for surgical facility: Note does not need to be faxed, will be available electronically in Epic.    Type of Anesthesia Anticipated: General    Assessment & Plan     The proposed surgical procedure is considered LOW risk.    Preop general physical exam    Cholecystitis        Risks and Recommendations:  The patient has the following additional risks and recommendations for perioperative complications:   - No identified additional risk factors other than previously addressed    Medication Instructions:  Patient is to take all scheduled medications on the day of surgery    RECOMMENDATION:  APPROVAL GIVEN to proceed with proposed procedure, without further diagnostic evaluation.              Subjective     HPI related to upcoming procedure: 32 y/o male with episodic RUQ pain. ultrasound confirms cholecystitis. Surgical intervention deemed the next best step in management.       Preop Questions 6/1/2021   1. Have you ever had a heart attack or stroke? No   2. Have you ever had surgery on your heart or blood vessels, such as a stent placement, a coronary artery bypass, or surgery on an artery in your head, neck, heart, or legs? No   3. Do you have chest pain with activity? No   4. Do you have a history of  heart failure? No   5. Do you currently have a cold, bronchitis or symptoms of other infection? No   6. Do you have a cough, shortness of breath, or wheezing? No   7. Do you  or anyone in your family have previous history of blood clots? No   8. Do you or does anyone in your family have a serious bleeding problem such as prolonged bleeding following surgeries or cuts? No   9. Have you ever had problems with anemia or been told to take iron pills? No   10. Have you had any abnormal blood loss such as black, tarry or bloody stools? No   11. Have you ever had a blood transfusion? No   12. Are you willing to have a blood transfusion if it is medically needed before, during, or after your surgery? Yes   13. Have you or any of your relatives ever had problems with anesthesia? No   14. Do you have sleep apnea, excessive snoring or daytime drowsiness? YES    14a. Do you have a CPAP machine? No   15. Do you have any artifical heart valves or other implanted medical devices like a pacemaker, defibrillator, or continuous glucose monitor? No   16. Do you have artificial joints? No   17. Are you allergic to latex? No     Health Care Directive:  Patient does not have a Health Care Directive or Living Will: NO     Preoperative Review of :   reviewed - no record of controlled substances prescribed.      Status of Chronic Conditions:  See problem list for active medical problems.  Problems all longstanding and stable, except as noted/documented.  See ROS for pertinent symptoms related to these conditions.      Review of Systems  Constitutional, neuro, ENT, endocrine, pulmonary, cardiac, gastrointestinal, genitourinary, musculoskeletal, integument and psychiatric systems are negative, except as otherwise noted.    Patient Active Problem List    Diagnosis Date Noted     Cholecystitis 05/18/2021     Priority: Medium     Added automatically from request for surgery [id number]       CARDIOVASCULAR SCREENING; LDL GOAL LESS THAN 160 04/24/2019     Priority: Medium     Elevated liver enzymes 02/27/2018     Priority: Medium     Obesity without serious comorbidity, unspecified classification, unspecified  obesity type 02/26/2018     Priority: Medium     Moderate episode of recurrent major depressive disorder (H) 01/27/2018     Priority: Medium     Elevated blood-pressure reading without diagnosis of hypertension 01/27/2018     Priority: Medium      Past Medical History:   Diagnosis Date     Concussion age 14     Depressive disorder Early 2000's     GERD (gastroesophageal reflux disease)      H/O meningitis      Hypertension January 2018?     Varicella      No past surgical history on file.  Current Outpatient Medications   Medication Sig Dispense Refill     buPROPion (WELLBUTRIN XL) 300 MG 24 hr tablet Take 1 tablet (300 mg) by mouth every morning 90 tablet 1     Cholecalciferol (VITAMIN D-3 PO)        fish oil-omega-3 fatty acids 1000 MG capsule        FLUoxetine (PROZAC) 20 MG capsule TAKE 3 CAPSULES BY MOUTH ONCE DAILY 270 capsule 1     MILK THISTLE PO Take 525 mg by mouth        OMEPRAZOLE PO          Allergies   Allergen Reactions     Amoxicillin      Ceclor [Cefaclor]      Sulfa Drugs      Sulfasalazine Hives     Vantin [Cefpodoxime] Hives        Social History     Tobacco Use     Smoking status: Current Some Day Smoker     Packs/day: 1.00     Years: 10.00     Pack years: 10.00     Types: Other     Last attempt to quit: 1/18/2018     Years since quitting: 3.3     Smokeless tobacco: Former User     Tobacco comment: vape   Substance Use Topics     Alcohol use: Not Currently     Alcohol/week: 2.0 standard drinks     Types: 2 Shots of liquor per week     Comment: 2 per day     Family History   Problem Relation Age of Onset     Thyroid Disease Mother         Hashimoto's thyroiditis     Alcoholism Father      Substance Abuse Father         Alcohol     Uterine Cancer Maternal Grandmother      Other Cancer Maternal Grandfather         Skin cancer     Other - See Comments Paternal Grandmother         gun shot     Depression Sister      Anxiety Disorder Sister      Mental Illness Sister         Bi polar     Glaucoma No  "family hx of      Macular Degeneration No family hx of      History   Drug Use     Types: Marijuana         Objective     /66 (BP Location: Right arm, Patient Position: Chair, Cuff Size: Adult Large)   Pulse 88   Temp 98.3  F (36.8  C) (Oral)   Resp 16   Ht 1.753 m (5' 9\")   Wt 97.1 kg (214 lb)   SpO2 98%   BMI 31.60 kg/m      Physical Exam    GENERAL APPEARANCE: healthy, alert and no distress     EYES: EOMI,  PERRL     HENT: ear canals and TM's normal and nose and mouth without ulcers or lesions     NECK: no adenopathy, no asymmetry, masses, or scars and thyroid normal to palpation     RESP: lungs clear to auscultation - no rales, rhonchi or wheezes     CV: regular rates and rhythm, normal S1 S2, no S3 or S4 and no murmur, click or rub     ABDOMEN: ,soft, bowel sounds normal and mild RUQ TTP     MS: extremities normal- no gross deformities noted, no evidence of inflammation in joints, FROM in all extremities.     SKIN: no suspicious lesions or rashes     NEURO: Normal strength and tone, sensory exam grossly normal, mentation intact and speech normal     PSYCH: mentation appears normal. and affect normal/bright     LYMPHATICS: No cervical adenopathy    Recent Labs   Lab Test 04/28/21  1441   HGB 16.0         POTASSIUM 4.1   CR 1.00        Diagnostics:  No labs were ordered during this visit.   No EKG required, no history of coronary heart disease, significant arrhythmia, peripheral arterial disease or other structural heart disease.    Revised Cardiac Risk Index (RCRI):  The patient has the following serious cardiovascular risks for perioperative complications:   - No serious cardiac risks = 0 points     RCRI Interpretation: 0 points: Class I (very low risk - 0.4% complication rate)           Signed Electronically by: Bernarda Bear MD  Copy of this evaluation report is provided to requesting physician.      "

## 2021-06-05 PROCEDURE — C9803 HOPD COVID-19 SPEC COLLECT: HCPCS

## 2021-06-05 PROCEDURE — 99285 EMERGENCY DEPT VISIT HI MDM: CPT | Mod: 25

## 2021-06-06 ENCOUNTER — APPOINTMENT (OUTPATIENT)
Dept: GENERAL RADIOLOGY | Facility: CLINIC | Age: 31
End: 2021-06-06
Attending: INTERNAL MEDICINE
Payer: COMMERCIAL

## 2021-06-06 ENCOUNTER — HOSPITAL ENCOUNTER (OUTPATIENT)
Facility: CLINIC | Age: 31
Setting detail: OBSERVATION
Discharge: HOME | End: 2021-06-06
Attending: EMERGENCY MEDICINE | Admitting: INTERNAL MEDICINE
Payer: COMMERCIAL

## 2021-06-06 ENCOUNTER — ANESTHESIA EVENT (OUTPATIENT)
Dept: SURGERY | Facility: CLINIC | Age: 31
End: 2021-06-06
Payer: COMMERCIAL

## 2021-06-06 ENCOUNTER — ANESTHESIA (OUTPATIENT)
Dept: SURGERY | Facility: CLINIC | Age: 31
End: 2021-06-06
Payer: COMMERCIAL

## 2021-06-06 VITALS
WEIGHT: 212 LBS | SYSTOLIC BLOOD PRESSURE: 116 MMHG | OXYGEN SATURATION: 92 % | BODY MASS INDEX: 31.4 KG/M2 | RESPIRATION RATE: 18 BRPM | DIASTOLIC BLOOD PRESSURE: 76 MMHG | HEIGHT: 69 IN | HEART RATE: 79 BPM | TEMPERATURE: 96.6 F

## 2021-06-06 DIAGNOSIS — K81.0 ACUTE CHOLECYSTITIS: ICD-10-CM

## 2021-06-06 LAB
ALBUMIN SERPL-MCNC: 3.9 G/DL (ref 3.4–5)
ALP SERPL-CCNC: 99 U/L (ref 40–150)
ALT SERPL W P-5'-P-CCNC: 52 U/L (ref 0–70)
ANION GAP SERPL CALCULATED.3IONS-SCNC: 3 MMOL/L (ref 3–14)
AST SERPL W P-5'-P-CCNC: 23 U/L (ref 0–45)
BASOPHILS # BLD AUTO: 0 10E9/L (ref 0–0.2)
BASOPHILS NFR BLD AUTO: 0.5 %
BILIRUB SERPL-MCNC: 1.2 MG/DL (ref 0.2–1.3)
BUN SERPL-MCNC: 21 MG/DL (ref 7–30)
CALCIUM SERPL-MCNC: 9.1 MG/DL (ref 8.5–10.1)
CHLORIDE SERPL-SCNC: 106 MMOL/L (ref 94–109)
CO2 SERPL-SCNC: 30 MMOL/L (ref 20–32)
CREAT SERPL-MCNC: 1.18 MG/DL (ref 0.66–1.25)
DIFFERENTIAL METHOD BLD: NORMAL
EOSINOPHIL # BLD AUTO: 0.1 10E9/L (ref 0–0.7)
EOSINOPHIL NFR BLD AUTO: 1.5 %
ERYTHROCYTE [DISTWIDTH] IN BLOOD BY AUTOMATED COUNT: 13.5 % (ref 10–15)
GFR SERPL CREATININE-BSD FRML MDRD: 81 ML/MIN/{1.73_M2}
GLUCOSE SERPL-MCNC: 93 MG/DL (ref 70–99)
HCT VFR BLD AUTO: 46.3 % (ref 40–53)
HGB BLD-MCNC: 15.3 G/DL (ref 13.3–17.7)
IMM GRANULOCYTES # BLD: 0.1 10E9/L (ref 0–0.4)
IMM GRANULOCYTES NFR BLD: 0.7 %
LABORATORY COMMENT REPORT: NORMAL
LIPASE SERPL-CCNC: 53 U/L (ref 73–393)
LYMPHOCYTES # BLD AUTO: 3 10E9/L (ref 0.8–5.3)
LYMPHOCYTES NFR BLD AUTO: 36.1 %
MCH RBC QN AUTO: 27.7 PG (ref 26.5–33)
MCHC RBC AUTO-ENTMCNC: 33 G/DL (ref 31.5–36.5)
MCV RBC AUTO: 84 FL (ref 78–100)
MONOCYTES # BLD AUTO: 0.7 10E9/L (ref 0–1.3)
MONOCYTES NFR BLD AUTO: 9 %
NEUTROPHILS # BLD AUTO: 4.3 10E9/L (ref 1.6–8.3)
NEUTROPHILS NFR BLD AUTO: 52.2 %
NRBC # BLD AUTO: 0 10*3/UL
NRBC BLD AUTO-RTO: 0 /100
PLATELET # BLD AUTO: 231 10E9/L (ref 150–450)
POTASSIUM SERPL-SCNC: 3.6 MMOL/L (ref 3.4–5.3)
PROT SERPL-MCNC: 7.1 G/DL (ref 6.8–8.8)
RBC # BLD AUTO: 5.52 10E12/L (ref 4.4–5.9)
SARS-COV-2 RNA RESP QL NAA+PROBE: NEGATIVE
SODIUM SERPL-SCNC: 139 MMOL/L (ref 133–144)
SPECIMEN SOURCE: NORMAL
WBC # BLD AUTO: 8.2 10E9/L (ref 4–11)

## 2021-06-06 PROCEDURE — G0378 HOSPITAL OBSERVATION PER HR: HCPCS

## 2021-06-06 PROCEDURE — 272N000001 HC OR GENERAL SUPPLY STERILE: Performed by: SURGERY

## 2021-06-06 PROCEDURE — 87635 SARS-COV-2 COVID-19 AMP PRB: CPT | Performed by: EMERGENCY MEDICINE

## 2021-06-06 PROCEDURE — 250N000011 HC RX IP 250 OP 636: Performed by: SURGERY

## 2021-06-06 PROCEDURE — 999N000180 XR SURGERY CARM FLUORO LESS THAN 5 MIN

## 2021-06-06 PROCEDURE — 710N000012 HC RECOVERY PHASE 2, PER MINUTE: Performed by: SURGERY

## 2021-06-06 PROCEDURE — 999N000141 HC STATISTIC PRE-PROCEDURE NURSING ASSESSMENT: Performed by: SURGERY

## 2021-06-06 PROCEDURE — 96374 THER/PROPH/DIAG INJ IV PUSH: CPT

## 2021-06-06 PROCEDURE — 99220 PR INITIAL OBSERVATION CARE,LEVEL III: CPT | Performed by: INTERNAL MEDICINE

## 2021-06-06 PROCEDURE — 250N000013 HC RX MED GY IP 250 OP 250 PS 637: Performed by: SURGERY

## 2021-06-06 PROCEDURE — 99207 PR APP CREDIT; MD BILLING SHARED VISIT: CPT | Performed by: PHYSICIAN ASSISTANT

## 2021-06-06 PROCEDURE — 250N000009 HC RX 250: Performed by: NURSE ANESTHETIST, CERTIFIED REGISTERED

## 2021-06-06 PROCEDURE — 83690 ASSAY OF LIPASE: CPT | Performed by: EMERGENCY MEDICINE

## 2021-06-06 PROCEDURE — 258N000003 HC RX IP 258 OP 636: Performed by: NURSE ANESTHETIST, CERTIFIED REGISTERED

## 2021-06-06 PROCEDURE — 47563 LAPARO CHOLECYSTECTOMY/GRAPH: CPT | Mod: AS | Performed by: PHYSICIAN ASSISTANT

## 2021-06-06 PROCEDURE — 258N000003 HC RX IP 258 OP 636: Performed by: INTERNAL MEDICINE

## 2021-06-06 PROCEDURE — 250N000011 HC RX IP 250 OP 636: Performed by: INTERNAL MEDICINE

## 2021-06-06 PROCEDURE — 250N000011 HC RX IP 250 OP 636: Performed by: NURSE ANESTHETIST, CERTIFIED REGISTERED

## 2021-06-06 PROCEDURE — 47563 LAPARO CHOLECYSTECTOMY/GRAPH: CPT | Performed by: SURGERY

## 2021-06-06 PROCEDURE — 250N000013 HC RX MED GY IP 250 OP 250 PS 637: Performed by: ANESTHESIOLOGY

## 2021-06-06 PROCEDURE — 99214 OFFICE O/P EST MOD 30 MIN: CPT | Mod: 57 | Performed by: SURGERY

## 2021-06-06 PROCEDURE — 88304 TISSUE EXAM BY PATHOLOGIST: CPT | Mod: TC | Performed by: SURGERY

## 2021-06-06 PROCEDURE — 710N000009 HC RECOVERY PHASE 1, LEVEL 1, PER MIN: Performed by: SURGERY

## 2021-06-06 PROCEDURE — 80053 COMPREHEN METABOLIC PANEL: CPT | Performed by: EMERGENCY MEDICINE

## 2021-06-06 PROCEDURE — 250N000009 HC RX 250: Performed by: SURGERY

## 2021-06-06 PROCEDURE — 88304 TISSUE EXAM BY PATHOLOGIST: CPT | Mod: 26 | Performed by: PATHOLOGY

## 2021-06-06 PROCEDURE — 370N000017 HC ANESTHESIA TECHNICAL FEE, PER MIN: Performed by: SURGERY

## 2021-06-06 PROCEDURE — 360N000083 HC SURGERY LEVEL 3 W/ FLUORO, PER MIN: Performed by: SURGERY

## 2021-06-06 PROCEDURE — 85025 COMPLETE CBC W/AUTO DIFF WBC: CPT | Performed by: EMERGENCY MEDICINE

## 2021-06-06 PROCEDURE — 250N000011 HC RX IP 250 OP 636: Performed by: ANESTHESIOLOGY

## 2021-06-06 RX ORDER — LABETALOL HYDROCHLORIDE 5 MG/ML
10 INJECTION, SOLUTION INTRAVENOUS
Status: DISCONTINUED | OUTPATIENT
Start: 2021-06-06 | End: 2021-06-06 | Stop reason: HOSPADM

## 2021-06-06 RX ORDER — ONDANSETRON 4 MG/1
4 TABLET, ORALLY DISINTEGRATING ORAL EVERY 30 MIN PRN
Status: DISCONTINUED | OUTPATIENT
Start: 2021-06-06 | End: 2021-06-06 | Stop reason: HOSPADM

## 2021-06-06 RX ORDER — CLINDAMYCIN PHOSPHATE 900 MG/50ML
900 INJECTION, SOLUTION INTRAVENOUS SEE ADMIN INSTRUCTIONS
Status: DISCONTINUED | OUTPATIENT
Start: 2021-06-06 | End: 2021-06-06 | Stop reason: HOSPADM

## 2021-06-06 RX ORDER — CLINDAMYCIN PHOSPHATE 900 MG/50ML
900 INJECTION, SOLUTION INTRAVENOUS
Status: COMPLETED | OUTPATIENT
Start: 2021-06-06 | End: 2021-06-06

## 2021-06-06 RX ORDER — ONDANSETRON 2 MG/ML
4 INJECTION INTRAMUSCULAR; INTRAVENOUS EVERY 30 MIN PRN
Status: DISCONTINUED | OUTPATIENT
Start: 2021-06-06 | End: 2021-06-06 | Stop reason: HOSPADM

## 2021-06-06 RX ORDER — PROCHLORPERAZINE MALEATE 5 MG
10 TABLET ORAL EVERY 6 HOURS PRN
Status: DISCONTINUED | OUTPATIENT
Start: 2021-06-06 | End: 2021-06-06 | Stop reason: HOSPADM

## 2021-06-06 RX ORDER — KETAMINE HYDROCHLORIDE 10 MG/ML
INJECTION INTRAMUSCULAR; INTRAVENOUS PRN
Status: DISCONTINUED | OUTPATIENT
Start: 2021-06-06 | End: 2021-06-06

## 2021-06-06 RX ORDER — ACETAMINOPHEN 325 MG/1
650 TABLET ORAL EVERY 4 HOURS PRN
Status: DISCONTINUED | OUTPATIENT
Start: 2021-06-06 | End: 2021-06-06 | Stop reason: HOSPADM

## 2021-06-06 RX ORDER — HYDROCODONE BITARTRATE AND ACETAMINOPHEN 5; 325 MG/1; MG/1
2 TABLET ORAL
Status: COMPLETED | OUTPATIENT
Start: 2021-06-06 | End: 2021-06-06

## 2021-06-06 RX ORDER — ONDANSETRON 2 MG/ML
4 INJECTION INTRAMUSCULAR; INTRAVENOUS EVERY 6 HOURS PRN
Status: DISCONTINUED | OUTPATIENT
Start: 2021-06-06 | End: 2021-06-06 | Stop reason: HOSPADM

## 2021-06-06 RX ORDER — HYDRALAZINE HYDROCHLORIDE 20 MG/ML
2.5-5 INJECTION INTRAMUSCULAR; INTRAVENOUS EVERY 10 MIN PRN
Status: DISCONTINUED | OUTPATIENT
Start: 2021-06-06 | End: 2021-06-06 | Stop reason: HOSPADM

## 2021-06-06 RX ORDER — HYDROCODONE BITARTRATE AND ACETAMINOPHEN 5; 325 MG/1; MG/1
1-2 TABLET ORAL EVERY 4 HOURS PRN
Qty: 20 TABLET | Refills: 0 | Status: SHIPPED | OUTPATIENT
Start: 2021-06-06 | End: 2023-07-19

## 2021-06-06 RX ORDER — LIDOCAINE HYDROCHLORIDE 10 MG/ML
INJECTION, SOLUTION INFILTRATION; PERINEURAL PRN
Status: DISCONTINUED | OUTPATIENT
Start: 2021-06-06 | End: 2021-06-06

## 2021-06-06 RX ORDER — NALOXONE HYDROCHLORIDE 0.4 MG/ML
0.4 INJECTION, SOLUTION INTRAMUSCULAR; INTRAVENOUS; SUBCUTANEOUS
Status: DISCONTINUED | OUTPATIENT
Start: 2021-06-06 | End: 2021-06-06 | Stop reason: HOSPADM

## 2021-06-06 RX ORDER — SODIUM CHLORIDE, SODIUM LACTATE, POTASSIUM CHLORIDE, CALCIUM CHLORIDE 600; 310; 30; 20 MG/100ML; MG/100ML; MG/100ML; MG/100ML
INJECTION, SOLUTION INTRAVENOUS CONTINUOUS
Status: DISCONTINUED | OUTPATIENT
Start: 2021-06-06 | End: 2021-06-06 | Stop reason: HOSPADM

## 2021-06-06 RX ORDER — ACETAMINOPHEN 325 MG/1
975 TABLET ORAL ONCE
Status: COMPLETED | OUTPATIENT
Start: 2021-06-06 | End: 2021-06-06

## 2021-06-06 RX ORDER — FENTANYL CITRATE 50 UG/ML
25-50 INJECTION, SOLUTION INTRAMUSCULAR; INTRAVENOUS
Status: DISCONTINUED | OUTPATIENT
Start: 2021-06-06 | End: 2021-06-06 | Stop reason: HOSPADM

## 2021-06-06 RX ORDER — HYDROMORPHONE HCL IN WATER/PF 6 MG/30 ML
0.2 PATIENT CONTROLLED ANALGESIA SYRINGE INTRAVENOUS
Status: DISCONTINUED | OUTPATIENT
Start: 2021-06-06 | End: 2021-06-06 | Stop reason: HOSPADM

## 2021-06-06 RX ORDER — NALOXONE HYDROCHLORIDE 0.4 MG/ML
0.2 INJECTION, SOLUTION INTRAMUSCULAR; INTRAVENOUS; SUBCUTANEOUS
Status: DISCONTINUED | OUTPATIENT
Start: 2021-06-06 | End: 2021-06-06 | Stop reason: HOSPADM

## 2021-06-06 RX ORDER — LABETALOL HYDROCHLORIDE 5 MG/ML
INJECTION, SOLUTION INTRAVENOUS PRN
Status: DISCONTINUED | OUTPATIENT
Start: 2021-06-06 | End: 2021-06-06

## 2021-06-06 RX ORDER — SODIUM CHLORIDE 9 MG/ML
INJECTION, SOLUTION INTRAVENOUS CONTINUOUS
Status: DISCONTINUED | OUTPATIENT
Start: 2021-06-06 | End: 2021-06-06 | Stop reason: HOSPADM

## 2021-06-06 RX ORDER — SODIUM CHLORIDE, SODIUM LACTATE, POTASSIUM CHLORIDE, CALCIUM CHLORIDE 600; 310; 30; 20 MG/100ML; MG/100ML; MG/100ML; MG/100ML
INJECTION, SOLUTION INTRAVENOUS CONTINUOUS PRN
Status: DISCONTINUED | OUTPATIENT
Start: 2021-06-06 | End: 2021-06-06

## 2021-06-06 RX ORDER — CELECOXIB 200 MG/1
400 CAPSULE ORAL ONCE
Status: COMPLETED | OUTPATIENT
Start: 2021-06-06 | End: 2021-06-06

## 2021-06-06 RX ORDER — NEOSTIGMINE METHYLSULFATE 1 MG/ML
VIAL (ML) INJECTION PRN
Status: DISCONTINUED | OUTPATIENT
Start: 2021-06-06 | End: 2021-06-06

## 2021-06-06 RX ORDER — GLYCOPYRROLATE 0.2 MG/ML
INJECTION, SOLUTION INTRAMUSCULAR; INTRAVENOUS PRN
Status: DISCONTINUED | OUTPATIENT
Start: 2021-06-06 | End: 2021-06-06

## 2021-06-06 RX ORDER — HYDROMORPHONE HYDROCHLORIDE 1 MG/ML
.3-.5 INJECTION, SOLUTION INTRAMUSCULAR; INTRAVENOUS; SUBCUTANEOUS EVERY 10 MIN PRN
Status: DISCONTINUED | OUTPATIENT
Start: 2021-06-06 | End: 2021-06-06 | Stop reason: HOSPADM

## 2021-06-06 RX ORDER — BUPIVACAINE HYDROCHLORIDE 2.5 MG/ML
INJECTION, SOLUTION EPIDURAL; INFILTRATION; INTRACAUDAL PRN
Status: DISCONTINUED | OUTPATIENT
Start: 2021-06-06 | End: 2021-06-06 | Stop reason: HOSPADM

## 2021-06-06 RX ORDER — PROCHLORPERAZINE 25 MG
25 SUPPOSITORY, RECTAL RECTAL EVERY 12 HOURS PRN
Status: DISCONTINUED | OUTPATIENT
Start: 2021-06-06 | End: 2021-06-06 | Stop reason: HOSPADM

## 2021-06-06 RX ORDER — DEXAMETHASONE SODIUM PHOSPHATE 4 MG/ML
INJECTION, SOLUTION INTRA-ARTICULAR; INTRALESIONAL; INTRAMUSCULAR; INTRAVENOUS; SOFT TISSUE PRN
Status: DISCONTINUED | OUTPATIENT
Start: 2021-06-06 | End: 2021-06-06

## 2021-06-06 RX ORDER — LIDOCAINE HYDROCHLORIDE 40 MG/ML
SOLUTION TOPICAL PRN
Status: DISCONTINUED | OUTPATIENT
Start: 2021-06-06 | End: 2021-06-06

## 2021-06-06 RX ORDER — PROPOFOL 10 MG/ML
INJECTION, EMULSION INTRAVENOUS PRN
Status: DISCONTINUED | OUTPATIENT
Start: 2021-06-06 | End: 2021-06-06

## 2021-06-06 RX ORDER — ACETAMINOPHEN 650 MG/1
650 SUPPOSITORY RECTAL EVERY 4 HOURS PRN
Status: DISCONTINUED | OUTPATIENT
Start: 2021-06-06 | End: 2021-06-06 | Stop reason: HOSPADM

## 2021-06-06 RX ORDER — AMOXICILLIN 250 MG
1-2 CAPSULE ORAL 2 TIMES DAILY
Qty: 30 TABLET | Refills: 0 | Status: SHIPPED | OUTPATIENT
Start: 2021-06-06 | End: 2023-07-19

## 2021-06-06 RX ORDER — ALBUTEROL SULFATE 0.83 MG/ML
2.5 SOLUTION RESPIRATORY (INHALATION) EVERY 4 HOURS PRN
Status: DISCONTINUED | OUTPATIENT
Start: 2021-06-06 | End: 2021-06-06 | Stop reason: HOSPADM

## 2021-06-06 RX ORDER — ONDANSETRON 4 MG/1
4 TABLET, ORALLY DISINTEGRATING ORAL EVERY 6 HOURS PRN
Status: DISCONTINUED | OUTPATIENT
Start: 2021-06-06 | End: 2021-06-06 | Stop reason: HOSPADM

## 2021-06-06 RX ORDER — MEPERIDINE HYDROCHLORIDE 25 MG/ML
12.5 INJECTION INTRAMUSCULAR; INTRAVENOUS; SUBCUTANEOUS
Status: DISCONTINUED | OUTPATIENT
Start: 2021-06-06 | End: 2021-06-06 | Stop reason: HOSPADM

## 2021-06-06 RX ORDER — ONDANSETRON 2 MG/ML
INJECTION INTRAMUSCULAR; INTRAVENOUS PRN
Status: DISCONTINUED | OUTPATIENT
Start: 2021-06-06 | End: 2021-06-06

## 2021-06-06 RX ADMIN — LIDOCAINE HYDROCHLORIDE 4 ML: 40 SOLUTION TOPICAL at 10:15

## 2021-06-06 RX ADMIN — CELECOXIB 400 MG: 200 CAPSULE ORAL at 09:29

## 2021-06-06 RX ADMIN — SODIUM CHLORIDE: 9 INJECTION, SOLUTION INTRAVENOUS at 08:44

## 2021-06-06 RX ADMIN — DEXAMETHASONE SODIUM PHOSPHATE 4 MG: 4 INJECTION, SOLUTION INTRA-ARTICULAR; INTRALESIONAL; INTRAMUSCULAR; INTRAVENOUS; SOFT TISSUE at 10:11

## 2021-06-06 RX ADMIN — HYDROMORPHONE HYDROCHLORIDE 0.5 MG: 1 INJECTION, SOLUTION INTRAMUSCULAR; INTRAVENOUS; SUBCUTANEOUS at 11:51

## 2021-06-06 RX ADMIN — PROPOFOL 150 MG: 10 INJECTION, EMULSION INTRAVENOUS at 10:11

## 2021-06-06 RX ADMIN — GLYCOPYRROLATE 0.4 MG: 0.2 INJECTION, SOLUTION INTRAMUSCULAR; INTRAVENOUS at 11:46

## 2021-06-06 RX ADMIN — ONDANSETRON HYDROCHLORIDE 4 MG: 2 INJECTION, SOLUTION INTRAVENOUS at 11:41

## 2021-06-06 RX ADMIN — FENTANYL CITRATE 50 MCG: 50 INJECTION, SOLUTION INTRAMUSCULAR; INTRAVENOUS at 10:26

## 2021-06-06 RX ADMIN — HYDROMORPHONE HYDROCHLORIDE 0.2 MG: 0.2 INJECTION, SOLUTION INTRAMUSCULAR; INTRAVENOUS; SUBCUTANEOUS at 08:48

## 2021-06-06 RX ADMIN — LABETALOL HYDROCHLORIDE 5 MG: 5 INJECTION, SOLUTION INTRAVENOUS at 10:53

## 2021-06-06 RX ADMIN — Medication 20 MG: at 10:32

## 2021-06-06 RX ADMIN — LIDOCAINE HYDROCHLORIDE 50 MG: 10 INJECTION, SOLUTION INFILTRATION; PERINEURAL at 10:11

## 2021-06-06 RX ADMIN — FENTANYL CITRATE 50 MCG: 50 INJECTION, SOLUTION INTRAMUSCULAR; INTRAVENOUS at 10:32

## 2021-06-06 RX ADMIN — LABETALOL HYDROCHLORIDE 5 MG: 5 INJECTION, SOLUTION INTRAVENOUS at 10:43

## 2021-06-06 RX ADMIN — NEOSTIGMINE METHYLSULFATE 3 MG: 1 INJECTION, SOLUTION INTRAVENOUS at 11:46

## 2021-06-06 RX ADMIN — FENTANYL CITRATE 100 MCG: 50 INJECTION, SOLUTION INTRAMUSCULAR; INTRAVENOUS at 10:11

## 2021-06-06 RX ADMIN — HYDROMORPHONE HYDROCHLORIDE 0.5 MG: 1 INJECTION, SOLUTION INTRAMUSCULAR; INTRAVENOUS; SUBCUTANEOUS at 10:49

## 2021-06-06 RX ADMIN — MIDAZOLAM 2 MG: 1 INJECTION INTRAMUSCULAR; INTRAVENOUS at 10:03

## 2021-06-06 RX ADMIN — ROCURONIUM BROMIDE 50 MG: 10 INJECTION INTRAVENOUS at 10:11

## 2021-06-06 RX ADMIN — SODIUM CHLORIDE, POTASSIUM CHLORIDE, SODIUM LACTATE AND CALCIUM CHLORIDE: 600; 310; 30; 20 INJECTION, SOLUTION INTRAVENOUS at 10:56

## 2021-06-06 RX ADMIN — HYDROCODONE BITARTRATE AND ACETAMINOPHEN 1 TABLET: 5; 325 TABLET ORAL at 12:42

## 2021-06-06 RX ADMIN — SODIUM CHLORIDE, POTASSIUM CHLORIDE, SODIUM LACTATE AND CALCIUM CHLORIDE: 600; 310; 30; 20 INJECTION, SOLUTION INTRAVENOUS at 09:45

## 2021-06-06 RX ADMIN — CLINDAMYCIN PHOSPHATE 900 MG: 900 INJECTION, SOLUTION INTRAVENOUS at 10:21

## 2021-06-06 RX ADMIN — ACETAMINOPHEN 975 MG: 325 TABLET, FILM COATED ORAL at 09:32

## 2021-06-06 ASSESSMENT — ENCOUNTER SYMPTOMS
ABDOMINAL PAIN: 1
VOMITING: 1
NAUSEA: 1

## 2021-06-06 ASSESSMENT — MIFFLIN-ST. JEOR: SCORE: 1897.01

## 2021-06-06 NOTE — ANESTHESIA PREPROCEDURE EVALUATION
Anesthesia Pre-Procedure Evaluation    Patient: Diogenes Cody   MRN: [id number] : 1990        Preoperative Diagnosis: Acute cholecystitis [K81.0]   Procedure : Procedure(s):  CHOLECYSTECTOMY, LAPAROSCOPIC possible intraoperative cholangiograms     Past Medical History:   Diagnosis Date     Concussion age 14     Depressive disorder Early 's     GERD (gastroesophageal reflux disease)      H/O meningitis      Hypertension 2018?     Varicella       History reviewed. No pertinent surgical history.   Allergies   Allergen Reactions     Amoxicillin      Ceclor [Cefaclor]      Sulfa Drugs      Sulfasalazine Hives     Vantin [Cefpodoxime] Hives      Social History     Tobacco Use     Smoking status: Current Some Day Smoker     Packs/day: 1.00     Years: 10.00     Pack years: 10.00     Types: Other     Last attempt to quit: 2018     Years since quitting: 3.3     Smokeless tobacco: Former User     Tobacco comment: vape   Substance Use Topics     Alcohol use: Not Currently     Alcohol/week: 2.0 standard drinks     Types: 2 Shots of liquor per week     Comment: 2 per day      Wt Readings from Last 1 Encounters:   21 96.2 kg (212 lb)        Anesthesia Evaluation   Pt has not had prior anesthetic         ROS/MED HX  ENT/Pulmonary:  - neg pulmonary ROS     Neurologic:  - neg neurologic ROS     Cardiovascular:     (+) hypertension----- (-) taking anticoagulants/antiplatelets   METS/Exercise Tolerance:     Hematologic:  - neg hematologic  ROS     Musculoskeletal:  - neg musculoskeletal ROS     GI/Hepatic:     (+) GERD, Asymptomatic on medication, cholecystitis/cholelithiasis,     Renal/Genitourinary:  - neg Renal ROS     Endo:     (+) Obesity,     Psychiatric/Substance Use:     (+) psychiatric history depression     Infectious Disease:       Malignancy:  - neg malignancy ROS     Other:            Physical Exam    Airway        Mallampati: II   TM distance: > 3 FB   Neck ROM: full   Mouth opening: > 3  cm    Respiratory Devices and Support         Dental  no notable dental history         Cardiovascular   cardiovascular exam normal          Pulmonary   pulmonary exam normal                OUTSIDE LABS:  CBC:   Lab Results   Component Value Date    WBC 8.2 06/06/2021    WBC 10.5 04/28/2021    HGB 15.3 06/06/2021    HGB 16.0 04/28/2021    HCT 46.3 06/06/2021    HCT 48.3 04/28/2021     06/06/2021     04/28/2021     BMP:   Lab Results   Component Value Date     06/06/2021     04/28/2021    POTASSIUM 3.6 06/06/2021    POTASSIUM 4.1 04/28/2021    CHLORIDE 106 06/06/2021    CHLORIDE 107 04/28/2021    CO2 30 06/06/2021    CO2 30 04/28/2021    BUN 21 06/06/2021    BUN 12 04/28/2021    CR 1.18 06/06/2021    CR 1.00 04/28/2021    GLC 93 06/06/2021    GLC 84 04/28/2021     COAGS: No results found for: PTT, INR, FIBR  POC: No results found for: BGM, HCG, HCGS  HEPATIC:   Lab Results   Component Value Date    ALBUMIN 3.9 06/06/2021    PROTTOTAL 7.1 06/06/2021    ALT 52 06/06/2021    AST 23 06/06/2021    ALKPHOS 99 06/06/2021    BILITOTAL 1.2 06/06/2021     OTHER:   Lab Results   Component Value Date    BRAD 9.1 06/06/2021    LIPASE 53 (L) 06/06/2021    TSH 0.97 02/26/2018       Anesthesia Plan    ASA Status:  2   NPO Status:  NPO Appropriate    Anesthesia Type: General.     - Airway: ETT   Induction: Intravenous.   Maintenance: Balanced.        Consents    Anesthesia Plan(s) and associated risks, benefits, and realistic alternatives discussed. Questions answered and patient/representative(s) expressed understanding.     - Discussed with:  Patient      - Extended Intubation/Ventilatory Support Discussed: No.      - Patient is DNR/DNI Status: No    Use of blood products discussed: No .     Postoperative Care    Pain management: IV analgesics, Oral pain medications, Multi-modal analgesia.   PONV prophylaxis: Ondansetron (or other 5HT-3), Dexamethasone or Solumedrol     Comments:                Grey DONNELLY  MD Hernandez

## 2021-06-06 NOTE — ANESTHESIA POSTPROCEDURE EVALUATION
Patient: Diogenes Cody    Procedure(s):  LAPAROSCOPIC CHOLECYSTECTOMY WITH INTRAOPERATIVE CHOLANGIOGRAM    Diagnosis:Acute cholecystitis [K81.0]  Diagnosis Additional Information: No value filed.    Anesthesia Type:  General    Note:  Disposition: Inpatient   Postop Pain Control: Uneventful            Sign Out: Well controlled pain   PONV: No   Neuro/Psych: Uneventful            Sign Out: Acceptable/Baseline neuro status   Airway/Respiratory: Uneventful            Sign Out: Acceptable/Baseline resp. status   CV/Hemodynamics: Uneventful            Sign Out: Acceptable CV status   Other NRE: NONE   DID A NON-ROUTINE EVENT OCCUR? No           Last vitals:  Vitals:    06/06/21 1210 06/06/21 1215 06/06/21 1220   BP: 116/63 115/66    Pulse: 57 64 78   Resp: 19 23 20   Temp:      SpO2: 99% 100% 100%       Last vitals prior to Anesthesia Care Transfer:  [facility] VITALS  6/6/2021 1129 - 6/6/2021 1224      6/6/2021             Pulse:  94    SpO2:  98 %    Resp Rate (observed):  9          Electronically Signed By: Grey Ng MD  June 6, 2021  12:24 PM

## 2021-06-06 NOTE — CONSULTS
"SURGICAL CONSULTATION   REQUESTING PROVIDER:  Dr Kaplan   HISTORY OF PRESENT ILLNESS:  I was asked by Dr. Kaplan  to see Diogenes Cody who is a 31 year old male with an elective lap marylou scheduled on the 15th with Dr Rausch.  He has had an escalating pattern of pain and an attack that has not resolved in 2 day.  It keeps him up at night.  No fever chills.  No history of jaundice or pancreatitis.  Prior abdominal surgery:  None.  PAST MEDICAL HISTORY:  has a past medical history of Concussion (age 14), Depressive disorder (Early 2000's), GERD (gastroesophageal reflux disease), H/O meningitis, Hypertension (January 2018?), and Varicella.  Smoking History:  Vapes, yes.  benefits of quitting  PAST SURGICAL HISTORY:  History reviewed. No pertinent surgical history.  MEDICATIONS: No current facility-administered medications on file prior to encounter.   buPROPion (WELLBUTRIN XL) 300 MG 24 hr tablet, Take 1 tablet (300 mg) by mouth every morning  FLUoxetine (PROZAC) 20 MG capsule, TAKE 3 CAPSULES BY MOUTH ONCE DAILY  OMEPRAZOLE PO,   Cholecalciferol (VITAMIN D-3 PO),   fish oil-omega-3 fatty acids 1000 MG capsule,   MILK THISTLE PO, Take 525 mg by mouth                                       FAMILY HISTORY:    Reviewed and non-contributory  ALLERGIES: This patient is allergic to is allergic to amoxicillin; ceclor [cefaclor]; sulfa drugs; sulfasalazine; and vantin [cefpodoxime].   REVIEW OF SYSTEMS: Negative except the HPI.   PHYSICAL EXAMINATION:   GENERAL: Pleasant, well-developed, well-nourished male, not ill-appearing.   /75 (BP Location: Left arm)   Pulse 68   Temp 95.8  F (35.4  C) (Oral)   Resp 16   Ht 1.753 m (5' 9\")   Wt 96.2 kg (212 lb)   SpO2 97%   BMI 31.31 kg/m    HEENT: Normocephalic, atraumatic. No scleral icterus.   NECK/LYMPH: Supple.  No adenopathy.   LUNGS: Respirations unlabored.   CARDIOVASCULAR: Regular rhythm and rate, no murmurs.  ABDOMEN:  Abdomen is flat. Tenderness, moderate and  " RUQ.  The gallbladder is non-palpable and tender. normal bowel sounds.  No hernia or scars noted.   EXTREMITIES: Without edema or deformity.  NEUROLOGIC: Alert and oriented, moves all extremities with good strength. Speech clear.   LABORATORY DATA: WBC-   WBC   Date Value Ref Range Status   06/06/2021 8.2 4.0 - 11.0 10e9/L Final   , HgB-   Hemoglobin   Date Value Ref Range Status   06/06/2021 15.3 13.3 - 17.7 g/dL Final     Liver function studies: Total Bilirubin 1.2, Alkaline Phosphatase 99, ALT 52, AST 23, Lipase 53.    IMAGING:  All imaging personally reviewed  All imaging studies reviewed by me.  Gallbladder ultrasound:   Common bile duct measures 7.4 mm  Findings consistent with acute cholecystitis  Gallstones seen  Gallbladder wall thickening       ASSESSMENT AND PLAN: Diogenes Cody  has early acute cholecystitis.  I am recommending him for laparoscopic cholecystectomy.  Intraoperative cholangiograms are not needed, unless to define anatomy due to normal LFTs.   I discussed the risks of the procedure including incisional related complications like hernia and infection as well as the small chance for post-cholecystectomy diarrhea, or the need to convert to an open cholecystectomy.  We also discussed rare complications such as bile leak, bowel or bile duct injury.  Diogenes is interested in proceeding with surgery in the next few hours.  EMMA BRADSHAW MD     Please route or send letter to:  Primary Care Provider (PCP), Referring Provider and Dr Logan Rausch    25 minutes total time spent on the date of this encounter doing:  Chart review, review of test results, patient visit, physical exam, education, counseling, developing plan of care, and documenting.

## 2021-06-06 NOTE — PLAN OF CARE
ROOM # 226    Living Situation (if not independent, order SW consult): live with family   Facility name:  : [facility]     Activity level at baseline: ind  Activity level on admit: ind       Patient registered to observation; given Patient Bill of Rights; given the opportunity to ask questions about observation status and their plan of care.  Patient has been oriented to the observation room, bathroom and call light is in place.    Discussed discharge goals and expectations with patient/family.

## 2021-06-06 NOTE — OP NOTE
SURGEON: Yesenia Dozier MD   ASSISTANT: Mai Hameed PA-C the physician assistant was medically necessary to assist in prepping, positioning, camera operation, retraction/exposure and closure of the port site.   PREOPERATIVE DIAGNOSIS: Acute Cholecystitis. and Chronic Cholecysititis   POSTOPERATIVE DIAGNOSIS:Acute Cholecystitis. and Chronic Cholecysititis  PROCEDURE: Laparoscopic cholecystectomy with intraoperative cholangiograms.  PREOPERATIVE MEDICATIONS: Clindamycin 900mg   INDICATION:  Diogenes Cody is a 31 year old male who has early acute cholecystitis..  A laparoscopic cholecystectomy is recommended.  The risks and benefits of the procedure, including the risk of bleeding, hernia, infection, possible need to perform an open cholecystectomy, bile leak or bile duct or bowel injury  have been discussed with the patient.  He agrees with proceeding.    PROCEDURE: The patient was placed supine. Abdomen prepped and draped in the usual sterile fashion.  Initial trocar site was in the infraumbilical location, and the abdomen was entered using Svetlana trocar technique. A pneumoperitoneum was established, and the camera was positioned within the peritoneal cavity. The remaining trocars were then placed under direct vision, two 5 mm at the right anterior axillary line, and a 5 mm at the subxiphoid. The gallbladder had dense omental adhesions surrounding the entire gallbladder and a rock hard stone filled gallbladder.  We had to bluntly and with cautery take down the many omental adhesions to even visualize the gallbladder.  We then needed the Redic screw to hold and maneuver the gallbladder by the neck portion.   Eventually, the gallbladder was grasped and placed under traction using the two lateral sites.  The dissection began using  the subxiphoid port. The cystic duct was freed circumferentially but we did get into the gallbladder at a site near the cystic duct and decided to perform cholangiograms in order to  confirm the ductal anatomy. The cystic duct was clipped near the gallbladder neck.  A cystic-ductotomy was made and the bile was clear.   The cholangiocath was advanced and secured with a clip.  Intra-operative cholangiograms showed a normal duct, no filling defects and prompt drainage into the duodenum.   The cholangiocath was then removed and the cystic duct was triply clipped and divided. The cystic artery was directly posterior and was triply clipped and divided, both anterior and posterior branches and several small branches along the bed. The gallbladder was removed from the gallbladder bed using coag cautery. Once the remaining attachments were divided, the gallbladder was removed thru the infraumbilical site using an endocatch bag. The camera was then repositioned and the bed inspected. The bed was dry and the clips in good position without leakage of blood or bile.   We did place [place] due to the oozing that occurred during the dissection, but appears to have stopped.  The  trocar sites were infiltrated with 0.25% Marcaine for pain control and removed under direct vision. The infraumbilical site was closed with interrupted 0 Vicryl for the fascia, and all skin sites closed with 4-0 subcuticular Monocryl. The patient transferred to recovery in good condition.   ESTIMATED BLOOD LOSS: 30 cc.   INTRAOPERATIVE FINDINGS: Acute Cholecystitis. and Chronic Cholecysititis                                                          Cholangiograms showed normal anatomy, no stones.

## 2021-06-06 NOTE — H&P
Admitted: 06/06/2021    CHIEF COMPLAINT:  Abdominal pain.    HISTORY OF PRESENT ILLNESS:  This is a 31-year-old gentleman with a history of depression who presents with right upper quadrant pain.  The pain has been worse for the past 3 days it has been coming and going.  However, for the past day, the patient has been pretty steady.  He denies any fevers or chills.  He denies any nausea or vomiting.  He had actually seen Dr. Rausch and was slated for a laparoscopic cholecystectomy in mid June; however, he could not take the pain today and hence he came into the ER.    PAST MEDICAL HISTORY:  Depression.    PAST SURGICAL HISTORY: Extensively reviewed and noncontributory.    SOCIAL HISTORY:  He does vape up on occasion.  He is .  He does not drink alcohol.  His medications include Wellbutrin rest of his medications awaiting reconciliation and Prozac.    FAMILY HISTORY:  Reviewed and noncontributory.    REVIEW OF SYSTEMS:  As mentioned in the HPI.  All other systems are reviewed and deemed unremarkable and negative.    PHYSICAL EXAMINATION:    VITAL SIGNS:  Temperature is 98.4, pulse 64, blood pressure is 113/72, respiratory rate 20, O2 sat is 94% on room air.  GENERAL:  He is alert, awake, oriented, coherent, in no acute distress.  HEENT:  Pupils equal, round, reactive to light.  LUNGS:  Clear to auscultation bilaterally.  HEART:  Regular rate.  S1, S2 normal.  No murmurs or gallops.  ABDOMEN:  Soft, tender in the right upper quadrant.  No guarding, no rigidity.  Good bowel sounds.  EXTREMITIES:  There is no edema.  HEENT:  There is no rash.    NEUROLOGICAL:  He moves all his extremities.  Lab work obtained to include a CMP which is grossly within normal limits.  His lipase was 53.  CBC with diff is grossly unremarkable.    IMPRESSION AND PLAN:   1.  Biliary colic.  We will admit him under observation status.  We will keep him n.p.o. with IV fluids.  He has been n.p.o. for at least 12 hours now.  We will consult  general surgery for laparoscopic cholecystectomy.  2.  Depression and anxiety.  His home meds can be resumed on discharge.    3.  CODE STATUS:  Full code.    4.  He will be admitted under observation status.    Kip Kaplan MD        D: 2021   T: 2021   MT: dariela    Name:     ELLA VILLASENOR  MRN:      [id number]        Account:     [id number]   :      1990           Admitted:    2021       Document: [id number]

## 2021-06-06 NOTE — ED NOTES
Pt returned to ED registration desk stating that he had been sleeping and had not heard the call for his room. Patient dismissal undone and patient placed in next available room.

## 2021-06-06 NOTE — DISCHARGE SUMMARY
Pt admitted earlier this morning for biliary colic and had outpatient surgery set up for later this month. The pain became intolerable so presented to the ED. General surgery was consulted and took the pt to the OR for lap cholecystectomy. The patient was recovered in PACU and discharged home.     Leia Wong PA-C

## 2021-06-06 NOTE — ED NOTES
[facility]  ED Nurse Handoff Report    Diogenes Cody is a 31 year old male   ED Chief complaint: Abdominal Pain  . ED Diagnosis:   Final diagnoses:   Acute cholecystitis     Allergies:   Allergies   Allergen Reactions     Amoxicillin      Ceclor [Cefaclor]      Sulfa Drugs      Sulfasalazine Hives     Vantin [Cefpodoxime] Hives       Code Status: Full Code  Activity level - Baseline/Home:  Independent. Activity Level - Current:   Independent. Lift room needed: No. Bariatric: No   Needed: No   Isolation: No. Infection: Not Applicable.     Vital Signs:   Vitals:    06/06/21 0430 06/06/21 0445 06/06/21 0500 06/06/21 0515   BP: 114/64 112/71 112/61 113/72   Pulse: 66 63 62 64   Resp:       Temp:       TempSrc:       SpO2: 95% 95% 94% 94%       Cardiac Rhythm:  ,      Pain level:    Patient confused: No. Patient Falls Risk: No.   Elimination Status: Has voided   Patient Report - Initial Complaint: Abdomen pain/Cherry surgery on 06/15/2021 scheduled. Focused Assessment: See Physician Note   Tests Performed: Labs, Covid. Abnormal Results: See Labs.   Treatments provided: Obs admit  Family Comments: Not present but made aware by patient  OBS brochure/video discussed/provided to patient:  Yes  ED Medications: Medications - No data to display  Drips infusing:  No  For the majority of the shift, the patient's behavior Green. Interventions performed were None.    Sepsis treatment initiated: No     Patient tested for COVID 19 prior to admission: YES    ED Nurse Name/Phone Number: Anne Valladares RN,   5:27 AM    RECEIVING UNIT ED HANDOFF REVIEW    Above ED Nurse Handoff Report was reviewed: Yes  Reviewed by: Stefan Diop RN on June 6, 2021 at 7:49 AM

## 2021-06-06 NOTE — ED TRIAGE NOTES
Pt states abdominal pain for 2 days pta. Pt states currently scheduled for cholecystectomy on 06/15 and believes pain is related to gallbladder. ABCs intact GCS 15

## 2021-06-06 NOTE — ANESTHESIA CARE TRANSFER NOTE
Patient: Diogenes Cody    Procedure(s):  LAPAROSCOPIC CHOLECYSTECTOMY WITH INTRAOPERATIVE CHOLANGIOGRAM    Diagnosis: Acute cholecystitis [K81.0]  Diagnosis Additional Information: No value filed.    Anesthesia Type:   General     Note:    Oropharynx: oropharynx clear of all foreign objects and spontaneously breathing  Level of Consciousness: awake  Oxygen Supplementation: face mask  Level of Supplemental Oxygen (L/min / FiO2): 6  Independent Airway: airway patency satisfactory and stable  Dentition: dentition unchanged  Vital Signs Stable: post-procedure vital signs reviewed and stable  Report to RN Given: handoff report given  Patient transferred to: PACU  Comments: No issues, denies pain  Handoff Report: Identifed the Patient, Identified the Reponsible Provider, Reviewed the pertinent medical history, Discussed the surgical course, Reviewed Intra-OP anesthesia mangement and issues during anesthesia and Allowed opportunity for questions and acknowledgement of understanding      Vitals: (Last set prior to Anesthesia Care Transfer)  CRNA VITALS  6/6/2021 1129 - 6/6/2021 1204      6/6/2021             Pulse:  94    SpO2:  98 %    Resp Rate (observed):  9        Electronically Signed By: YEYO Yuan CRNA  June 6, 2021  12:04 PM

## 2021-06-06 NOTE — DISCHARGE INSTRUCTIONS
Maximum acetaminophen (Tylenol) dose from all sources should not exceed 4 grams (4000 mg) per day.  You were given 975 mg of tylenol at 9:30 am    You received Celebrex, a nonsteroidal anti-inflammatory drug (NSAID) at  0930 am today.  Do not take any ibuprofen products until 0930 pm tonight.    You were given 1 Hydrocodone/acetaminophen at 1245 pm      HOME CARE FOLLOWING LAPAROSCOPIC CHOLECYSTECTOMY  SOFIA Bosch, MELODIE Dozier, BRYANT Jackson, TAQUERIA Marti, THERESA Clark    INCISIONAL CARE:  Replace the bandage over your incisions DAILY until all drainage stops, or if more comfortable to have in place.  If present, leave the steri-strips (white paper tapes) in place for 14 days after surgery.  If Dermabond (a type of skin glue) is present, leave in place until it wears/flakes off (2-3 weeks).     BATHING:  OK to shower 48 hours after surgery.  Avoid baths for 1 week after surgery.  You may wash your hair at any time.  Gently pat your incision dry after bathing.  Do not apply lotions, creams, or ointments to incisions.    ACTIVITY:  Light Activity -- you may immediately be up and about as tolerated.  Walking is encouraged, increase as tolerated.  Driving/Light Work-- when comfortable and off narcotic pain medications.  Strenuous Work/Activity -- limit lifting to 20 pounds for 2 weeks.  Progressively increase with time.  Active Sports (running, biking, etc.) -- cautiously resume after 2 weeks.    DISCOMFORT:  Local anesthetic placed at surgery should provide relief for 4-8 hours.  Begin taking pain pills before discomfort is severe.  Take the pain medication with some food, when possible, to minimize side effects.  Intermittent use of ice packs may help during the first 1-3 weeks after surgery.  Expect gradual improvement.    Over-the-counter anti-inflammatory medications (i.e. Ibuprofen/Advil/Motrin or Naprosyn/Aleve) may be used per package instructions in addition to or while tapering off the narcotic pain  medications to decrease swelling and sensitivity.  DO NOT TAKE these Anti-inflammatory medications if your primary physician has advised against doing so, or if you have acid reflux, ulcer, or bleeding disorder, or take blood-thinner medications.  Call your primary physician or the surgery office if you have medication questions.    After laparoscopic cholecystectomy, you may have shoulder or upper back discomfort due to the gas used during surgery.  This is temporary and should resolve within 2-3 days.  Frequent short walks may help with this.  You may have decreased energy level for 1-2 weeks after surgery related to your recovery.    DIET:  Start with liquids and gradually increase diet as tolerated.  Drink plenty of fluids.  While taking pain medications, consider use of a stool softener, increase your fiber in your diet, or add a fiber supplement (like Metamucil, Citrucel) to help prevent constipation - a possible side effect of pain medications.  It is not uncommon to experience some bowel changes (loose stools or constipation) after surgery.  Your body has to adapt to you no longer having a gall bladder.  To help minimize this side effect, avoid fatty foods for 1-2 weeks after surgery.  You may then slowly increase the amount of fatty foods in your diet.      NAUSEA:  If nauseated from the anesthetic/pain meds; rest in bed, get up cautiously with assistance, and drink clear liquids (juice, tea, broth).    FOLLOW-UP AFTER SURGERY:  -Our office will contact you approximately 2-3 weeks after surgery to check on your progress and answer any questions you may have.  If you are doing well, you will not need to return for an office appointment.  If any concerns are identified over the phone, we will help you make an appointment to see a provider.    -If you have not received a phone call, have any questions or concerns, or would like to be seen, please call us at [phone].  We are located at: [facility]  [facility], [address]; [place] [address]    -CONTACT US IF THE FOLLOWING DEVELOPS:   1. A fever that is above 101     2. Increased redness, warmth, drainage, bleeding, or swelling.   3. Pain that is not relieved by rest/ice and your prescription.   4.  Increasing pain after 48 hours.   5. Drainage that is thick, cloudy, yellow, green or white.   6. Any other questions or concerns.      FREQUENTLY ASKED QUESTIONS:    Q:  How should my incision look?    A:  Normally your incision will appear slightly swollen with light redness directly along the incision itself as it heals.  It may feel like a bump or ridge as the healing/scarring happens, and over time (3-4 months) this bump or ridge feeling should slowly go away.  In general, clear or pink watery drainage can be normal at first as your incision heals, but should decrease over time.    Q:  How do I know if my incision is infected?  A:  Look at your incision for signs of infection, like redness around the incision spreading to surrounding skin, or drainage of cloudy or foul-smelling drainage.  If you feel warm, check your temperature to see if you are running a fever.    **If any of these things occur, please notify the nurse at our office.  We may need you to come into the office for an incision check.      Q:  How do I take care of my incision?  A:  If you have a dressing in place - Starting the day after surgery, replace the dressing 1-2 times a day until there is no further drainage from the incision.  At that time, a dressing is no longer needed.  Try to minimize tape on the skin if irritation is occurring at the tape sites.  If you have significant irritation from tape on the skin, please call the office to discuss other method of dressing your incision.    Small pieces of tape called  steri-strips  may be present directly overlying your incision; these may be removed 10 days after surgery unless otherwise specified by your surgeon.  If these tapes start to loosen  at the ends, you may trim them back until they fall off or are removed.    A:  If you had  Dermabond  tissue glue used as a dressing (this causes your incision to look shiny with a clear covering over it) - This type of dressing wears off with time and does not require more dressings over the top unless it is draining around the glue as it wears off.  Do not apply ointments or lotions over the incisions until the glue has completely worn off.    Q:  There is a piece of tape or a sticky  lead  still on my skin.  Can I remove this?  A:  Sometimes the sticky  leads  used for monitoring during surgery or for evaluation in the emergency department are not all removed while you are in the hospital.  These sometimes have a tab or metal dot on them.  You can easily remove these on your own, like taking off a band-aid.  If there is a gel substance under the  lead , simply wipe/clean it off with a washcloth or paper towel.      Q:  What can I do to minimize constipation (very hard stools, or lack of stools)?  A:  Stay well hydrated.  Increase your dietary fiber intake or take a fiber supplement -with plenty of water.  Walk around frequently.  You may consider an over-the-counter stool-softener.  Your Pharmacist can assist you with choosing one that is stocked at your pharmacy.  Constipation is also one of the most common side effects of pain medication.  If you are using pain medication, be pro-active and try to PREVENT problems with constipation by taking the steps above BEFORE constipation becomes a problem.    Q:  What do I do if I need more pain medications?  A:  Call the office to receive refills.  Be aware that certain pain meds cannot be called into a pharmacy and actually require a paper prescription.  A change may be made in your pain med as you progress thru your recovery period or if you have side effects to certain meds.    --Pain meds are NOT refilled after 5pm on weekdays, and NOT AT ALL on the weekends, so  please look ahead to prevent problems.      Q:  Why am I having a hard time sleeping now that I am at home?  A:  Many medications you receive while you are in the hospital can impact your sleep for a number of days after your surgery/hospitalization.  Decreased level of activity and naps during the day may also make sleeping at night difficult.  Try to minimize day-time naps, and get up frequently during the day to walk around your home during your recovery time.  Sleep aides may be of some help, but are not recommended for long-term use.      Q:  I am having some back discomfort.  What should I do?  A:  This may be related to certain positioning that was required for your surgery, extended periods of time in bed, or other changes in your overall activity level.  You may try ice, heat, acetaminophen, or ibuprofen to treat this temporarily.  Note that many pain medications have acetaminophen in them and would state this on the prescription bottle.  Be sure not to exceed the maximum of 4000mg per day of acetaminophen.     **If the pain you are having does not resolve, is severe, or is a flare of back pain you have had on other occasions prior to surgery, please contact your primary physician for further recommendations or for an appointment to be examined at their office.    Q:  Why am I having headaches?  A:  Headaches can be caused by many things:  caffeine withdrawal, use of pain meds, dehydration, high blood pressure, lack of sleep, over-activity/exhaustion, flare-up of usual migraine headaches.  If you feel this is related to muscle tension (a band-like feeling around the head, or a pressure at the low-back of the head) you may try ice or heat to this area.  You may need to drink more fluids (try electrolyte drink like Gatorade), rest, or take your usual migraine medications.   **If your headaches do not resolve, worsen, are accompanied by other symptoms, or if your blood pressure is high, please call your  primary physician for recommendation and/or examination.    Q:  I am unable to urinate.  What do I do?  A:  A small percentage of people can have difficulty urinating initially after surgery.  This includes being able to urinate only a very small amount at a time and feeling discomfort or pressure in the very low abdomen.  This is called  urinary retention , and is actually an urgent situation.  Proceed to your nearest Emergency department for evaluation (not an Urgent Care Center).  Sometimes the bladder does not work correctly after certain medications you receive during surgery, or related to certain procedures.  You may need to have a catheter placed until your bladder recovers.  When planning to go to an Emergency department, it may help to call the ER to let them know you are coming in for this problem after a surgery.  This may help you get in quicker to be evaluated.  **If you have symptoms of a urinary tract infection, please contact your primary physician for the proper evaluation and treatment.          If you have other questions, please call the office Monday thru Friday between 8am and 5pm to discuss with the nurse or physician assistant.  #[phone]    There is a surgeon ON CALL on weekday evenings and over the weekend in case of urgent need only, and may be contacted at the same number.    If you are having an emergency, call 911 or proceed to your nearest emergency department.    GENERAL ANESTHESIA OR SEDATION ADULT DISCHARGE INSTRUCTIONS   SPECIAL PRECAUTIONS FOR 24 HOURS AFTER SURGERY    IT IS NOT UNUSUAL TO FEEL LIGHT-HEADED OR FAINT, UP TO 24 HOURS AFTER SURGERY OR WHILE TAKING PAIN MEDICATION.  IF YOU HAVE THESE SYMPTOMS; SIT FOR A FEW MINUTES BEFORE STANDING AND HAVE SOMEONE ASSIST YOU WHEN YOU GET UP TO WALK OR USE THE BATHROOM.    YOU SHOULD REST AND RELAX FOR THE NEXT 24 HOURS AND YOU MUST MAKE ARRANGEMENTS TO HAVE SOMEONE STAY WITH YOU FOR AT LEAST 24 HOURS AFTER YOUR DISCHARGE.   AVOID HAZARDOUS AND STRENUOUS ACTIVITIES.  DO NOT MAKE IMPORTANT DECISIONS FOR 24 HOURS.    DO NOT DRIVE ANY VEHICLE OR OPERATE MECHANICAL EQUIPMENT FOR 24 HOURS FOLLOWING THE END OF YOUR SURGERY.  EVEN THOUGH YOU MAY FEEL NORMAL, YOUR REACTIONS MAY BE AFFECTED BY THE MEDICATION YOU HAVE RECEIVED.    DO NOT DRINK ALCOHOLIC BEVERAGES FOR 24 HOURS FOLLOWING YOUR SURGERY.    DRINK CLEAR LIQUIDS (APPLE JUICE, GINGER ALE, 7-UP, BROTH, ETC.).  PROGRESS TO YOUR REGULAR DIET AS YOU FEEL ABLE.    YOU MAY HAVE A DRY MOUTH, A SORE THROAT, MUSCLES ACHES OR TROUBLE SLEEPING.  THESE SHOULD GO AWAY AFTER 24 HOURS.    CALL YOUR DOCTOR FOR ANY OF THE FOLLOWING:  SIGNS OF INFECTION (FEVER, GROWING TENDERNESS AT THE SURGERY SITE, A LARGE AMOUNT OF DRAINAGE OR BLEEDING, SEVERE PAIN, FOUL-SMELLING DRAINAGE, REDNESS OR SWELLING.    IT HAS BEEN OVER 8 TO 10 HOURS SINCE SURGERY AND YOU ARE STILL NOT ABLE TO URINATE (PASS WATER).

## 2021-06-07 ENCOUNTER — TELEPHONE (OUTPATIENT)
Dept: FAMILY MEDICINE | Facility: CLINIC | Age: 31
End: 2021-06-07

## 2021-06-07 NOTE — TELEPHONE ENCOUNTER
"Patient returned call:     ED/Discharge Protocol    \"Hi, my name is TIMOTHY WINCHESTER RN, a registered nurse, and I am calling on behalf of Ning Corbin's office at [place].  I am calling to follow up and see how things are going for you after your recent visit.\"    \"I see that you were in the (ER/UC/IP) on 6/6.    How are you doing now that you are home?\" feeling pretty good, pain is tolerable 4/10, hydrocodone and tylenol and ibuprofen    Is patient experiencing symptoms that may require a hospital visit?  no    Discharge Instructions    \"Let's review your discharge instructions.  What is/are the follow-up recommendations?  Pt. Response: not sure - AVS states to follow-up with PCP within 7 days    \"Were you instructed to make a follow-up appointment?\"  Pt. Response: Not sure  - appointment scheduled   Next 5 appointments (look out 90 days)    Jun 11, 2021 11:00 AM  SHORT with YEYO Mendoza CNP  [facility] ([facility] ) [address] [place] [id number] [phone]         \"When you see the provider, I would recommend that you bring your discharge instructions with you.    Medications    \"How many new medications are you on since your hospitalization/ED visit?\"    2 or more - hydrocodone, senakot  \"How many of your current medicines changed (dose, timing, name, etc.) while you were in the hospital/ED visit?\"   0   \"Do you have questions about your medications?\"   No  \"Were you newly diagnosed with heart failure, COPD, diabetes or did you have a heart attack?\"   No  For patients on insulin: \"Did you start on insulin in the hospital or did you have your insulin dose changed?\"   No  Post Discharge Medication Reconciliation Status: discharge medications reconciled and changed, per note/orders.  Was MTM referral placed (*Make sure to put transitions as reason for referral)?   No    Call Summary    \"Do you have any questions or concerns about " "your condition or care plan at the moment?\"    No  Triage nurse advice given: call back if worsening pain, chest pain, or sob    Patient was in ER 1 in the past year (assess appropriateness of ER visits.)      \"If you have questions or things don't continue to improve, we encourage you contact us through the main clinic number,  [phone].  Even if the clinic is not open, triage nurses are available 24/7 to help you.     We would like you to know that our clinic has extended hours (provide information).  We also have urgent care (provide details on closest location and hours/contact info)\"      \"Thank you for your time and take care!\"        "

## 2021-06-08 LAB — COPATH REPORT: NORMAL

## 2021-06-08 NOTE — RESULT ENCOUNTER NOTE
These results are as expected and will be discussed at the follow up visit or call.  Yesenia Dozier MD

## 2021-06-11 ENCOUNTER — OFFICE VISIT (OUTPATIENT)
Dept: FAMILY MEDICINE | Facility: CLINIC | Age: 31
End: 2021-06-11
Payer: COMMERCIAL

## 2021-06-11 VITALS
OXYGEN SATURATION: 97 % | RESPIRATION RATE: 20 BRPM | SYSTOLIC BLOOD PRESSURE: 104 MMHG | TEMPERATURE: 98.5 F | BODY MASS INDEX: 31.75 KG/M2 | WEIGHT: 215 LBS | DIASTOLIC BLOOD PRESSURE: 70 MMHG | HEART RATE: 88 BPM

## 2021-06-11 DIAGNOSIS — K81.0 ACUTE CHOLECYSTITIS: Primary | ICD-10-CM

## 2021-06-11 DIAGNOSIS — F33.1 MODERATE EPISODE OF RECURRENT MAJOR DEPRESSIVE DISORDER (H): ICD-10-CM

## 2021-06-11 PROCEDURE — 99214 OFFICE O/P EST MOD 30 MIN: CPT | Performed by: NURSE PRACTITIONER

## 2021-06-11 PROCEDURE — 96127 BRIEF EMOTIONAL/BEHAV ASSMT: CPT | Performed by: NURSE PRACTITIONER

## 2021-06-11 RX ORDER — BUPROPION HYDROCHLORIDE 300 MG/1
300 TABLET ORAL EVERY MORNING
Qty: 90 TABLET | Refills: 1 | Status: SHIPPED | OUTPATIENT
Start: 2021-06-11 | End: 2022-12-06

## 2021-06-11 ASSESSMENT — ANXIETY QUESTIONNAIRES
1. FEELING NERVOUS, ANXIOUS, OR ON EDGE: SEVERAL DAYS
3. WORRYING TOO MUCH ABOUT DIFFERENT THINGS: SEVERAL DAYS
7. FEELING AFRAID AS IF SOMETHING AWFUL MIGHT HAPPEN: NOT AT ALL
IF YOU CHECKED OFF ANY PROBLEMS ON THIS QUESTIONNAIRE, HOW DIFFICULT HAVE THESE PROBLEMS MADE IT FOR YOU TO DO YOUR WORK, TAKE CARE OF THINGS AT HOME, OR GET ALONG WITH OTHER PEOPLE: SOMEWHAT DIFFICULT
GAD7 TOTAL SCORE: 4
6. BECOMING EASILY ANNOYED OR IRRITABLE: SEVERAL DAYS
2. NOT BEING ABLE TO STOP OR CONTROL WORRYING: SEVERAL DAYS
5. BEING SO RESTLESS THAT IT IS HARD TO SIT STILL: NOT AT ALL

## 2021-06-11 ASSESSMENT — PATIENT HEALTH QUESTIONNAIRE - PHQ9
SUM OF ALL RESPONSES TO PHQ QUESTIONS 1-9: 4
5. POOR APPETITE OR OVEREATING: NOT AT ALL

## 2021-06-11 ASSESSMENT — PAIN SCALES - GENERAL: PAINLEVEL: SEVERE PAIN (6)

## 2021-06-11 NOTE — PROGRESS NOTES
Assessment & Plan     Moderate episode of recurrent major depressive disorder (H)  Stable, continue current medications.   - buPROPion (WELLBUTRIN XL) 300 MG 24 hr tablet; Take 1 tablet (300 mg) by mouth every morning  - FLUoxetine (PROZAC) 20 MG capsule; TAKE 3 CAPSULES BY MOUTH ONCE DAILY    Acute cholecystitis  S/p cholecystectomy.  Doing well.  Work on reducing narcotic pain medication and transition to tylenol/ibuprofen.                Return in about 6 months (around 12/11/2021) for mood/anxiety, Video Visit.    [facility] BENTLEY Saini is a 31 year old who presents for the following health issues     HPI       Hospital Follow-up Visit:    Hospital/Nursing Home/ Rehab Facility: [facility]  Date of Admission: 06/06/21  Date of Discharge: 06/06/21  Reason(s) for Admission: Biliary colic, had Lap Cholecystectomy       Was your hospitalization related to COVID-19? No   Problems taking medications regularly:  None  Medication changes since discharge: None  Problems adhering to non-medication therapy:  None    Summary of hospitalization:  Discharge summary unavailable  Diagnostic Tests/Treatments reviewed.  Follow up needed: none  Other Healthcare Providers Involved in Patient s Care:         None  Update since discharge: improved.       Post Discharge Medication Reconciliation: discharge medications reconciled, continue medications without change.  Plan of care communicated with patient            Feeling much better since the surgery.  Has been up and doing more activity, so has felt a little more discomfort the last couple days.  Overall pain controlled.  He has been using 2-3 hydrocodone a day.  Has only used one tab today.  Taking tylenol in addition to norco.        Depression Followup    How are you doing with your depression since your last visit? No change    Are you having other symptoms that might be associated with depression?  No    Have you had a significant life event?  OTHER: gallbladder surgery; took a promotion at work      Are you feeling anxious or having panic attacks?   No    Do you have any concerns with your use of alcohol or other drugs? No  Feels mood is well controlled.  Due for refills.   Social History     Tobacco Use     Smoking status: Current Some Day Smoker     Packs/day: 1.00     Years: 10.00     Pack years: 10.00     Types: Other     Last attempt to quit: 1/18/2018     Years since quitting: 3.3     Smokeless tobacco: Former User     Tobacco comment: vape   Substance Use Topics     Alcohol use: Not Currently     Alcohol/week: 2.0 standard drinks     Comment: 2 per day     Drug use: Yes     Types: Marijuana     PHQ 1/29/2021 4/19/2021 6/11/2021   PHQ-9 Total Score 9 7 4   Q9: Thoughts of better off dead/self-harm past 2 weeks Not at all Not at all Not at all     RADHA-7 SCORE 6/17/2020 4/19/2021 6/11/2021   Total Score - 5 (mild anxiety) -   Total Score 4 5 4       Mood is well controlled.  Due for refills today.     Review of Systems   Constitutional, HEENT, cardiovascular, pulmonary, gi and gu and psych systems are negative, except as otherwise noted.      Objective    /70 (BP Location: Right arm, Patient Position: Sitting, Cuff Size: Adult Regular)   Pulse 88   Temp 98.5  F (36.9  C) (Oral)   Resp 20   Wt 97.5 kg (215 lb)   SpO2 97%   BMI 31.75 kg/m    Body mass index is 31.75 kg/m .  Physical Exam   GENERAL: healthy, alert and no distress  RESP: lungs clear to auscultation - no rales, rhonchi or wheezes  CV: regular rate and rhythm, normal S1 S2, no S3 or S4, no murmur, click or rub, no peripheral edema and peripheral pulses strong  ABDOMEN: soft, nontender, no hepatosplenomegaly, no masses and bowel sounds normal, 5 surgical incisions on abdomen 4/5 covered with steri strips that are C/D/I, mild bruising around incisions  MS: no gross musculoskeletal defects noted, no edema  SKIN: no suspicious lesions  or rashes  PSYCH: mentation appears normal, affect normal/bright    No results found for this or any previous visit (from the past 24 hour(s)).

## 2021-06-12 ASSESSMENT — ANXIETY QUESTIONNAIRES: GAD7 TOTAL SCORE: 4

## 2021-06-25 ENCOUNTER — MYC MEDICAL ADVICE (OUTPATIENT)
Dept: SURGERY | Facility: CLINIC | Age: 31
End: 2021-06-25

## 2021-06-25 NOTE — TELEPHONE ENCOUNTER
Attempted to call patient for post op check.  No answer.  Message was left for patient to call back if they had any questions of concerns. [employer] message was sent as well.    Mai Hameed PA-C

## 2021-09-19 ENCOUNTER — NURSE TRIAGE (OUTPATIENT)
Dept: NURSING | Facility: CLINIC | Age: 31
End: 2021-09-19

## 2021-09-19 NOTE — TELEPHONE ENCOUNTER
Yesterday he was sick with diarrhea and throwing up, sore throat, headache, cough . He says he has been very confused the past 24 hours and making a lot of mistakes at work.  I told him the care advice for that is to call 911 for the ambulance to come and get you and go to the ED to be evaluated for lack of oxygen.  He is going to have his wife drive him in.  I told him at any time if his symptoms change or get worse during transit, to pull over and call 911.    Patient verbalized understanding and agrees with plan.    Cherie Paz Nurse Triage Advisor 12:20 PM 9/19/2021    Reason for Disposition    Difficult to awaken or acting confused (e.g., disoriented, slurred speech)    Additional Information    Negative: SEVERE difficulty breathing (e.g., struggling for each breath, speaks in single words)    Protocols used: CORONAVIRUS (COVID-19) DIAGNOSED OR VHRDWXWBB-S-QC 3.25

## 2021-09-23 ENCOUNTER — APPOINTMENT (OUTPATIENT)
Dept: CT IMAGING | Facility: CLINIC | Age: 31
End: 2021-09-23
Attending: PHYSICIAN ASSISTANT
Payer: OTHER MISCELLANEOUS

## 2021-09-23 ENCOUNTER — HOSPITAL ENCOUNTER (EMERGENCY)
Facility: CLINIC | Age: 31
Discharge: HOME | End: 2021-09-23
Attending: PHYSICIAN ASSISTANT | Admitting: PHYSICIAN ASSISTANT
Payer: OTHER MISCELLANEOUS

## 2021-09-23 ENCOUNTER — APPOINTMENT (OUTPATIENT)
Dept: GENERAL RADIOLOGY | Facility: CLINIC | Age: 31
End: 2021-09-23
Attending: PHYSICIAN ASSISTANT
Payer: OTHER MISCELLANEOUS

## 2021-09-23 VITALS
HEART RATE: 77 BPM | RESPIRATION RATE: 18 BRPM | TEMPERATURE: 98.7 F | DIASTOLIC BLOOD PRESSURE: 64 MMHG | SYSTOLIC BLOOD PRESSURE: 117 MMHG | OXYGEN SATURATION: 97 %

## 2021-09-23 DIAGNOSIS — S93.402A SPRAIN OF LEFT ANKLE, UNSPECIFIED LIGAMENT, INITIAL ENCOUNTER: ICD-10-CM

## 2021-09-23 PROCEDURE — 73700 CT LOWER EXTREMITY W/O DYE: CPT | Mod: LT

## 2021-09-23 PROCEDURE — 73610 X-RAY EXAM OF ANKLE: CPT | Mod: LT

## 2021-09-23 PROCEDURE — 250N000013 HC RX MED GY IP 250 OP 250 PS 637: Performed by: PHYSICIAN ASSISTANT

## 2021-09-23 PROCEDURE — 99284 EMERGENCY DEPT VISIT MOD MDM: CPT | Mod: 25

## 2021-09-23 RX ORDER — IBUPROFEN 600 MG/1
600 TABLET, FILM COATED ORAL ONCE
Status: COMPLETED | OUTPATIENT
Start: 2021-09-23 | End: 2021-09-23

## 2021-09-23 RX ORDER — ACETAMINOPHEN 500 MG
1000 TABLET ORAL EVERY 4 HOURS PRN
Status: DISCONTINUED | OUTPATIENT
Start: 2021-09-23 | End: 2021-09-24 | Stop reason: HOSPADM

## 2021-09-23 RX ADMIN — IBUPROFEN 600 MG: 600 TABLET ORAL at 21:18

## 2021-09-23 RX ADMIN — ACETAMINOPHEN 1000 MG: 500 TABLET, FILM COATED ORAL at 21:18

## 2021-09-23 ASSESSMENT — ENCOUNTER SYMPTOMS: ARTHRALGIAS: 1

## 2021-09-23 NOTE — ED PROVIDER NOTES
History     Chief Complaint:  Ankle Pain       HPI  Diogenes Cody is a 31 year old male who presents for evaluation of left ankle pain. The patient reports he stepped out of his van at work and rolled his left ankle and heard it pop 5 times. Patient last took ibuprofen at 1600. Denies numbness/tingling    Review of Systems   Musculoskeletal: Positive for arthralgias.   Neurological: Negative for numbness.   All other systems reviewed and are negative.      Allergies:  Amoxicillin  Cefaclor  Sulfa Drugs  Sulfasalazine  Vantin      Medications:  Wellbutrin XL   Prozac   Omeprazole      Past Medical History:    Depression   GERD   Hypertension   Varicella   Meningitis   Cholecystitis       Past Surgical History:    Adenoidectomy  Tympanostomy tube placement       Family History:    Hashimoto's thyroiditis - Mother   Alcohol abuse - Father   Depression - Sister  Anxiety - Sister   Bipolar disorder - Sister     Social History:  The patient was unaccompanied to the ED.  PCP: Carol Corbin    Physical Exam     Patient Vitals for the past 24 hrs:   BP Temp Temp src Pulse Resp SpO2   09/23/21 2118 -- -- -- -- -- 97 %   09/23/21 2114 117/64 -- -- 77 -- --   09/23/21 1827 137/87 -- -- -- -- --   09/23/21 1825 -- 98.7  F (37.1  C) Temporal 99 18 99 %      Physical Exam  Constitutional: alert, cooperative  CV: 2+ DP and PT pulses, brisk distal cap refill  Pulm: No respiratory distress  MSK: Diffuse swelling to the left lateral ankle with associated tenderness.  Remainder of ankle and foot nontender. No tenderness to the midfoot, calcaneus, base of 5th metatarsal or proximal tib/fib.  Neurological: Alert, attentive  Able to wiggle all 5 toes and sensation intact left foot.  Skin: Skin is warm and dry.   Psych: Normal mood and affect     Emergency Department Course   Imaging:  XR Ankle Left G/E 3 Views  Left ankle negative for fracture or dislocation. Moderate soft tissue swelling in the lateral aspect of the ankle.  Moderate ankle joint effusion.  Reading per radiology     CT Ankle Left w/o Contrast  1.  Negative for fracture.  2.  Small effusion in the ankle joint and posterior subtalar joint.  3.  Soft tissue swelling. No hematoma, cyst or mass.  4.  Incidentally noted accessory muscle causing some crowding in the tarsal tunnel.  Reading per radiology     Emergency Department Course:  Reviewed:  1830 I reviewed nursing notes, vitals, past medical history, and care everywhere.    Assessments:  1900 I obtained history and examined the patient as noted above.     2155 I rechecked the patient and explained findings.     Interventions:  2118 Tylenol 1000 mg PO  2118 ibuprofen 600 mg PO    Disposition:  The patient was discharged to home.   Impression & Plan   CMS Diagnoses: None    Medical Decision Making:  Diogenes Cody is a 31 year old male who presents for evaluation of ankle pain.  Xrays and CT were obtained and there are no signs of fracture.  The patient's neurovascular status is normal. There is nothing else on history or PE to suggest other trauma.  Plan is for protected weightbearing with an air stirrup splint and crutches, RICE treatment with ice 20 minutes/2 hours off, and ibuprofen.  Patient will advance weightbearing and follow-up with primary or ortho in 2-3 days for reevaluation as needed.  Patient agrees with this plan and all questions and concerns addressed prior to discharge home.      Diagnosis:     ICD-10-CM    1. Sprain of left ankle, unspecified ligament, initial encounter  S93.402A       Discharge Medications:  New Prescriptions    No medications on file     Scribe Disclosure:  I, Miryam Lux, am serving as a scribe at 7:30 PM on 9/23/2021 to document services personally performed by Karyn Wilkerson PA-C based on my observations and the provider's statements to me.      [facility]               Karyn Wilkerson PA-C  09/24/21 0018

## 2021-09-23 NOTE — ED TRIAGE NOTES
Rolled ankle getting out of work van this evening. Pt reports that he heard his ankle pop about 5 times.

## 2021-09-24 ASSESSMENT — ENCOUNTER SYMPTOMS: NUMBNESS: 0

## 2021-09-24 NOTE — DISCHARGE INSTRUCTIONS
Take ibuprofen 600 mg 3x per day with food. This will provide both pain control and fight against inflammation.   You may take 500-1000 mg of Tylenol every 6 hours.  Do not exceed 3000 mg of acetaminophen (Tylenol) daily.    *Wear splint as directed.  Use crutches with weight bearing as tolerated.  Rest, ice, elevation.  *Take medications as prescribed.    *Follow-up with orthopedics in 1-2 weeks if pain continues.   *Return if you become worse in any way.    Discharge Instructions  Ankle Sprain    An ankle sprain is a stretching or tearing of a ligament around your ankle joint. In most cases, we recommend resting the ankle for about 3 days, followed by return to activity. Some severe sprains need longer periods of rest, or can require a cast or boot to immobilize them.    Return to the Emergency Department if:  Your pain is much worse, or if there is pain in a new area.  Your foot or leg becomes pale, cool, blue, or numb or tingling   There is anything concerning to you about how your ankle looks  Any splint or device is feeling too tight, causing pain, or rubbing into your skin    Follow-up with your doctor:  As recommended by your emergency physician  If your ankle is not back to normal within about 1 week  If you are involved in significant athletic activities        Treatment:  Apply ice your injured area for 15 minutes at a time, at least 3 times a day for the first 1-2 days. Use a cloth between the ice bag and your skin to prevent frostbite.   Do not sleep with an ice pack or heating pad on, since this can cause burns or skin injury.  Raise the injured area above the level of your heart as much as possible in the first 1-2 days.  Pain medications -- Take a pain medication such as acetaminophen (Tylenol ), ibuprofen (Advil , Nuprin  ) or naproxen (Aleve ).  If you have been given a narcotic (such as codeine, hydrocodone, or oxycodone) do not drive for four hours after you have taken it. If the narcotic  contains acetaminophen (Tylenol), do not take Tylenol with it. All narcotics tend to cause constipation, so eat a high fiber diet.    Splint. We often give a stirrup-shaped ankle splint to support your ankle and prevent it from turning again. Wear this all the time for the first 3-5 days, and then as directed by your doctor.  Crutches. If you can t put wait on the ankle without a lot of pain, we recommend crutches. You can put as much weight on the ankle as possible without severe pain.   Compression. An elastic bandage (Ace   wrap) can help with pain and swelling. Remove this at least twice a day, and leave it off for several hours if you develop swelling of the foot.     Remember that you can always come back to the Emergency Department if you are not able to see your regular doctor in the amount of time listed above, if you get any new symptoms, or if there is anything that worries you.

## 2021-10-24 ENCOUNTER — HEALTH MAINTENANCE LETTER (OUTPATIENT)
Age: 31
End: 2021-10-24

## 2021-11-01 ENCOUNTER — TELEPHONE (OUTPATIENT)
Dept: BEHAVIORAL HEALTH | Facility: CLINIC | Age: 31
End: 2021-11-01

## 2021-11-01 ENCOUNTER — OFFICE VISIT (OUTPATIENT)
Dept: FAMILY MEDICINE | Facility: CLINIC | Age: 31
End: 2021-11-01
Payer: COMMERCIAL

## 2021-11-01 VITALS
SYSTOLIC BLOOD PRESSURE: 118 MMHG | TEMPERATURE: 98.2 F | DIASTOLIC BLOOD PRESSURE: 64 MMHG | OXYGEN SATURATION: 98 % | RESPIRATION RATE: 18 BRPM | WEIGHT: 189 LBS | HEART RATE: 94 BPM | BODY MASS INDEX: 27.91 KG/M2

## 2021-11-01 DIAGNOSIS — S93.402D SPRAIN OF LEFT ANKLE, UNSPECIFIED LIGAMENT, SUBSEQUENT ENCOUNTER: Primary | ICD-10-CM

## 2021-11-01 PROBLEM — R03.0 ELEVATED BLOOD-PRESSURE READING WITHOUT DIAGNOSIS OF HYPERTENSION: Status: RESOLVED | Noted: 2018-01-27 | Resolved: 2021-11-01

## 2021-11-01 PROBLEM — Z13.6 CARDIOVASCULAR SCREENING; LDL GOAL LESS THAN 160: Status: RESOLVED | Noted: 2019-04-24 | Resolved: 2021-11-01

## 2021-11-01 PROBLEM — K81.9 CHOLECYSTITIS: Status: RESOLVED | Noted: 2021-05-18 | Resolved: 2021-11-01

## 2021-11-01 PROBLEM — R74.8 ELEVATED LIVER ENZYMES: Status: RESOLVED | Noted: 2018-02-27 | Resolved: 2021-11-01

## 2021-11-01 PROCEDURE — 99213 OFFICE O/P EST LOW 20 MIN: CPT | Mod: 25 | Performed by: FAMILY MEDICINE

## 2021-11-01 PROCEDURE — 90686 IIV4 VACC NO PRSV 0.5 ML IM: CPT | Performed by: FAMILY MEDICINE

## 2021-11-01 PROCEDURE — 90732 PPSV23 VACC 2 YRS+ SUBQ/IM: CPT | Performed by: FAMILY MEDICINE

## 2021-11-01 PROCEDURE — 90471 IMMUNIZATION ADMIN: CPT | Performed by: FAMILY MEDICINE

## 2021-11-01 PROCEDURE — 90472 IMMUNIZATION ADMIN EACH ADD: CPT | Performed by: FAMILY MEDICINE

## 2021-11-01 ASSESSMENT — ANXIETY QUESTIONNAIRES
7. FEELING AFRAID AS IF SOMETHING AWFUL MIGHT HAPPEN: SEVERAL DAYS
7. FEELING AFRAID AS IF SOMETHING AWFUL MIGHT HAPPEN: SEVERAL DAYS
5. BEING SO RESTLESS THAT IT IS HARD TO SIT STILL: SEVERAL DAYS
3. WORRYING TOO MUCH ABOUT DIFFERENT THINGS: SEVERAL DAYS
GAD7 TOTAL SCORE: 7
8. IF YOU CHECKED OFF ANY PROBLEMS, HOW DIFFICULT HAVE THESE MADE IT FOR YOU TO DO YOUR WORK, TAKE CARE OF THINGS AT HOME, OR GET ALONG WITH OTHER PEOPLE?: VERY DIFFICULT
GAD7 TOTAL SCORE: 7
1. FEELING NERVOUS, ANXIOUS, OR ON EDGE: SEVERAL DAYS
GAD7 TOTAL SCORE: 7
2. NOT BEING ABLE TO STOP OR CONTROL WORRYING: SEVERAL DAYS
6. BECOMING EASILY ANNOYED OR IRRITABLE: SEVERAL DAYS
4. TROUBLE RELAXING: SEVERAL DAYS

## 2021-11-01 ASSESSMENT — PATIENT HEALTH QUESTIONNAIRE - PHQ9
SUM OF ALL RESPONSES TO PHQ QUESTIONS 1-9: 9
SUM OF ALL RESPONSES TO PHQ QUESTIONS 1-9: 9
10. IF YOU CHECKED OFF ANY PROBLEMS, HOW DIFFICULT HAVE THESE PROBLEMS MADE IT FOR YOU TO DO YOUR WORK, TAKE CARE OF THINGS AT HOME, OR GET ALONG WITH OTHER PEOPLE: VERY DIFFICULT

## 2021-11-01 NOTE — PROGRESS NOTES
"  Assessment & Plan     Sprain of left ankle, unspecified ligament, subsequent encounter  Much improved, recommend ROM exercises of the ankle along with some strengthening especially as symptoms improve.  I recommend, resting the affected joint, elevation, use Ice on the joint for 15 minutes 4 times a day, and may use OTC medicine for pain management.        Follow up in 30 days if symptoms persist, sooner if symptoms worsen or new ones develops, pt may contact us over the phone for any questions or concerns.'         Tobacco Cessation:   reports that he has been smoking other. He has a 10.00 pack-year smoking history. He has quit using smokeless tobacco.  Tobacco Cessation Action Plan: Information offered: Patient not interested at this time    BMI:   Estimated body mass index is 27.91 kg/m  as calculated from the following:    Height as of 6/6/21: 1.753 m (5' 9\").    Weight as of this encounter: 85.7 kg (189 lb).   Weight management plan: Discussed healthy diet and exercise guidelines        Return in about 4 weeks (around 11/29/2021), or if symptoms worsen or fail to improve.    Obed Sandoval MD  [facility] JONNIE Saini is a 31 year old who presents for the following health issues     History of Present Illness       He eats 0-1 servings of fruits and vegetables daily.He consumes 6 sweetened beverage(s) daily.He exercises with enough effort to increase his heart rate 60 or more minutes per day.  He exercises with enough effort to increase his heart rate 4 days per week.   He is taking medications regularly.       ED/ Followup:    Facility:  [facility] emergency   Date of visit: 09/23/2021  Reason for visit: ankle pain   Current Status: improving    Pt went to ER and had Xray and CT ankle that showed sprain of the ankle, now it is feeling ok, with some pain when he is walking, and occasionally gives out,         Review of Systems         Objective    There were no " vitals taken for this visit.  There is no height or weight on file to calculate BMI.  Physical Exam   Ankle exam :   Inspection: walk : pronation nl supination nl  ROM  Of the ankle : flexion: nl   Dorsiflexion:nl   inversion:painful  aversion : nl  Resisted ROM is as above  There is positive  swelling and tenderness over the lateral malleolus,   no medial malleolus tenderness or swelling.  Navicular bone none tender.  tib-fib squeeze sign :negative  Proximal fibula none tender.  . No tenderness over the anterior aspect of the ankle.  no tenderness over the ATFL.  Anterior drawer sign : negative.    The fifth metatarsal is not tender. The ankle joint is intact without excessive opening on stressing.                 Answers for HPI/ROS submitted by the patient on 11/1/2021  If you checked off any problems, how difficult have these problems made it for you to do your work, take care of things at home, or get along with other people?: Very difficult  PHQ9 TOTAL SCORE: 9  RADHA 7 TOTAL SCORE: 7

## 2021-11-01 NOTE — TELEPHONE ENCOUNTER
This Clinician called patient for pre-screening prior to upcoming Diagnostic Assessment appointment. Patient states he wants ADHD eval and medications. Patient agreed to me sending resources via my chart.  He does not want a DA at this time so appointment was cancelled.       GOLDY Schaefer, [facility]  [facility]  Phone: [phone]

## 2021-11-02 ASSESSMENT — PATIENT HEALTH QUESTIONNAIRE - PHQ9: SUM OF ALL RESPONSES TO PHQ QUESTIONS 1-9: 9

## 2021-11-02 ASSESSMENT — ANXIETY QUESTIONNAIRES: GAD7 TOTAL SCORE: 7

## 2022-02-13 ENCOUNTER — HEALTH MAINTENANCE LETTER (OUTPATIENT)
Age: 32
End: 2022-02-13

## 2022-09-21 NOTE — TELEPHONE ENCOUNTER
Message from [facility]:  Original authorizing provider: MD Diogenes Mabry would like a refill of the following medications:  FLUoxetine (PROZAC) 40 MG capsule [Kyung Ryan MD]    Preferred pharmacy: [address] [address] [facility]    Comment:  Have run out of my Prozac, I have an appt scheduled for a med check and would like a refill   [Dear  ___] : Dear  [unfilled], [Consult Letter:] : I had the pleasure of evaluating your patient, [unfilled]. [Consult Closing:] : Thank you very much for allowing me to participate in the care of this patient.  If you have any questions, please do not hesitate to contact me. [Sincerely,] : Sincerely, [FreeTextEntry2] : Samara Paul, DP\par [facility]\par [address] [facility]\par [place] [address]\par phone [phone] [FreeTextEntry1] : RICKIE DEJESUS was seen in our [facility] Pediatric Weight Management Program by myself and our Registered Dietician. Preliminary RD note is copied here, with my note below.\par \par RICKIE DEJESUS is being seen for an initial visit for weight management . Initial nutrition evaluation, assessment and counseling conducted today.\par \par As per Dr. Herrmann's note--Has been on and off diets for years since age 8\par Did see nutritionist once in the past \par COVID made everything worse, easy to do nothing and sit and snack, stopped consistent sports \par \par other nutritionist for a few months 1 x week pre-COVID, then stopped\par Eating more vegaetables..still not a fan\par sprained foot recurrent from about 1 year ago or longer, never treated..went away, last year put on rest\par last week\par has custom orthotics...do not always fit\par also has neck and back discomfort\par \par plays basketball since 3rd grade games and practice---in May 3 plus, now restart 1-2 x week\par school team will have tryouts in Nov.\par school gym 2-3 x week.... now just running\par over summer worked at a camp\par has a gym membership, went over summer-waalk, jog treadmill, some weights by watching Youtube videos\par Had done virtual over COVID\par stationary bike, also treadmill\par \par Lives with mom, dad, 10 yo brother, sister away at college\par Not restricting any food\par 1st menses age 8.... monthly\par previously tracked what she was eating with other nutritionist\par used weight watchers in past\par stress eat and over eats and admits restricting which then leads to overeating\par mom reports out to eat--eats fast and too much, at home also\par \par packs food and buys food, open lunch, no cafeteria\par able to eat dinner before basketball\par can make own food--no pasta--wrap w/ turkey^ cheese\par chew gum to stop eating so she cannot put more food in her mouth\par \par Eats a lot of candy at school as per mom---\par cough drop---\par allergy meds\par spends 6 hours sitting after school. \par Patient accompanied by parent/.   \par \par 24 Hour Recall: \par Breakfast: bobos--graniola w/ apple, water Time meal eaten: 6:45-7 \par Lunch: pizza x 1 slice, water Time meal eaten: 9:45-10:30 \par Snack: bill bears \par Dinner: waits for dad Time meal eaten: 7 or so \par Typical Eating Pattern: \par Breakfast: skipped 2-3 x week school days \par Beverage Intake: \par Coffee/Tea: black w/ creamer oat milk 1 x week 1-2 x week \par smoothie 1-2 x week \par Juice: 1 x week \par Water: 70 oz/d (gatorade refillable 34 oz..has 2-2.5/d) \par Lemonade: 3 x week, diluted with water \par Iced Tea: few times/month \par Milk alternatives: oat milk, lactaid milk.  [FreeTextEntry3] : Marline Herrmann MD, MS, FAAP\par Diplomate of the American Board of Obesity Medicine\par [facility] phone: [phone]\par [facility] phone: [phone]\par Clinic fax: [phone]

## 2022-10-15 ENCOUNTER — HEALTH MAINTENANCE LETTER (OUTPATIENT)
Age: 32
End: 2022-10-15

## 2022-12-05 ENCOUNTER — MYC MEDICAL ADVICE (OUTPATIENT)
Dept: FAMILY MEDICINE | Facility: CLINIC | Age: 32
End: 2022-12-05

## 2022-12-06 ENCOUNTER — VIRTUAL VISIT (OUTPATIENT)
Dept: FAMILY MEDICINE | Facility: CLINIC | Age: 32
End: 2022-12-06
Payer: COMMERCIAL

## 2022-12-06 DIAGNOSIS — F33.1 MODERATE EPISODE OF RECURRENT MAJOR DEPRESSIVE DISORDER (H): ICD-10-CM

## 2022-12-06 PROCEDURE — 99213 OFFICE O/P EST LOW 20 MIN: CPT | Mod: 95 | Performed by: NURSE PRACTITIONER

## 2022-12-06 RX ORDER — BUPROPION HYDROCHLORIDE 300 MG/1
300 TABLET ORAL EVERY MORNING
Qty: 90 TABLET | Refills: 1 | Status: SHIPPED | OUTPATIENT
Start: 2022-12-06 | End: 2023-07-19

## 2022-12-06 ASSESSMENT — ANXIETY QUESTIONNAIRES
8. IF YOU CHECKED OFF ANY PROBLEMS, HOW DIFFICULT HAVE THESE MADE IT FOR YOU TO DO YOUR WORK, TAKE CARE OF THINGS AT HOME, OR GET ALONG WITH OTHER PEOPLE?: SOMEWHAT DIFFICULT
7. FEELING AFRAID AS IF SOMETHING AWFUL MIGHT HAPPEN: NOT AT ALL
2. NOT BEING ABLE TO STOP OR CONTROL WORRYING: MORE THAN HALF THE DAYS
GAD7 TOTAL SCORE: 4
3. WORRYING TOO MUCH ABOUT DIFFERENT THINGS: NOT AT ALL
GAD7 TOTAL SCORE: 4
6. BECOMING EASILY ANNOYED OR IRRITABLE: MORE THAN HALF THE DAYS
4. TROUBLE RELAXING: NOT AT ALL
5. BEING SO RESTLESS THAT IT IS HARD TO SIT STILL: NOT AT ALL
1. FEELING NERVOUS, ANXIOUS, OR ON EDGE: NOT AT ALL
GAD7 TOTAL SCORE: 4
IF YOU CHECKED OFF ANY PROBLEMS ON THIS QUESTIONNAIRE, HOW DIFFICULT HAVE THESE PROBLEMS MADE IT FOR YOU TO DO YOUR WORK, TAKE CARE OF THINGS AT HOME, OR GET ALONG WITH OTHER PEOPLE: SOMEWHAT DIFFICULT
7. FEELING AFRAID AS IF SOMETHING AWFUL MIGHT HAPPEN: NOT AT ALL

## 2022-12-06 ASSESSMENT — PATIENT HEALTH QUESTIONNAIRE - PHQ9
SUM OF ALL RESPONSES TO PHQ QUESTIONS 1-9: 5
10. IF YOU CHECKED OFF ANY PROBLEMS, HOW DIFFICULT HAVE THESE PROBLEMS MADE IT FOR YOU TO DO YOUR WORK, TAKE CARE OF THINGS AT HOME, OR GET ALONG WITH OTHER PEOPLE: SOMEWHAT DIFFICULT
SUM OF ALL RESPONSES TO PHQ QUESTIONS 1-9: 5

## 2022-12-06 NOTE — PROGRESS NOTES
Parveen is a 32 year old who is being evaluated via a billable video visit.      How would you like to obtain your AVS? MyChart  If the video visit is dropped, the invitation should be resent by: Text to cell phone: [phone]  Will anyone else be joining your video visit? No          Assessment & Plan     Moderate episode of recurrent major depressive disorder (H)  Overall, well controlled.  Was following with [facility] for refills, but would like to be back in the [facility].  Refills provided, we discussed establishing a PCP closer to home and getting a yearly physical to address overdue HM.    - FLUoxetine (PROZAC) 20 MG capsule; TAKE 2 CAPSULES BY MOUTH ONCE DAILY  - buPROPion (WELLBUTRIN XL) 300 MG 24 hr tablet; Take 1 tablet (300 mg) by mouth every morning    Prescription drug management       Nicotine/Tobacco Cessation:  He reports that he has been smoking other. He has a 10.00 pack-year smoking history. He has quit using smokeless tobacco.  Nicotine/Tobacco Cessation Plan:   Information offered: Patient not interested at this time          No follow-ups on file.   Follow-up Visit   Expected date:  Jan 06, 2023 (Approximate)      Follow Up Appointment Details:     Follow-up with whom?: Any Primary Care Provider    Follow-Up for what?: Adult Preventive    How?: In Person                    [facility]   Parveen is a 32 year old, presenting for the following health issues:  medication refill       History of Present Illness       Mental Health Follow-up:  Patient presents to follow-up on Depression & Anxiety.Patient's depression since last visit has been:  No change  The patient is not having other symptoms associated with depression.  Patient's anxiety since last visit has been:  No change  The patient is not having other symptoms associated with anxiety.  Any significant life events: No  Patient is not feeling anxious or having panic  "attacks.  Patient has no concerns about alcohol or drug use.    He eats 2-3 servings of fruits and vegetables daily.He consumes 5 sweetened beverage(s) daily.He exercises with enough effort to increase his heart rate 10 to 19 minutes per day.  He exercises with enough effort to increase his heart rate 3 or less days per week.   He is taking medications regularly.    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     Overall feels mood is good, having a tough time with not currently working.   Was following with [facility].           Review of Systems   Constitutional, HEENT, cardiovascular, pulmonary, gi and gu systems are negative, except as otherwise noted.      Objective    Vitals - Patient Reported  Weight (Patient Reported): 86.2 kg (190 lb)  Height (Patient Reported): 509 cm (16' 8.39\")  BMI (Based on Pt Reported Ht/Wt): 3.33      Vitals:  No vitals were obtained today due to virtual visit.    Physical Exam   GENERAL: Healthy, alert and no distress  EYES: Eyes grossly normal to inspection.  No discharge or erythema, or obvious scleral/conjunctival abnormalities.  RESP: No audible wheeze, cough, or visible cyanosis.  No visible retractions or increased work of breathing.    SKIN: Visible skin clear. No significant rash, abnormal pigmentation or lesions.  NEURO: Cranial nerves grossly intact.  Mentation and speech appropriate for age.  PSYCH: Mentation appears normal, affect normal/bright, judgement and insight intact, normal speech and appearance well-groomed.                Video-Visit Details    Video Start Time: 0927    Type of service:  Video Visit    Video End " Time:0935    Originating Location (pt. Location): Home        Distant Location (provider location):  Off-site    Platform used for Video Visit: Abbi

## 2023-03-26 ENCOUNTER — HEALTH MAINTENANCE LETTER (OUTPATIENT)
Age: 33
End: 2023-03-26

## 2023-05-15 ENCOUNTER — OFFICE VISIT (OUTPATIENT)
Dept: URGENT CARE | Facility: URGENT CARE | Age: 33
End: 2023-05-15
Payer: COMMERCIAL

## 2023-05-15 VITALS
DIASTOLIC BLOOD PRESSURE: 74 MMHG | OXYGEN SATURATION: 97 % | RESPIRATION RATE: 14 BRPM | SYSTOLIC BLOOD PRESSURE: 118 MMHG | HEART RATE: 113 BPM | TEMPERATURE: 98.5 F | WEIGHT: 190 LBS | BODY MASS INDEX: 28.06 KG/M2

## 2023-05-15 DIAGNOSIS — R07.0 THROAT PAIN: Primary | ICD-10-CM

## 2023-05-15 DIAGNOSIS — H65.193 ACUTE MUCOID OTITIS MEDIA OF BOTH EARS: ICD-10-CM

## 2023-05-15 LAB
DEPRECATED S PYO AG THROAT QL EIA: NEGATIVE
GROUP A STREP BY PCR: NOT DETECTED

## 2023-05-15 PROCEDURE — 87651 STREP A DNA AMP PROBE: CPT | Performed by: NURSE PRACTITIONER

## 2023-05-15 PROCEDURE — 99213 OFFICE O/P EST LOW 20 MIN: CPT | Performed by: NURSE PRACTITIONER

## 2023-05-15 RX ORDER — AZITHROMYCIN 250 MG/1
TABLET, FILM COATED ORAL
Qty: 6 TABLET | Refills: 0 | Status: SHIPPED | OUTPATIENT
Start: 2023-05-15 | End: 2023-05-20

## 2023-05-15 NOTE — PROGRESS NOTES
Chief Complaint   Patient presents with     Sick     SON has POS strep ----Stuffy nose, congestion, ST, ear pain x   3 days -- took nothing today     SUBJECTIVE:  Diogenes Cody is a 32 year old male presenting with runny stuffy nose sore throat earache cough mucus for 3 days.  He feels his symptoms are worsening.  His son does have strep throat.  He is prone to ear infections with multiple tubes placed over the years.  Has not tried any medicine for this.  No chest pain shortness of breath, he is a smoker.    Past Medical History:   Diagnosis Date     Concussion age 14     Depressive disorder Early      GERD (gastroesophageal reflux disease)      H/O meningitis      Hypertension 2018?     Varicella      buPROPion (WELLBUTRIN XL) 300 MG 24 hr tablet, Take 1 tablet (300 mg) by mouth every morning  Cholecalciferol (VITAMIN D-3 PO),   fish oil-omega-3 fatty acids 1000 MG capsule,   FLUoxetine (PROZAC) 40 MG capsule, Take 2 capsules (80 mg) by mouth daily  MILK THISTLE PO, Take 525 mg by mouth   OMEPRAZOLE PO,   FLUoxetine (PROZAC) 20 MG capsule, TAKE 2 CAPSULES BY MOUTH ONCE DAILY (Patient not taking: Reported on 5/15/2023)  HYDROcodone-acetaminophen (NORCO) 5-325 MG tablet, Take 1-2 tablets by mouth every 4 hours as needed for moderate to severe pain (Patient not taking: Reported on 5/15/2023)  senna-docusate (SENOKOT-S/PERICOLACE) 8.6-50 MG tablet, Take 1-2 tablets by mouth 2 times daily (Patient not taking: Reported on 5/15/2023)    No current facility-administered medications on file prior to visit.    Social History     Tobacco Use     Smoking status: Former     Packs/day: 1.00     Years: 10.00     Pack years: 10.00     Types: Other, Cigarettes     Quit date: 2018     Years since quittin.3     Smokeless tobacco: Former     Tobacco comments:     vape   Vaping Use     Vaping status: Some Days     Substances: Nicotine, THC     Devices: Disposable   Substance Use Topics     Alcohol use: Not  Currently     Alcohol/week: 2.0 standard drinks of alcohol     Comment: 2 per day     Allergies   Allergen Reactions     Amoxicillin      Ceclor [Cefaclor]      Sulfa Antibiotics      Sulfasalazine Hives     Vantin [Cefpodoxime] Hives       Review of Systems   All systems negative except for those listed above in HPI.    OBJECTIVE:   /74 (BP Location: Right arm, Patient Position: Chair, Cuff Size: Adult Regular)   Pulse 113   Temp 98.5  F (36.9  C) (Oral)   Resp 14   Wt 86.2 kg (190 lb)   SpO2 97%   BMI 28.06 kg/m       Physical Exam  Vitals reviewed.   Constitutional:       Appearance: Normal appearance. He is ill-appearing.   HENT:      Head: Normocephalic and atraumatic.      Ears:      Comments: Bilateral TMs with white mucoid bubbly bulge and scar tissue.  Overall right TM does have mild erythema.     Nose: Congestion and rhinorrhea present.      Mouth/Throat:      Mouth: Mucous membranes are moist.      Pharynx: Posterior oropharyngeal erythema present. No oropharyngeal exudate.   Eyes:      Extraocular Movements: Extraocular movements intact.      Conjunctiva/sclera: Conjunctivae normal.      Pupils: Pupils are equal, round, and reactive to light.   Cardiovascular:      Rate and Rhythm: Normal rate.      Pulses: Normal pulses.   Pulmonary:      Effort: Respiratory distress present.      Breath sounds: Normal breath sounds. No stridor. No wheezing, rhonchi or rales.   Chest:      Chest wall: No tenderness.   Abdominal:      General: Abdomen is flat.      Palpations: Abdomen is soft.   Musculoskeletal:         General: Normal range of motion.      Cervical back: Normal range of motion and neck supple.   Lymphadenopathy:      Cervical: Cervical adenopathy present.   Skin:     General: Skin is warm and dry.      Findings: No rash.   Neurological:      General: No focal deficit present.      Mental Status: He is alert and oriented to person, place, and time.   Psychiatric:         Mood and Affect: Mood  normal.         Behavior: Behavior normal.       Results for orders placed or performed in visit on 05/15/23   Streptococcus A Rapid Screen w/Reflex to PCR - Clinic Collect     Status: Normal    Specimen: Throat; Swab   Result Value Ref Range    Group A Strep antigen Negative Negative     ASSESSMENT:    ICD-10-CM    1. Throat pain  R07.0 Streptococcus A Rapid Screen w/Reflex to PCR - Clinic Collect     Group A Streptococcus PCR Throat Swab      2. Acute mucoid otitis media of both ears  H65.113 azithromycin (ZITHROMAX) 250 MG tablet        PLAN:     Rapid strep test today is negative.   Your throat culture is pending. Urgent Care will call if positive results to start antibiotics at that time; No call if the culture is negative.  Drink plenty of fluids and rest.  May use salt water gargles- about 8 oz warm water with about 1 teaspoon salt  Sucrets and Cepacol spray are over the counter medications that numb the throat.  Over the counter pain relievers such as tylenol or ibuprofen may be used as needed.   Mucinex is product known to help loosen congestion and thin mucus (generic is guaifenesin)   Delsym 12 hour over the counter works well for cough.  Honey has been shown to be helpful in cough management and is soothing to a sore throat. May add to lemon tea.  Please follow up with primary care provider if not improving, worsening or new symptoms.    Z-Riley on hold at the pharmacy if worsening earaches in the next 2 to 3 days  No obvious ear infection today, but there is a mucoid bulge of white fluid  Could try Zyrtec Benadryl Sudafed Flonase Mucinex  Pt declined CBC    Follow up with primary care provider with any problems, questions or concerns or if symptoms worsen or fail to improve. Patient agreed to plan and verbalized understanding.    Kiley Faulkner, [facility]

## 2023-05-15 NOTE — PATIENT INSTRUCTIONS
Rapid strep test today is negative.   Your throat culture is pending. Urgent Care will call if positive results to start antibiotics at that time; No call if the culture is negative.  Drink plenty of fluids and rest.  May use salt water gargles- about 8 oz warm water with about 1 teaspoon salt  Sucrets and Cepacol spray are over the counter medications that numb the throat.  Over the counter pain relievers such as tylenol or ibuprofen may be used as needed.   Mucinex is product known to help loosen congestion and thin mucus (generic is guaifenesin)   Delsym 12 hour over the counter works well for cough.  Honey has been shown to be helpful in cough management and is soothing to a sore throat. May add to lemon tea.  Please follow up with primary care provider if not improving, worsening or new symptoms.    Z-Riley on hold at the pharmacy if worsening earaches in the next 2 to 3 days  No obvious ear infection today, but there is a mucoid bulge of white fluid  Could try Zyrtec Benadryl Sudafed Flonase Mucinex

## 2023-06-25 DIAGNOSIS — F33.1 MODERATE EPISODE OF RECURRENT MAJOR DEPRESSIVE DISORDER (H): ICD-10-CM

## 2023-06-27 RX ORDER — BUPROPION HYDROCHLORIDE 300 MG/1
TABLET ORAL
Qty: 90 TABLET | Refills: 0 | OUTPATIENT
Start: 2023-06-27

## 2023-06-27 NOTE — TELEPHONE ENCOUNTER
Needs to establish with a PCP.  He was instructed to do this at our last visit and to set up PCP closer to home.

## 2023-06-27 NOTE — TELEPHONE ENCOUNTER
Routing refill request to provider for review/approval because:  --Last visit:  12/6/2022 virtual with Jamie -  Refills provided, we discussed establishing a PCP closer to home and getting a yearly physical to address overdue HM. Follow up was to be Adult preventive in January 2023.  --Last PHQ-9 >4.     ,  --Please contact patient and ask to schedule an annual preventive/physical, and establish with a provider near him as planned in last and only visit (virtual) at [place]..    --This request has been routed to provider to authorize.     --Future Visit: none.        --Last Written Prescription:     Disp Refills Start End SUSANNA   buPROPion (WELLBUTRIN XL) 300 MG 24 hr tablet 90 tablet 1 12/6/2022  No   Sig - Route: Take 1 tablet (300 mg) by mouth every morning - Oral               6/11/2021    12:01 PM 11/1/2021     9:28 AM 12/6/2022     9:16 AM   PHQ   PHQ-9 Total Score 4 9    9    9 5   Q9: Thoughts of better off dead/self-harm past 2 weeks Not at all Not at all    Not at all    Not at all Not at all

## 2023-07-19 ENCOUNTER — VIRTUAL VISIT (OUTPATIENT)
Dept: FAMILY MEDICINE | Facility: CLINIC | Age: 33
End: 2023-07-19
Payer: COMMERCIAL

## 2023-07-19 DIAGNOSIS — F33.1 MODERATE EPISODE OF RECURRENT MAJOR DEPRESSIVE DISORDER (H): Primary | ICD-10-CM

## 2023-07-19 DIAGNOSIS — G47.33 OSA (OBSTRUCTIVE SLEEP APNEA): ICD-10-CM

## 2023-07-19 PROCEDURE — 99214 OFFICE O/P EST MOD 30 MIN: CPT | Mod: VID | Performed by: NURSE PRACTITIONER

## 2023-07-19 RX ORDER — BUPROPION HYDROCHLORIDE 300 MG/1
300 TABLET ORAL EVERY MORNING
Qty: 90 TABLET | Refills: 1 | Status: SHIPPED | OUTPATIENT
Start: 2023-07-19 | End: 2023-11-30

## 2023-07-19 RX ORDER — FLUOXETINE 40 MG/1
80 CAPSULE ORAL DAILY
Qty: 180 CAPSULE | Refills: 2 | Status: SHIPPED | OUTPATIENT
Start: 2023-07-19 | End: 2023-11-30

## 2023-07-19 ASSESSMENT — ANXIETY QUESTIONNAIRES
1. FEELING NERVOUS, ANXIOUS, OR ON EDGE: SEVERAL DAYS
7. FEELING AFRAID AS IF SOMETHING AWFUL MIGHT HAPPEN: NOT AT ALL
3. WORRYING TOO MUCH ABOUT DIFFERENT THINGS: NOT AT ALL
IF YOU CHECKED OFF ANY PROBLEMS ON THIS QUESTIONNAIRE, HOW DIFFICULT HAVE THESE PROBLEMS MADE IT FOR YOU TO DO YOUR WORK, TAKE CARE OF THINGS AT HOME, OR GET ALONG WITH OTHER PEOPLE: SOMEWHAT DIFFICULT
4. TROUBLE RELAXING: SEVERAL DAYS
5. BEING SO RESTLESS THAT IT IS HARD TO SIT STILL: NOT AT ALL
2. NOT BEING ABLE TO STOP OR CONTROL WORRYING: SEVERAL DAYS
GAD7 TOTAL SCORE: 3
6. BECOMING EASILY ANNOYED OR IRRITABLE: NOT AT ALL
GAD7 TOTAL SCORE: 3

## 2023-07-19 ASSESSMENT — PATIENT HEALTH QUESTIONNAIRE - PHQ9
10. IF YOU CHECKED OFF ANY PROBLEMS, HOW DIFFICULT HAVE THESE PROBLEMS MADE IT FOR YOU TO DO YOUR WORK, TAKE CARE OF THINGS AT HOME, OR GET ALONG WITH OTHER PEOPLE: SOMEWHAT DIFFICULT
SUM OF ALL RESPONSES TO PHQ QUESTIONS 1-9: 5
SUM OF ALL RESPONSES TO PHQ QUESTIONS 1-9: 5

## 2023-07-19 NOTE — PROGRESS NOTES
Parveen is a 33 year old who is being evaluated via a billable video visit.      How would you like to obtain your AVS? MyChart  If the video visit is dropped, the invitation should be resent by: Text to cell phone: [phone]  Will anyone else be joining your video visit? No          Assessment & Plan     Moderate episode of recurrent major depressive disorder (H)  Controlled. Continue current medication.   No current counseling and wishes to not pursue at this time.   Follow-up 6 months; sooner as needed.   - FLUoxetine (PROZAC) 40 MG capsule  Dispense: 180 capsule; Refill: 2  - buPROPion (WELLBUTRIN XL) 300 MG 24 hr tablet  Dispense: 90 tablet; Refill: 1    LARRY (obstructive sleep apnea)  Not at goal. Working with sleep clinic with CPAP.                    [facility]   Parveen is a 33 year old, presenting for the following health issues:  No chief complaint on file.         No data to display              HPI     To follow-up on fluoxetine 80 mg and bupropion 300 mg.   Good management.   No side effects.   Sleep is inconsistent due to finding management for LARRY currently.           11/1/2021     9:28 AM 12/6/2022     9:16 AM 7/19/2023     6:47 AM   PHQ   PHQ-9 Total Score 9    9    9 5 5   Q9: Thoughts of better off dead/self-harm past 2 weeks Not at all    Not at all    Not at all Not at all Not at all         11/1/2021     9:29 AM 12/6/2022     9:19 AM 7/19/2023     6:48 AM   RADHA-7 SCORE   Total Score 7 (mild anxiety) 4 (minimal anxiety) 3 (minimal anxiety)   Total Score 7    7    7 4 3             Review of Systems   Constitutional, HEENT, cardiovascular, pulmonary, gi and gu systems are negative, except as otherwise noted.      Objective           Vitals:  No vitals were obtained today due to virtual visit.    Physical Exam   GENERAL: Healthy, alert and no distress  EYES: Eyes grossly normal to inspection.  No discharge or erythema, or obvious  scleral/conjunctival abnormalities.  RESP: No audible wheeze, cough, or visible cyanosis.  No visible retractions or increased work of breathing.    SKIN: Visible skin clear. No significant rash, abnormal pigmentation or lesions.  NEURO: Cranial nerves grossly intact.  Mentation and speech appropriate for age.  PSYCH: Mentation appears normal, affect normal/bright, judgement and insight intact, normal speech and appearance well-groomed.                Video-Visit Details    Type of service:  Video Visit   Video Start Time: 0700  Video End Time:7:18 AM    Originating Location (pt. Location): Home    Distant Location (provider location):  Off-site  Platform used for Video Visit: DailyCred

## 2023-09-06 ENCOUNTER — OFFICE VISIT (OUTPATIENT)
Dept: URGENT CARE | Facility: URGENT CARE | Age: 33
End: 2023-09-06
Payer: COMMERCIAL

## 2023-09-06 VITALS
RESPIRATION RATE: 14 BRPM | TEMPERATURE: 98.2 F | BODY MASS INDEX: 28.06 KG/M2 | HEART RATE: 73 BPM | WEIGHT: 190 LBS | SYSTOLIC BLOOD PRESSURE: 118 MMHG | DIASTOLIC BLOOD PRESSURE: 64 MMHG | OXYGEN SATURATION: 98 %

## 2023-09-06 DIAGNOSIS — M75.41 IMPINGEMENT SYNDROME OF SHOULDER REGION, RIGHT: Primary | ICD-10-CM

## 2023-09-06 PROCEDURE — 99214 OFFICE O/P EST MOD 30 MIN: CPT | Performed by: FAMILY MEDICINE

## 2023-09-06 RX ORDER — DICLOFENAC SODIUM 75 MG/1
75 TABLET, DELAYED RELEASE ORAL 2 TIMES DAILY
Qty: 28 TABLET | Refills: 0 | Status: SHIPPED | OUTPATIENT
Start: 2023-09-06 | End: 2023-11-30

## 2023-09-06 NOTE — PROGRESS NOTES
ICD-10-CM    1. Impingement syndrome of shoulder region, right  M75.41 diclofenac (VOLTAREN) 75 MG EC tablet     Orthopedic  Referral        Denise and Tatyana tests positive for impingement.  Likely repetitive stress injury contributing to swelling and impingement symptoms.  Low suspicion for complete rotator cuff tear at this time.    PLAN:  Patient Instructions   Stop OTC pain medications.  Start diclofenac twice daily for 14 days to reduce pain and swelling.    Use arnica gel (available OTC) 2-3 times daily on areas of soreness.    Follow up with sports medicine if not steadily improving over the next 2 weeks.    See letter for work restrictions.    SUBJECTIVE:  Diogenes Cody is a right-handed 33 year old male who presents to  today with R shoulder pain for about 3 weeks.  Occasionally generalized numbness in the hand.  Pain worse with movement of the shoulder, especially internal rotation.  Has been using OTC medications periodically.        OBJECTIVE:  /64 (BP Location: Left arm, Patient Position: Chair, Cuff Size: Adult Regular)   Pulse 73   Temp 98.2  F (36.8  C) (Oral)   Resp 14   Wt 86.2 kg (190 lb)   SpO2 98%   BMI 28.06 kg/m    GEN: well-appearing, in NAD  EXT: R shoulder with tenderness over the coracoid, bicipital groove, and lateral joint line, no palpable swelling noted.  Full ER.  IR to back pocket, and slightly painful at that extend.  Able to hold arm externally rotated with elbow at 90 degrees at shoulder level without difficulty.  Normal sensation and strength in the R hand.  No muscle wasting noted.  +Alicias, +Tatyana

## 2023-09-06 NOTE — LETTER
September 6, 2023      Diogenes Cody  [address] [facility] [address]        To Whom It May Concern:    Diogenes Cody was seen in our clinic. He may return to work with the following restrictions: no lifting more than 5 lbs with the right arm for the next 7 days.  No lifting more than 10 lbs with the right arm for the next 14 days total (7 days following the initial 5 lb restriction).    Sincerely,        Roxanne Corey MD

## 2023-09-06 NOTE — PATIENT INSTRUCTIONS
Stop OTC pain medications.  Start diclofenac twice daily for 14 days to reduce pain and swelling.    Use arnica gel (available OTC) 2-3 times daily on areas of soreness.    Follow up with sports medicine if not steadily improving over the next 2 weeks.

## 2023-11-30 ENCOUNTER — VIRTUAL VISIT (OUTPATIENT)
Dept: FAMILY MEDICINE | Facility: CLINIC | Age: 33
End: 2023-11-30
Payer: COMMERCIAL

## 2023-11-30 DIAGNOSIS — J34.2 DEVIATED NASAL SEPTUM: ICD-10-CM

## 2023-11-30 DIAGNOSIS — F33.1 MODERATE EPISODE OF RECURRENT MAJOR DEPRESSIVE DISORDER (H): Primary | ICD-10-CM

## 2023-11-30 DIAGNOSIS — M75.41 IMPINGEMENT SYNDROME, SHOULDER, RIGHT: ICD-10-CM

## 2023-11-30 PROCEDURE — 99214 OFFICE O/P EST MOD 30 MIN: CPT | Mod: VID | Performed by: PHYSICIAN ASSISTANT

## 2023-11-30 RX ORDER — FLUOXETINE 40 MG/1
80 CAPSULE ORAL DAILY
Qty: 180 CAPSULE | Refills: 1 | Status: SHIPPED | OUTPATIENT
Start: 2023-11-30 | End: 2024-04-18

## 2023-11-30 RX ORDER — BUPROPION HYDROCHLORIDE 300 MG/1
300 TABLET ORAL EVERY MORNING
Qty: 90 TABLET | Refills: 1 | Status: SHIPPED | OUTPATIENT
Start: 2023-11-30 | End: 2024-04-18

## 2023-11-30 ASSESSMENT — ANXIETY QUESTIONNAIRES
IF YOU CHECKED OFF ANY PROBLEMS ON THIS QUESTIONNAIRE, HOW DIFFICULT HAVE THESE PROBLEMS MADE IT FOR YOU TO DO YOUR WORK, TAKE CARE OF THINGS AT HOME, OR GET ALONG WITH OTHER PEOPLE: SOMEWHAT DIFFICULT
3. WORRYING TOO MUCH ABOUT DIFFERENT THINGS: SEVERAL DAYS
GAD7 TOTAL SCORE: 6
2. NOT BEING ABLE TO STOP OR CONTROL WORRYING: SEVERAL DAYS
6. BECOMING EASILY ANNOYED OR IRRITABLE: SEVERAL DAYS
1. FEELING NERVOUS, ANXIOUS, OR ON EDGE: MORE THAN HALF THE DAYS
5. BEING SO RESTLESS THAT IT IS HARD TO SIT STILL: NOT AT ALL
GAD7 TOTAL SCORE: 6
7. FEELING AFRAID AS IF SOMETHING AWFUL MIGHT HAPPEN: NOT AT ALL

## 2023-11-30 ASSESSMENT — PATIENT HEALTH QUESTIONNAIRE - PHQ9
SUM OF ALL RESPONSES TO PHQ QUESTIONS 1-9: 5
5. POOR APPETITE OR OVEREATING: SEVERAL DAYS

## 2023-11-30 NOTE — PROGRESS NOTES
allegra is a 33 year old who is being evaluated via a billable video visit.      How would you like to obtain your AVS? MyChart  If the video visit is dropped, the invitation should be resent by: Text to cell phone: [phone]  Will anyone else be joining your video visit? No          Assessment & Plan     Moderate episode of recurrent major depressive disorder (H)  Stable with meds.   - buPROPion (WELLBUTRIN XL) 300 MG 24 hr tablet; Take 1 tablet (300 mg) by mouth every morning  - FLUoxetine (PROZAC) 40 MG capsule; Take 2 capsules (80 mg) by mouth daily    Impingement syndrome, shoulder, right  Requesting repeat referral placed.   - Orthopedic  Referral; Future    Deviated nasal septum  Patient to call for appt.   - Adult ENT  Referral; Future                 Annmarie Fernandez PA-C  [facility]    Chester Saini is a 33 year old, presenting for the following health issues:  No chief complaint on file.        2023     5:05 PM   Additional Questions   Roomed by Adriana Pittman       HPI     Depression and Anxiety Follow-Up  How are you doing with your depression since your last visit? No change  How are you doing with your anxiety since your last visit?  No change  Are you having other symptoms that might be associated with depression or anxiety? No  Have you had a significant life event? No   Do you have any concerns with your use of alcohol or other drugs? No    Social History     Tobacco Use    Smoking status: Former     Packs/day: 1.00     Years: 10.00     Additional pack years: 0.00     Total pack years: 10.00     Types: Other, Cigarettes     Quit date: 2018     Years since quittin.8    Smokeless tobacco: Former    Tobacco comments:     vape   Vaping Use    Vaping Use: Every day    Substances: Nicotine, THC    Devices: Disposable   Substance Use Topics    Alcohol use: Not Currently     Alcohol/week: 2.0 standard drinks of alcohol     Comment: 2 per day     Drug use: Yes     Types: Marijuana         12/6/2022     9:16 AM 7/19/2023     6:47 AM 11/30/2023     5:07 PM   PHQ   PHQ-9 Total Score 5 5 5   Q9: Thoughts of better off dead/self-harm past 2 weeks Not at all Not at all Not at all         12/6/2022     9:19 AM 7/19/2023     6:48 AM 11/30/2023     5:07 PM   RADHA-7 SCORE   Total Score 4 (minimal anxiety) 3 (minimal anxiety)    Total Score 4 3 6         11/30/2023     5:07 PM   Last PHQ-9   1.  Little interest or pleasure in doing things 1   2.  Feeling down, depressed, or hopeless 1   3.  Trouble falling or staying asleep, or sleeping too much 1   4.  Feeling tired or having little energy 1   5.  Poor appetite or overeating 0   6.  Feeling bad about yourself 0   7.  Trouble concentrating 1   8.  Moving slowly or restless 0   Q9: Thoughts of better off dead/self-harm past 2 weeks 0   PHQ-9 Total Score 5   Difficulty at work, home, or with people Somewhat difficult         11/30/2023     5:07 PM   RADHA-7    1. Feeling nervous, anxious, or on edge 2   2. Not being able to stop or control worrying 1   3. Worrying too much about different things 1   4. Trouble relaxing 1   5. Being so restless that it is hard to sit still 0   6. Becoming easily annoyed or irritable 1   7. Feeling afraid, as if something awful might happen 0   RADHA-7 Total Score 6   If you checked any problems, how difficult have they made it for you to do your work, take care of things at home, or get along with other people? Somewhat difficult       Suicide Assessment Five-step Evaluation and Treatment (SAFE-T)      pt requesting referral to ENT for h/o deviated septum    Patient needs repeat referral to ortho. Was seen at  a few weeks ago for impingement of shoulder       Review of Systems   Constitutional, HEENT, cardiovascular, pulmonary, gi and gu systems are negative, except as otherwise noted.      Objective    Vitals - Patient Reported  Weight (Patient Reported): 94.3 kg (208 lb)  Height (Patient  "Reported): 176.5 cm (5' 9.5\")  BMI (Based on Pt Reported Ht/Wt): 30.28      Vitals:  No vitals were obtained today due to virtual visit.    Physical Exam   GENERAL: Healthy, alert and no distress  EYES: Eyes grossly normal to inspection.  No discharge or erythema, or obvious scleral/conjunctival abnormalities.  RESP: No audible wheeze, cough, or visible cyanosis.  No visible retractions or increased work of breathing.    SKIN: Visible skin clear. No significant rash, abnormal pigmentation or lesions.  NEURO: Cranial nerves grossly intact.  Mentation and speech appropriate for age.  PSYCH: Mentation appears normal, affect normal/bright, judgement and insight intact, normal speech and appearance well-groomed.                Video-Visit Details    Type of service:  Video Visit   Video Start Time:  5:55pm  Video End Time:6:07 PM    Originating Location (pt. Location): Home    Distant Location (provider location):  Off-site  Platform used for Video Visit: Abbi      "

## 2023-12-04 ENCOUNTER — OFFICE VISIT (OUTPATIENT)
Dept: ORTHOPEDICS | Facility: CLINIC | Age: 33
End: 2023-12-04
Attending: PHYSICIAN ASSISTANT
Payer: COMMERCIAL

## 2023-12-04 VITALS
BODY MASS INDEX: 30.21 KG/M2 | HEIGHT: 69 IN | DIASTOLIC BLOOD PRESSURE: 74 MMHG | SYSTOLIC BLOOD PRESSURE: 118 MMHG | WEIGHT: 204 LBS

## 2023-12-04 DIAGNOSIS — M75.41 IMPINGEMENT SYNDROME, SHOULDER, RIGHT: ICD-10-CM

## 2023-12-04 DIAGNOSIS — M75.01 ADHESIVE CAPSULITIS OF RIGHT SHOULDER: Primary | ICD-10-CM

## 2023-12-04 PROCEDURE — 99244 OFF/OP CNSLTJ NEW/EST MOD 40: CPT | Performed by: FAMILY MEDICINE

## 2023-12-04 RX ORDER — CYCLOBENZAPRINE HCL 10 MG
10 TABLET ORAL
Qty: 30 TABLET | Refills: 0 | Status: SHIPPED | OUTPATIENT
Start: 2023-12-04

## 2023-12-04 RX ORDER — DICLOFENAC SODIUM 75 MG/1
75 TABLET, DELAYED RELEASE ORAL 2 TIMES DAILY PRN
Qty: 60 TABLET | Refills: 1 | Status: SHIPPED | OUTPATIENT
Start: 2023-12-04

## 2023-12-04 NOTE — LETTER
12/4/2023         RE: Diogenes Cody  [address] [facility] [address]        Dear Colleague,    Thank you for referring your patient, Diogenes Cody, to the [facility] [place]. Please see a copy of my visit note below.    ASSESSMENT & PLAN  Patient Instructions     1. Adhesive capsulitis of right shoulder    2. Impingement syndrome, shoulder, right      -Patient has few months of progressive right shoulder pain initially due to bursitis and tendinitis and is now progressing into a frozen shoulder  -Patient will start formal physical therapy and home exercise program to increase range of motion, strength and function  -Patient reports improvement in pain while taking the diclofenac medication prescribed by the urgent care doctor.  Refill for diclofenac was ordered today.  Patient will also start Flexeril 10 mg at bedtime  -Patient will follow up if pain does not improve.  To consider possible right glenohumeral intra-articular cortisone injection if indicated  -Call direct clinic number [[phone]] at any time with questions or concerns.    Albert Yeo MD [facility]  [facility] [place]        -----    SUBJECTIVE  Diogenes Cody is a/an 33 year old Right handed male who is seen in consultation at the request of  Annmarie Fernandez PA-C for evaluation of right shoulder pain. The patient is seen by themselves.    Onset: 1.5 month(s) ago. Reports insidious onset without acute precipitating event.  Location of Pain: right anterior and posterior shoulder shoots down to the deltoid  Rating of Pain at worst: 6/10  Rating of Pain Currently: 2/10  Worsened by: Reaching behind and reaching across  Better with: Rest  Treatments tried: rest/activity avoidance, ibuprofen, other medications: oral diclofenac, and hand massage  Associated symptoms: numbness and tingling in the beginning but since has went away  Orthopedic history: YES -  "Date:   right shoulder  Relevant surgical history: NO  Social history: social history: works in [place]     Past Medical History:   Diagnosis Date     Concussion age 14     Depressive disorder Early      GERD (gastroesophageal reflux disease)      H/O meningitis      Hypertension 2018?     Varicella      Social History     Socioeconomic History     Marital status:    Tobacco Use     Smoking status: Former     Packs/day: 1.00     Years: 10.00     Additional pack years: 0.00     Total pack years: 10.00     Types: Other, Cigarettes     Quit date: 2018     Years since quittin.8     Smokeless tobacco: Former     Tobacco comments:     vape   Vaping Use     Vaping Use: Every day     Substances: Nicotine, THC     Devices: Disposable   Substance and Sexual Activity     Alcohol use: Not Currently     Alcohol/week: 2.0 standard drinks of alcohol     Comment: 2 per day     Drug use: Yes     Types: Marijuana     Sexual activity: Yes     Partners: Female     Birth control/protection: Condom, Pill   Other Topics Concern     Parent/sibling w/ CABG, MI or angioplasty before 65F 55M? No         Patient's past medical, surgical, social, and family histories were reviewed today and no changes are noted.    REVIEW OF SYSTEMS:  10 point ROS is negative other than symptoms noted above in HPI, Past Medical History or as stated below  Constitutional: NEGATIVE for fever, chills, change in weight  Skin: NEGATIVE for worrisome rashes, moles or lesions  GI/: NEGATIVE for bowel or bladder changes  Neuro: NEGATIVE for weakness, dizziness or paresthesias    OBJECTIVE:  /74   Ht 1.753 m (5' 9\")   Wt 92.5 kg (204 lb)   BMI 30.13 kg/m     General: healthy, alert and in no distress  HEENT: no scleral icterus or conjunctival erythema  Skin: no suspicious lesions or rash. No jaundice.  CV: regular rhythm by palpation  Resp: normal respiratory effort without conversational dyspnea   Psych: normal " mood and affect  Gait: normal steady gait with appropriate coordination and balance  Neuro: normal light touch sensory exam of the bilateral upper extremities.    MSK:  RIGHT SHOULDER  Inspection:    no swelling, bruising, discoloration, or obvious deformity or asymmetry  Palpation:    Tender about the anterior capsule. Remainder of bony and tendinous landmarks are nontender.  Active Range of Motion:     Abduction 1050, FF 1350, , IR SI joint.      Scapular dyskinesis absent  Strength:    Scapular plane abduction limited by pain,  ER painful, IR grossly intact, biceps not tested, triceps not tested  Special Tests:    Positive: Neer's, Joe', supraspinatus (empty can), and crossed arm adduction    Negative: drop arm/painful arc and Pullman's      Independent visualization of the below image:  No results found for this or any previous visit (from the past 24 hour(s)).        Albert Yeo MD [facility]      Again, thank you for allowing me to participate in the care of your patient.        Sincerely,        Albert Yeo, MD

## 2023-12-04 NOTE — PATIENT INSTRUCTIONS
1. Adhesive capsulitis of right shoulder    2. Impingement syndrome, shoulder, right      -Patient has few months of progressive right shoulder pain initially due to bursitis and tendinitis and is now progressing into a frozen shoulder  -Patient will start formal physical therapy and home exercise program to increase range of motion, strength and function  -Patient reports improvement in pain while taking the diclofenac medication prescribed by the urgent care doctor.  Refill for diclofenac was ordered today.  Patient will also start Flexeril 10 mg at bedtime  -Patient will follow up if pain does not improve.  To consider possible right glenohumeral intra-articular cortisone injection if indicated  -Call direct clinic number [[phone]] at any time with questions or concerns.    Albert Yeo MD [facility]  [facility] [place]

## 2023-12-04 NOTE — PROGRESS NOTES
ASSESSMENT & PLAN  Patient Instructions     1. Adhesive capsulitis of right shoulder    2. Impingement syndrome, shoulder, right      -Patient has few months of progressive right shoulder pain initially due to bursitis and tendinitis and is now progressing into a frozen shoulder  -Patient will start formal physical therapy and home exercise program to increase range of motion, strength and function  -Patient reports improvement in pain while taking the diclofenac medication prescribed by the urgent care doctor.  Refill for diclofenac was ordered today.  Patient will also start Flexeril 10 mg at bedtime  -Patient will follow up if pain does not improve.  To consider possible right glenohumeral intra-articular cortisone injection if indicated  -Call direct clinic number [[phone]] at any time with questions or concerns.    Albert Yeo MD [facility]  [facility]        -----    SUBJECTIVE  Diogenes Cody is a/an 33 year old Right handed male who is seen in consultation at the request of  Annmarie Fernandez PA-C for evaluation of right shoulder pain. The patient is seen by themselves.    Onset: 1.5 month(s) ago. Reports insidious onset without acute precipitating event.  Location of Pain: right anterior and posterior shoulder shoots down to the deltoid  Rating of Pain at worst: 6/10  Rating of Pain Currently: 2/10  Worsened by: Reaching behind and reaching across  Better with: Rest  Treatments tried: rest/activity avoidance, ibuprofen, other medications: oral diclofenac, and hand massage  Associated symptoms: numbness and tingling in the beginning but since has went away  Orthopedic history: YES - Date: 2005  right shoulder  Relevant surgical history: NO  Social history: social history: works in [employer]     Past Medical History:   Diagnosis Date    Concussion age 14    Depressive disorder Early 2000's    GERD (gastroesophageal reflux disease)     H/O  "meningitis     Hypertension 2018?    Varicella      Social History     Socioeconomic History    Marital status:    Tobacco Use    Smoking status: Former     Packs/day: 1.00     Years: 10.00     Additional pack years: 0.00     Total pack years: 10.00     Types: Other, Cigarettes     Quit date: 2018     Years since quittin.8    Smokeless tobacco: Former    Tobacco comments:     vape   Vaping Use    Vaping Use: Every day    Substances: Nicotine, THC    Devices: Disposable   Substance and Sexual Activity    Alcohol use: Not Currently     Alcohol/week: 2.0 standard drinks of alcohol     Comment: 2 per day    Drug use: Yes     Types: Marijuana    Sexual activity: Yes     Partners: Female     Birth control/protection: Condom, Pill   Other Topics Concern    Parent/sibling w/ CABG, MI or angioplasty before 65F 55M? No         Patient's past medical, surgical, social, and family histories were reviewed today and no changes are noted.    REVIEW OF SYSTEMS:  10 point ROS is negative other than symptoms noted above in HPI, Past Medical History or as stated below  Constitutional: NEGATIVE for fever, chills, change in weight  Skin: NEGATIVE for worrisome rashes, moles or lesions  GI/: NEGATIVE for bowel or bladder changes  Neuro: NEGATIVE for weakness, dizziness or paresthesias    OBJECTIVE:  /74   Ht 1.753 m (5' 9\")   Wt 92.5 kg (204 lb)   BMI 30.13 kg/m     General: healthy, alert and in no distress  HEENT: no scleral icterus or conjunctival erythema  Skin: no suspicious lesions or rash. No jaundice.  CV: regular rhythm by palpation  Resp: normal respiratory effort without conversational dyspnea   Psych: normal mood and affect  Gait: normal steady gait with appropriate coordination and balance  Neuro: normal light touch sensory exam of the bilateral upper extremities.    MSK:  RIGHT SHOULDER  Inspection:    no swelling, bruising, discoloration, or obvious deformity or asymmetry  Palpation:    " Tender about the anterior capsule. Remainder of bony and tendinous landmarks are nontender.  Active Range of Motion:     Abduction 1050, FF 1350, , IR SI joint.      Scapular dyskinesis absent  Strength:    Scapular plane abduction limited by pain,  ER painful, IR grossly intact, biceps not tested, triceps not tested  Special Tests:    Positive: Neer's, Joe', supraspinatus (empty can), and crossed arm adduction    Negative: drop arm/painful arc and [place]'s      Independent visualization of the below image:  No results found for this or any previous visit (from the past 24 hour(s)).        Albert Yeo MD [facility]

## 2023-12-11 ENCOUNTER — THERAPY VISIT (OUTPATIENT)
Dept: PHYSICAL THERAPY | Facility: CLINIC | Age: 33
End: 2023-12-11
Attending: FAMILY MEDICINE
Payer: COMMERCIAL

## 2023-12-11 DIAGNOSIS — M75.41 IMPINGEMENT SYNDROME, SHOULDER, RIGHT: ICD-10-CM

## 2023-12-11 DIAGNOSIS — M75.01 ADHESIVE CAPSULITIS OF RIGHT SHOULDER: ICD-10-CM

## 2023-12-11 PROCEDURE — 97110 THERAPEUTIC EXERCISES: CPT | Mod: GP | Performed by: PHYSICAL THERAPIST

## 2023-12-11 PROCEDURE — 97035 APP MDLTY 1+ULTRASOUND EA 15: CPT | Mod: GP | Performed by: PHYSICAL THERAPIST

## 2023-12-11 PROCEDURE — 97161 PT EVAL LOW COMPLEX 20 MIN: CPT | Mod: GP | Performed by: PHYSICAL THERAPIST

## 2023-12-11 NOTE — PROGRESS NOTES
PHYSICAL THERAPY EVALUATION  Type of Visit: Evaluation    See electronic medical record for Abuse and Falls Screening details.    Subjective     Pt reports an insidious onset of right shoulder pain in October of 2023.  Feels it started from doing too much work with his arms.  Greatest pain is when reaching behind his body.  Saw Dr Yeo and was referred to PT.          Presenting condition or subjective complaint: Frozen shoulder  Date of onset: 12/04/23 (MD order)    Relevant medical history: Depression; High blood pressure; Overweight; Sleep disorder like apnea; Smoking; Vision problems   Dates & types of surgery: Surgery for meningitis in 2005; Gall bladder removal in 2020    Prior diagnostic imaging/testing results:       Prior therapy history for the same diagnosis, illness or injury: No      Prior Level of Function  Transfers: Independent  Ambulation: Independent  ADL: Independent  IADL:     Living Environment  Social support: With a significant other or spouse   Type of home: 1 level   Stairs to enter the home: No       Ramp: No   Stairs inside the home: No       Help at home: None  Equipment owned: [employer]     Employment: Yes [email or web address]  Hobbies/Interests: [employer]    Patient goals for therapy: Be able to wash my back again    Pain assessment: See objective evaluation for additional pain details     Objective   SHOULDER EVALUATION  PAIN: Pain Level at Rest: 0/10  Pain Level with Use: 8/10  Pain Location: anterior shoulder  Pain Frequency: intermittent  Pain is Exacerbated By: reaching behind back, across, pushing up  Pain is Relieved By: massaging, stretching  INTEGUMENTARY (edema, incisions): WNL  POSTURE: WNL  GAIT:   Weightbearing Status:   Assistive Device(s):   Gait Deviations:   BALANCE/PROPRIOCEPTION:   WEIGHTBEARING ALIGNMENT:   ROM:   (Degrees) Left AROM Left PROM Right AROM  Right PROM   Shoulder Flexion 166 180 159 169   Shoulder Extension 54  43 +    Shoulder Abduction 159  145 +    Shoulder  Adduction       Shoulder Internal Rotation T6 62 S1 32 ++   Shoulder External Rotation  96  80 +   Shoulder Horizontal Abduction       Shoulder Horizontal Adduction       Shoulder Flexion ER       Shoulder Flexion IR       Elbow Extension       Elbow Flexion       Pain:   End feel:     STRENGTH:  5/5 throughout right shoulder with pain with Empty can, flexion  FLEXIBILITY:   SPECIAL TESTS:  Pain with impingement tests, posterior GH hypermobility to the left shoulder  PALPATION:  tender in anterior shoulder, biceps  JOINT MOBILITY:  left shoulder hypermobile posterior GHJ  CERVICAL SCREEN: WNL    Assessment & Plan   CLINICAL IMPRESSIONS  Medical Diagnosis: Impingement syndrome, shoulder, right  Adhesive capsulitis of right shoulder    Treatment Diagnosis: Right shoulder tendonitis/ possible adhesive capsulitis   Impression/Assessment: Patient is a 33 year old male with right shoulder complaints.  The following significant findings have been identified: Pain and Decreased ROM/flexibility. These impairments interfere with their ability to perform self care tasks, work tasks, recreational activities, and household chores as compared to previous level of function.     Clinical Decision Making (Complexity):  Clinical Presentation: Stable/Uncomplicated  Clinical Presentation Rationale: based on medical and personal factors listed in PT evaluation  Clinical Decision Making (Complexity): Low complexity    PLAN OF CARE  Treatment Interventions:  Modalities: Cryotherapy, Ultrasound  Interventions: Manual Therapy, Therapeutic Exercise    Long Term Goals     PT Goal 1  Goal Identifier: Reaching  Goal Description: Ability to reach behind the back without pain  Rationale: to maximize safety and independence with performance of ADLs and functional tasks  Target Date: 02/05/24      Frequency of Treatment: 1x/week  Duration of Treatment: 8 weeks    Recommended Referrals to Other Professionals:   Education Assessment:        Risks and  benefits of evaluation/treatment have been explained.   Patient/Family/caregiver agrees with Plan of Care.     Evaluation Time:     PT Eval, Low Complexity Minutes (48818): 20       Signing Clinician: ABIEL Chacon [facility]                                                                                   OUTPATIENT PHYSICAL THERAPY      PLAN OF TREATMENT FOR OUTPATIENT REHABILITATION   Patient's Last Name, First Name, Diogenes Duque YOB: 1990   Provider's Name   [facility]   Medical Record No.  [id number]     Onset Date: 12/04/23 (MD order)  Start of Care Date: 12/11/23     Medical Diagnosis:  Impingement syndrome, shoulder, right  Adhesive capsulitis of right shoulder      PT Treatment Diagnosis:  Right shoulder tendonitis/ possible adhesive capsulitis Plan of Treatment  Frequency/Duration: 1x/week/ 8 weeks    Certification date from 12/11/23 to 02/04/24         See note for plan of treatment details and functional goals     Jeff Wang, PT                         I CERTIFY THE NEED FOR THESE SERVICES FURNISHED UNDER        THIS PLAN OF TREATMENT AND WHILE UNDER MY CARE     (Physician attestation of this document indicates review and certification of the therapy plan).              Referring Provider:  Albert Yeo    Initial Assessment  See Epic Evaluation- Start of Care Date: 12/11/23

## 2023-12-18 ENCOUNTER — THERAPY VISIT (OUTPATIENT)
Dept: PHYSICAL THERAPY | Facility: CLINIC | Age: 33
End: 2023-12-18
Attending: FAMILY MEDICINE
Payer: COMMERCIAL

## 2023-12-18 DIAGNOSIS — M75.01 ADHESIVE CAPSULITIS OF RIGHT SHOULDER: Primary | ICD-10-CM

## 2023-12-18 PROCEDURE — 97010 HOT OR COLD PACKS THERAPY: CPT | Mod: GP | Performed by: PHYSICAL THERAPIST

## 2023-12-18 PROCEDURE — 97110 THERAPEUTIC EXERCISES: CPT | Mod: GP | Performed by: PHYSICAL THERAPIST

## 2023-12-18 PROCEDURE — 97035 APP MDLTY 1+ULTRASOUND EA 15: CPT | Mod: GP | Performed by: PHYSICAL THERAPIST

## 2023-12-18 PROCEDURE — 97140 MANUAL THERAPY 1/> REGIONS: CPT | Mod: GP | Performed by: PHYSICAL THERAPIST

## 2023-12-27 ENCOUNTER — THERAPY VISIT (OUTPATIENT)
Dept: PHYSICAL THERAPY | Facility: CLINIC | Age: 33
End: 2023-12-27
Attending: FAMILY MEDICINE
Payer: COMMERCIAL

## 2023-12-27 DIAGNOSIS — M75.01 ADHESIVE CAPSULITIS OF RIGHT SHOULDER: Primary | ICD-10-CM

## 2023-12-27 PROCEDURE — 97140 MANUAL THERAPY 1/> REGIONS: CPT | Mod: GP | Performed by: PHYSICAL THERAPIST

## 2023-12-27 PROCEDURE — 97110 THERAPEUTIC EXERCISES: CPT | Mod: GP | Performed by: PHYSICAL THERAPIST

## 2023-12-27 PROCEDURE — 97035 APP MDLTY 1+ULTRASOUND EA 15: CPT | Mod: GP | Performed by: PHYSICAL THERAPIST

## 2023-12-27 PROCEDURE — 97010 HOT OR COLD PACKS THERAPY: CPT | Mod: GP | Performed by: PHYSICAL THERAPIST

## 2023-12-27 NOTE — PROGRESS NOTES
DISCHARGE  Reason for Discharge: Pt is improving nicely and will continue with the HEP.    Equipment Issued: none    Discharge Plan: Patient to continue home program.    Referring Provider:  Albert Yeo        12/27/23 0500   Appointment Info   Signing clinician's name / credentials Jeff Wang PT   Total/Authorized Visits 6 (ET)   Visits Used 3   Medical Diagnosis Impingement syndrome, shoulder, right  Adhesive capsulitis of right shoulder   PT Tx Diagnosis Right shoulder tendonitis/ possible adhesive capsulitis   Quick Adds Certification   Progress Note/Certification   Start of Care Date 12/11/23   Onset of illness/injury or Date of Surgery 12/04/23  (MD order)   Therapy Frequency 1x/week   Predicted Duration 8 weeks   Certification date from 12/11/23   Certification date to 02/04/24   Progress Note Completed Date 12/27/23   PT Goal 1   Goal Identifier Reaching   Goal Description Ability to reach behind the back without pain   Rationale to maximize safety and independence with performance of ADLs and functional tasks   Goal Progress Improving.   Target Date 02/05/24   Subjective Report   Subjective Report Doing better.  Noticeably better ROM and less pain.  Can reach behind back much better.   Objective Measures   Objective Measures   (Passive IR=41.  MMT: EC is strong and painfree now.  Slight pain and good strength with flexion.  AROM is full and painfree.)   PT Modalities   PT Modalities Cryotherapy;Ultrasound   Cryotherapy   Cryotherapy Minutes (69357) 10   Ice -Type Pack   Duration 10 min   Location Right shoulder   Ultrasound   Ultrasound Minutes (17860) 8   Ultrasound -Type (does not include 3-5 min prep/cleanup time) Continuous   Intensity 1.5   Duration (does not include the 3-5 min set up/clean up time) 5 min   Frequency 1 MHz   Location Right anterior shoulder   Positioning sitting   Treatment Interventions (PT)   Interventions Manual Therapy;Therapeutic Procedure/Exercise   Therapeutic  "Procedure/Exercise   Therapeutic Procedures: strength, endurance, ROM, flexibillity minutes (30580) 25   Therapeutic Procedures Ther Proc 2;Ther Proc 3;Ther Proc 4;Ther Proc 5;Ther Proc 6;Ther Proc 7;Ther Proc 8   Ther Proc 1 IR and ER with TB   Ther Proc 1 - Details 2x15 yellow each   Ther Proc 2 SL posterior capsule stretch   Ther Proc 2 - Details 5x10\"   Ther Proc 3 Posterior shoulder stretch: horiz add with arm straight   Ther Proc 3 - Details 3x10\"   Ther Proc 4 UBE   Ther Proc 4 - Details 3 min f/b   Ther Proc 5 Pull downs   Ther Proc 5 - Details 2x15 green   Skilled Intervention Verbal and visual cueing   Ther Proc 6 Scaption   Ther Proc 6 - Details 2x15 2#   Ther Proc 7 Pec stretch in door   Ther Proc 7 - Details 3x10\"   Manual Therapy   Manual Therapy: Mobilization, MFR, MLD, friction massage minutes (93559) 8   Manual Therapy Manual Therapy 2;Manual Therapy 3   Manual Therapy 1 GH inf and post glides   Manual Therapy 1 - Details 1 min each   Manual Therapy 2 Manual pec minor stretch   Manual Therapy 2 - Details 5x10\"   Manual Therapy 3 Manual shoulder IR stretch   Manual Therapy 3 - Details 4x10\"   Total Session Time   Timed Code Treatment Minutes 41   Total Treatment Time (sum of timed and untimed services) 51       "

## 2024-01-23 ENCOUNTER — TRANSFERRED RECORDS (OUTPATIENT)
Dept: HEALTH INFORMATION MANAGEMENT | Facility: CLINIC | Age: 34
End: 2024-01-23
Payer: COMMERCIAL

## 2024-04-18 ENCOUNTER — VIRTUAL VISIT (OUTPATIENT)
Dept: FAMILY MEDICINE | Facility: CLINIC | Age: 34
End: 2024-04-18
Payer: COMMERCIAL

## 2024-04-18 DIAGNOSIS — F33.1 MODERATE EPISODE OF RECURRENT MAJOR DEPRESSIVE DISORDER (H): ICD-10-CM

## 2024-04-18 DIAGNOSIS — K64.4 EXTERNAL HEMORRHOIDS: Primary | ICD-10-CM

## 2024-04-18 PROCEDURE — 99214 OFFICE O/P EST MOD 30 MIN: CPT | Mod: 95 | Performed by: PHYSICIAN ASSISTANT

## 2024-04-18 RX ORDER — FLUTICASONE PROPIONATE 50 MCG
2 SPRAY, SUSPENSION (ML) NASAL DAILY
COMMUNITY
Start: 2024-01-23

## 2024-04-18 RX ORDER — HYDROCORTISONE 25 MG/G
CREAM TOPICAL 2 TIMES DAILY PRN
Qty: 30 G | Refills: 3 | Status: SHIPPED | OUTPATIENT
Start: 2024-04-18

## 2024-04-18 RX ORDER — BUPROPION HYDROCHLORIDE 300 MG/1
300 TABLET ORAL EVERY MORNING
Qty: 90 TABLET | Refills: 1 | Status: SHIPPED | OUTPATIENT
Start: 2024-04-18

## 2024-04-18 RX ORDER — FLUOXETINE 40 MG/1
80 CAPSULE ORAL DAILY
Qty: 180 CAPSULE | Refills: 1 | Status: SHIPPED | OUTPATIENT
Start: 2024-04-18

## 2024-04-18 NOTE — PROGRESS NOTES
"Parveen is a 33 year old who is being evaluated via a billable video visit.    How would you like to obtain your AVS? MyChart  If the video visit is dropped, the invitation should be resent by: Text to cell phone: [phone]  Will anyone else be joining your video visit? No      Assessment & Plan     (K64.4) External hemorrhoids  (primary encounter diagnosis)  Comment: discussed fiber and water  Referral placed. Team will fax this.   Plan: hydrocortisone, Perianal, (HYDROCORTISONE) 2.5         % cream, Adult Colorectal Surgery          Referral,     (F33.1) Moderate episode of recurrent major depressive disorder (H)  Comment: mood stable with meds.   Plan: buPROPion (WELLBUTRIN XL) 300 MG 24 hr tablet,         FLUoxetine (PROZAC) 40 MG capsule                    BMI  Estimated body mass index is 30.13 kg/m  as calculated from the following:    Height as of 12/4/23: 1.753 m (5' 9\").    Weight as of 12/4/23: 92.5 kg (204 lb).             Subjective   Parveen is a 33 year old, presenting for the following health issues:  No chief complaint on file.        4/18/2024     7:34 AM   Additional Questions   Roomed by Jamila LU       Video Start Time: 7:45 AM    HPI     Hemorrhoids  Onset/Duration: years  Description:   Jeanna-anal lump: YES--states they are external  Pain: YES  Itching: No  Accompanying Signs & Symptoms:  Blood in stool: YES-when wiping after a BM--bright red blood  Changes in stool pattern: No  History:   Any previous GI studies done:Colonoscopy-about 6 months ago, had couple polyps  Family History of colon cancer: unsure if colon cancer  Precipitating factors:   None  Alleviating factors:  None  Therapies tried and outcome: preparation H and suppositories       Depression   How are you doing with your depression since your last visit? No change  Are you having other symptoms that might be associated with depression? No  Have you had a significant life event?  No   Are you feeling anxious or having panic " attacks?   No  Do you have any concerns with your use of alcohol or other drugs? No    Social History     Tobacco Use    Smoking status: Former     Current packs/day: 0.00     Average packs/day: 1 pack/day for 10.0 years (10.0 ttl pk-yrs)     Types: Other, Cigarettes     Start date: 2008     Quit date: 2018     Years since quittin.2    Smokeless tobacco: Former    Tobacco comments:     vape   Vaping Use    Vaping status: Every Day    Substances: Nicotine, THC    Devices: Disposable   Substance Use Topics    Alcohol use: Not Currently     Alcohol/week: 2.0 standard drinks of alcohol     Comment: 2 per day    Drug use: Yes     Types: Marijuana         2022     9:16 AM 2023     6:47 AM 2023     5:07 PM   PHQ   PHQ-9 Total Score 5 5 5   Q9: Thoughts of better off dead/self-harm past 2 weeks Not at all Not at all Not at all         2022     9:19 AM 2023     6:48 AM 2023     5:07 PM   RADHA-7 SCORE   Total Score 4 (minimal anxiety) 3 (minimal anxiety)    Total Score 4 3 6           Review of Systems  Constitutional, HEENT, cardiovascular, pulmonary, gi and gu systems are negative, except as otherwise noted.      Objective           Vitals:  No vitals were obtained today due to virtual visit.    Physical Exam   GENERAL: alert and no distress  EYES: Eyes grossly normal to inspection.  No discharge or erythema, or obvious scleral/conjunctival abnormalities.  RESP: No audible wheeze, cough, or visible cyanosis.    SKIN: Visible skin clear. No significant rash, abnormal pigmentation or lesions.  NEURO: Cranial nerves grossly intact.  Mentation and speech appropriate for age.  PSYCH: Appropriate affect, tone, and pace of words          Video-Visit Details    Type of service:  Video Visit   Video End Time:7:54 AM  Originating Location (pt. Location): Home    Distant Location (provider location):  Off-site  Platform used for Video Visit: Abbi  Signed Electronically by: Annmarie SEWELL  ASHANTI Fernandez

## 2024-04-18 NOTE — PROGRESS NOTES
[facility] [place] Fax [phone]  Referrals [phone], [phone]    Faxed    Angelia dallas

## 2024-05-26 ENCOUNTER — HEALTH MAINTENANCE LETTER (OUTPATIENT)
Age: 34
End: 2024-05-26

## 2024-08-27 DIAGNOSIS — F33.1 MODERATE EPISODE OF RECURRENT MAJOR DEPRESSIVE DISORDER (H): ICD-10-CM

## 2024-08-27 RX ORDER — FLUOXETINE 40 MG/1
80 CAPSULE ORAL DAILY
Qty: 180 CAPSULE | Refills: 0 | OUTPATIENT
Start: 2024-08-27

## 2024-08-27 RX ORDER — BUPROPION HYDROCHLORIDE 300 MG/1
300 TABLET ORAL EVERY MORNING
Qty: 90 TABLET | Refills: 0 | OUTPATIENT
Start: 2024-08-27

## 2024-10-01 PROBLEM — E66.9 OBESITY, UNSPECIFIED: Status: RESOLVED | Noted: 2018-02-26 | Resolved: 2021-11-01

## 2024-10-23 DIAGNOSIS — F33.1 MODERATE EPISODE OF RECURRENT MAJOR DEPRESSIVE DISORDER (H): ICD-10-CM

## 2024-10-25 RX ORDER — BUPROPION HYDROCHLORIDE 300 MG/1
300 TABLET ORAL EVERY MORNING
Qty: 90 TABLET | Refills: 0 | Status: SHIPPED | OUTPATIENT
Start: 2024-10-25

## 2024-10-25 RX ORDER — FLUOXETINE 40 MG/1
80 CAPSULE ORAL DAILY
Qty: 180 CAPSULE | Refills: 0 | Status: SHIPPED | OUTPATIENT
Start: 2024-10-25

## 2024-10-25 NOTE — TELEPHONE ENCOUNTER
Annmarie no longer in office.  Will provide antonella refill.  Please help schedule patient to establish care or at least half med check.    YEYO Welsh CNP

## 2024-10-25 NOTE — TELEPHONE ENCOUNTER
Attempt x 1. Called pt and left a message to call back to [phone] and to ask to speak to a nurse.     When pt calls back,     Relay message from provider below.     Nabila DURHAM RN   Clinic RN  [facility]

## 2024-10-25 NOTE — TELEPHONE ENCOUNTER
Patient was last seen by Annmarie Fernandez PA-C in 2023 and 2024.     Juliana Paige RN 10/25/2024 12:54 PM  [facility]

## 2024-10-25 NOTE — TELEPHONE ENCOUNTER
No upcoming appt.  Needs new primary.  Routing to Annmarie[facility] [place] for antonella refill.  Route to care team to call to schedule establish care visit.  Diana Lopez RN

## 2024-11-26 ASSESSMENT — ANXIETY QUESTIONNAIRES
GAD7 TOTAL SCORE: 11
8. IF YOU CHECKED OFF ANY PROBLEMS, HOW DIFFICULT HAVE THESE MADE IT FOR YOU TO DO YOUR WORK, TAKE CARE OF THINGS AT HOME, OR GET ALONG WITH OTHER PEOPLE?: VERY DIFFICULT
4. TROUBLE RELAXING: NOT AT ALL
2. NOT BEING ABLE TO STOP OR CONTROL WORRYING: MORE THAN HALF THE DAYS
7. FEELING AFRAID AS IF SOMETHING AWFUL MIGHT HAPPEN: MORE THAN HALF THE DAYS
7. FEELING AFRAID AS IF SOMETHING AWFUL MIGHT HAPPEN: MORE THAN HALF THE DAYS
3. WORRYING TOO MUCH ABOUT DIFFERENT THINGS: MORE THAN HALF THE DAYS
GAD7 TOTAL SCORE: 11
IF YOU CHECKED OFF ANY PROBLEMS ON THIS QUESTIONNAIRE, HOW DIFFICULT HAVE THESE PROBLEMS MADE IT FOR YOU TO DO YOUR WORK, TAKE CARE OF THINGS AT HOME, OR GET ALONG WITH OTHER PEOPLE: VERY DIFFICULT
6. BECOMING EASILY ANNOYED OR IRRITABLE: MORE THAN HALF THE DAYS
GAD7 TOTAL SCORE: 11
1. FEELING NERVOUS, ANXIOUS, OR ON EDGE: NEARLY EVERY DAY
5. BEING SO RESTLESS THAT IT IS HARD TO SIT STILL: NOT AT ALL

## 2024-11-26 ASSESSMENT — PATIENT HEALTH QUESTIONNAIRE - PHQ9
10. IF YOU CHECKED OFF ANY PROBLEMS, HOW DIFFICULT HAVE THESE PROBLEMS MADE IT FOR YOU TO DO YOUR WORK, TAKE CARE OF THINGS AT HOME, OR GET ALONG WITH OTHER PEOPLE: VERY DIFFICULT
SUM OF ALL RESPONSES TO PHQ QUESTIONS 1-9: 8
SUM OF ALL RESPONSES TO PHQ QUESTIONS 1-9: 8

## 2024-11-27 ENCOUNTER — VIRTUAL VISIT (OUTPATIENT)
Dept: FAMILY MEDICINE | Facility: CLINIC | Age: 34
End: 2024-11-27
Payer: COMMERCIAL

## 2024-11-27 DIAGNOSIS — F33.1 MODERATE EPISODE OF RECURRENT MAJOR DEPRESSIVE DISORDER (H): ICD-10-CM

## 2024-11-27 PROCEDURE — G2211 COMPLEX E/M VISIT ADD ON: HCPCS | Mod: 95 | Performed by: FAMILY MEDICINE

## 2024-11-27 PROCEDURE — 99213 OFFICE O/P EST LOW 20 MIN: CPT | Mod: 95 | Performed by: FAMILY MEDICINE

## 2024-11-27 RX ORDER — FLUOXETINE 40 MG/1
80 CAPSULE ORAL DAILY
Qty: 180 CAPSULE | Refills: 1 | Status: SHIPPED | OUTPATIENT
Start: 2024-11-27

## 2024-11-27 RX ORDER — BUPROPION HYDROCHLORIDE 300 MG/1
300 TABLET ORAL EVERY MORNING
Qty: 90 TABLET | Refills: 1 | Status: SHIPPED | OUTPATIENT
Start: 2024-11-27

## 2024-11-27 ASSESSMENT — PATIENT HEALTH QUESTIONNAIRE - PHQ9: 5. POOR APPETITE OR OVEREATING: NOT AT ALL

## 2024-11-27 ASSESSMENT — ANXIETY QUESTIONNAIRES
3. WORRYING TOO MUCH ABOUT DIFFERENT THINGS: MORE THAN HALF THE DAYS
7. FEELING AFRAID AS IF SOMETHING AWFUL MIGHT HAPPEN: MORE THAN HALF THE DAYS
6. BECOMING EASILY ANNOYED OR IRRITABLE: MORE THAN HALF THE DAYS
GAD7 TOTAL SCORE: 10
2. NOT BEING ABLE TO STOP OR CONTROL WORRYING: MORE THAN HALF THE DAYS
IF YOU CHECKED OFF ANY PROBLEMS ON THIS QUESTIONNAIRE, HOW DIFFICULT HAVE THESE PROBLEMS MADE IT FOR YOU TO DO YOUR WORK, TAKE CARE OF THINGS AT HOME, OR GET ALONG WITH OTHER PEOPLE: SOMEWHAT DIFFICULT
5. BEING SO RESTLESS THAT IT IS HARD TO SIT STILL: NOT AT ALL
1. FEELING NERVOUS, ANXIOUS, OR ON EDGE: MORE THAN HALF THE DAYS

## 2024-11-27 NOTE — PROGRESS NOTES
"Parveen is a 34 year old who is being evaluated via a billable video visit.    How would you like to obtain your AVS? MyChart  If the video visit is dropped, the invitation should be resent by: Text to cell phone: [phone]  Will anyone else be joining your video visit? No      Assessment & Plan     Moderate episode of recurrent major depressive disorder (H)  Stable overall, feeling well.  Will continue current medications and follow up in 6 months or sooner as needed.  - buPROPion (WELLBUTRIN XL) 300 MG 24 hr tablet; Take 1 tablet (300 mg) by mouth every morning.  - FLUoxetine (PROZAC) 40 MG capsule; Take 2 capsules (80 mg) by mouth daily.    The longitudinal plan of care for the diagnosis(es)/condition(s) as documented were addressed during this visit. Due to the added complexity in care, I will continue to support Parveen in the subsequent management and with ongoing continuity of care.      BMI  Estimated body mass index is 30.13 kg/m  as calculated from the following:    Height as of 12/4/23: 1.753 m (5' 9\").    Weight as of 12/4/23: 92.5 kg (204 lb).       See Patient Instructions    Subjective   Parveen is a 34 year old, presenting for the following health issues:  Recheck Medication (buPROPion (WELLBUTRIN XL) 300 MG 24 hr tablet and FLUoxetine (PROZAC) 40 MG capsule)        11/27/2024     4:10 PM   Additional Questions   Roomed by rena lee   Accompanied by self     Video Start Time: 4:23 PM    History of Present Illness       Mental Health Follow-up:  Patient presents to follow-up on Depression & Anxiety.Patient's depression since last visit has been:  Medium  The patient is not having other symptoms associated with depression.  Patient's anxiety since last visit has been:  Medium  The patient is not having other symptoms associated with anxiety.  Any significant life events: No  Patient is feeling anxious or having panic attacks.  Patient has no concerns about alcohol or drug use.    He eats 0-1 servings of fruits " and vegetables daily.He consumes 6 sweetened beverage(s) daily.He exercises with enough effort to increase his heart rate 60 or more minutes per day.  He exercises with enough effort to increase his heart rate 4 days per week.   He is taking medications regularly.       Depression and Anxiety   How are you doing with your depression since your last visit? Worsened  How are you doing with your anxiety since your last visit?  No change  Are you having other symptoms that might be associated with depression or anxiety? No  Have you had a significant life event? No   Do you have any concerns with your use of alcohol or other drugs? No    Has been a rough month for him, overall he feels like he is doing good though.  Weather changing also is hard on him.    Social History     Tobacco Use    Smoking status: Former     Current packs/day: 0.00     Average packs/day: 1 pack/day for 10.0 years (10.0 ttl pk-yrs)     Types: Other, Cigarettes     Start date: 2008     Quit date: 2018     Years since quittin.8    Smokeless tobacco: Former    Tobacco comments:     vape   Vaping Use    Vaping status: Every Day    Substances: Nicotine, THC    Devices: Disposable   Substance Use Topics    Alcohol use: Not Currently     Alcohol/week: 2.0 standard drinks of alcohol     Comment: 2 per day    Drug use: Yes     Types: Marijuana         2023     5:07 PM 2024     6:28 PM 2024     4:15 PM   PHQ   PHQ-9 Total Score 5 8  6   Q9: Thoughts of better off dead/self-harm past 2 weeks Not at all Not at all  Not at all       Patient-reported         2023     5:07 PM 2024     6:28 PM 2024     4:15 PM   RADHA-7 SCORE   Total Score  11 (moderate anxiety)    Total Score 6 11  10       Patient-reported         2024     4:15 PM   Last PHQ-9   1.  Little interest or pleasure in doing things 2   2.  Feeling down, depressed, or hopeless 2   3.  Trouble falling or staying asleep, or sleeping too much 0   4.   Feeling tired or having little energy 0   5.  Poor appetite or overeating 0   6.  Feeling bad about yourself 2   7.  Trouble concentrating 0   8.  Moving slowly or restless 0   Q9: Thoughts of better off dead/self-harm past 2 weeks 0   PHQ-9 Total Score 6   Difficulty at work, home, or with people Somewhat difficult         11/27/2024     4:15 PM   RADHA-7    1. Feeling nervous, anxious, or on edge 2   2. Not being able to stop or control worrying 2   3. Worrying too much about different things 2   4. Trouble relaxing 0   5. Being so restless that it is hard to sit still 0   6. Becoming easily annoyed or irritable 2   7. Feeling afraid, as if something awful might happen 2   RADHA-7 Total Score 10   If you checked any problems, how difficult have they made it for you to do your work, take care of things at home, or get along with other people? Somewhat difficult       Suicide Assessment Five-step Evaluation and Treatment (SAFE-T)    How many servings of fruits and vegetables do you eat daily?  0-1  On average, how many sweetened beverages do you drink each day (Examples: soda, juice, sweet tea, etc.  Do NOT count diet or artificially sweetened beverages)?   6  How many days per week do you exercise enough to make your heart beat faster? 7  How many minutes a day do you exercise enough to make your heart beat faster? 60 or more  How many days per week do you miss taking your medication? 0    Medication Followup of buPROPion (WELLBUTRIN XL) 300 MG 24 hr tablet  Medication Followup of FLUoxetine (PROZAC) 40 MG capsule    Current Outpatient Medications   Medication Sig Dispense Refill    buPROPion (WELLBUTRIN XL) 300 MG 24 hr tablet TAKE 1 TABLET BY MOUTH IN THE MORNING 90 tablet 0    Cholecalciferol (VITAMIN D-3 PO)       fish oil-omega-3 fatty acids 1000 MG capsule       FLUoxetine (PROZAC) 40 MG capsule Take 2 capsules by mouth once daily 180 capsule 0    hydrocortisone, Perianal, (HYDROCORTISONE) 2.5 % cream Place  "rectally 2 times daily as needed for hemorrhoids 30 g 3    MILK THISTLE PO Take 525 mg by mouth       OMEPRAZOLE PO       cyclobenzaprine (FLEXERIL) 10 MG tablet Take 1 tablet (10 mg) by mouth nightly as needed for muscle spasms (Patient not taking: Reported on 11/27/2024) 30 tablet 0    diclofenac (VOLTAREN) 75 MG EC tablet Take 1 tablet (75 mg) by mouth 2 times daily as needed for moderate pain (Patient not taking: Reported on 11/27/2024) 60 tablet 1    fluticasone (FLONASE) 50 MCG/ACT nasal spray Spray 2 sprays into both nostrils daily (Patient not taking: Reported on 11/27/2024)             Objective    Vitals - Patient Reported  Weight (Patient Reported): 83.9 kg (185 lb)  Height (Patient Reported): 175.3 cm (5' 9\")  BMI (Based on Pt Reported Ht/Wt): 27.32  Pain Score: No Pain (0)        Physical Exam   GENERAL: alert and no distress  EYES: Eyes grossly normal to inspection.  No discharge or erythema, or obvious scleral/conjunctival abnormalities.  RESP: No audible wheeze, cough, or visible cyanosis.    SKIN: Visible skin clear. No significant rash, abnormal pigmentation or lesions.  NEURO: Cranial nerves grossly intact.  Mentation and speech appropriate for age.  PSYCH: Appropriate affect, tone, and pace of words          Video-Visit Details    Type of service:  Video Visit   Video End Time:4:28 PM  Originating Location (pt. Location): Home    Distant Location (provider location):  On-site  Platform used for Video Visit: Abbi  Signed Electronically by: Ly Lockett MD    "

## 2024-11-28 ASSESSMENT — ANXIETY QUESTIONNAIRES: GAD7 TOTAL SCORE: 11

## 2024-11-28 ASSESSMENT — PATIENT HEALTH QUESTIONNAIRE - PHQ9: SUM OF ALL RESPONSES TO PHQ QUESTIONS 1-9: 6

## 2024-12-20 NOTE — ED PROVIDER NOTES
History   Chief Complaint:  Abdominal Pain      HPI   Diogenes Cody is a 31 year old male with recent diagnosis of cholecystitis currently scheduled for a cholecystectomy with Dr. Rausch on 6/15 who presents for evaluation of abdominal pain. Approximately two days ago the patient started to develop intermittent pain across his upper abdomen that he believes is related to his gallbladder. Since then each episode of pain has been worse than the last. Tonight his pain worsened and he started to develop nausea and vomiting as well. He was unable to tolerate the pain at home, prompting him to come into the ED for evaluation. Upon evaluation in the ED he reports that his pain has improved but he would not feel comfortable going home due to the recurring nature of the pain.     Right Upper Quadrant Ultrasound 5/10/2021:  IMPRESSION:    1.  Gallbladder wall thickening, hyperemia, and intraluminal small stones/sludge. No pericholecystic fluid and sonographic Pizano's sign is negative. Findings are indeterminate for acute versus chronic cholecystitis. Consider HIDA scan to further evaluate.  2.  Borderline dilation of the common hepatic duct to 7 mm. No visualized choledocholithiasis.  3.  Increased echogenicity within the liver compatible with steatosis.    Review of Systems   Gastrointestinal: Positive for abdominal pain, nausea and vomiting.   All other systems reviewed and are negative.      Allergies:  Amoxicillin  Cefaclor  Sulfa Drugs  Sulfasalazine  Vantin     Medications:  Wellbutrin XL   Prozac   Omeprazole     Past Medical History:    Depression   GERD   Hypertension   Varicella   Meningitis   Cholecystitis      Past Surgical History:    Adenoidectomy  Tympanostomy tube placement      Family History:    Hashimoto's thyroiditis - Mother   Alcohol abuse - Father   Depression - Sister  Anxiety - Sister   Bipolar disorder - Sister     Social History:  The patient presents to the ED alone.     Physical Exam   Will do, should this be lab?        Patient Vitals for the past 24 hrs:   BP Temp Temp src Pulse Resp SpO2   06/06/21 0515 113/72 -- -- 64 -- 94 %   06/06/21 0500 112/61 -- -- 62 -- 94 %   06/06/21 0445 112/71 -- -- 63 -- 95 %   06/06/21 0430 114/64 -- -- 66 -- 95 %   06/05/21 2345 129/87 98.4  F (36.9  C) Oral 81 20 97 %       Physical Exam  Nursing note and vitals reviewed.  General: Patient is alert and interactive when I enter the room  Head:  The scalp, face, and head appear normal  Eyes:  Conjunctivae are normal  ENT:    The nose is normal    Pinnae are normal  Neck:  Trachea midline  CV:  Normal rate  Resp:  No respiratory distress   GI:  Soft, TTP RUQ without guarding/rigidity.   Musc:  Normal muscular tone  Skin:  No rash or lesions noted  Neuro: Speech is normal and fluent. Face is symmetric. Moving all extremities well.   Psych:  Awake. Alert.  Normal affect.  Appropriate interactions.      Emergency Department Course     Laboratory:   CBC: WBC 8.2, HGB 15.3,    CMP: WNL (Creatinine 1.18)   Lipase: 53 low    Asymptomatic COVID19 Virus PCR by nasopharyngeal swab pending       Emergency Department Course:  Reviewed:  I reviewed nursing notes, vitals and past medical history    Assessments:  0438:  I obtained history and examined the patient as noted above.     Consults:   0519: I spoke with Dr. Kaplan of the hospitalist service regarding patient's presentation, findings, and plan of care.      Disposition:  The patient was admitted to the hospital under the care of Dr. Kaplan.       Impression & Plan     Medical Decision Making:  Patient presents with known cholecystitis with scheduled surgery in 9 days.  He reports worsening pain over the last several days and presents to the emergency department tonight requesting definitive therapy given the escalation of his pain.  Despite him not being in much pain currently, he is concerned about his escalating pain, and requests admission and surgical consultation.  Discussed with patient  that we could admit him to obs for surgical consultation which he is interested in.  Discussed with Dr. Kaplan who accepts patient for admission.     Covid-19  Diogenes Cody was evaluated during a global COVID-19 pandemic, which necessitated consideration that the patient might be at risk for infection with the SARS-CoV-2 virus that causes COVID-19.   Applicable protocols for evaluation were followed during the patient's care.   COVID-19 was considered as part of the patient's evaluation. The plan for testing is:  a test was obtained during this visit.     Diagnosis:    ICD-10-CM    1. Acute cholecystitis  K81.0         Scribe Disclosure:  I, Xavier Lee, am serving as a scribe at 4:38 AM on 6/6/2021 to document services personally performed by Mervin Menjivar MD based on my observations and the provider's statements to me.         Mervin Menjivar MD  06/06/21 1002

## 2025-01-06 ENCOUNTER — OFFICE VISIT (OUTPATIENT)
Dept: URGENT CARE | Facility: URGENT CARE | Age: 35
End: 2025-01-06
Payer: COMMERCIAL

## 2025-01-06 VITALS
SYSTOLIC BLOOD PRESSURE: 122 MMHG | HEIGHT: 69 IN | DIASTOLIC BLOOD PRESSURE: 83 MMHG | TEMPERATURE: 98.1 F | WEIGHT: 200 LBS | BODY MASS INDEX: 29.62 KG/M2 | HEART RATE: 94 BPM | RESPIRATION RATE: 16 BRPM

## 2025-01-06 DIAGNOSIS — H66.002 ACUTE SUPPURATIVE OTITIS MEDIA OF LEFT EAR WITHOUT SPONTANEOUS RUPTURE OF TYMPANIC MEMBRANE, RECURRENCE NOT SPECIFIED: ICD-10-CM

## 2025-01-06 DIAGNOSIS — H92.02 LEFT EAR PAIN: Primary | ICD-10-CM

## 2025-01-06 LAB
DEPRECATED S PYO AG THROAT QL EIA: NEGATIVE
S PYO DNA THROAT QL NAA+PROBE: NOT DETECTED

## 2025-01-06 PROCEDURE — 87651 STREP A DNA AMP PROBE: CPT | Performed by: NURSE PRACTITIONER

## 2025-01-06 PROCEDURE — 99214 OFFICE O/P EST MOD 30 MIN: CPT | Performed by: NURSE PRACTITIONER

## 2025-01-06 RX ORDER — AZITHROMYCIN 250 MG/1
TABLET, FILM COATED ORAL
Qty: 6 TABLET | Refills: 0 | Status: SHIPPED | OUTPATIENT
Start: 2025-01-06 | End: 2025-01-11

## 2025-01-06 NOTE — PATIENT INSTRUCTIONS
Results for orders placed or performed in visit on 01/06/25   Streptococcus A Rapid Screen w/Reflex to PCR - Clinic Collect     Status: Normal    Specimen: Throat; Swab   Result Value Ref Range    Group A Strep antigen Negative Negative       negativeRST   Azithromycin for left otitis.    covid  swab pending.    Push fluids  Lots of handwashing.   Ibuprofen as needed for fever or pain  Delsym or dayquil/nyquil for cough as needed     Rest as able.   Will call if any other labs positive.    F/u in the clinic if symptoms persist or worsen.

## 2025-01-06 NOTE — PROGRESS NOTES
"Assessment & Plan     Ear pain  - Streptococcus A Rapid Screen w/Reflex to PCR - Clinic Collect  - Group A Streptococcus PCR Throat Swab    Acute suppurative otitis media of left ear without spontaneous rupture of tympanic membrane, recurrence not specified  - azithromycin (ZITHROMAX) 250 MG tablet  Dispense: 6 tablet; Refill: 0       Patient Instructions     Results for orders placed or performed in visit on 01/06/25   Streptococcus A Rapid Screen w/Reflex to PCR - Clinic Collect     Status: Normal    Specimen: Throat; Swab   Result Value Ref Range    Group A Strep antigen Negative Negative       negativeRST   Azithromycin for left otitis.    covid  swab pending.    Push fluids  Lots of handwashing.   Ibuprofen as needed for fever or pain  Delsym or dayquil/nyquil for cough as needed     Rest as able.   Will call if any other labs positive.    F/u in the clinic if symptoms persist or worsen.        Return in about 1 week (around 1/13/2025) for with regular provider if symptoms persist.    [facility] [place]    Chester Saini is a 34 year old male who presents to clinic today for the following health issues:  Chief Complaint   Patient presents with    Urgent Care     Ear pain left side x 1 day.  Cough x 1 day. Covid test not done at home, was exposed to strep.      HPI    URI Adult    Onset of symptoms was 1 day(s) ago.  Course of illness is worsening.    Severity moderately severe  Current and Associated symptoms: ear pain left  Treatment measures tried include Tylenol/Ibuprofen and Fluids.  Predisposing factors include HX of recurrent OM.      Review of Systems  Constitutional, HEENT, cardiovascular, pulmonary, GI, , musculoskeletal, neuro, skin, endocrine and psych systems are negative, except as otherwise noted.      Objective    /83   Pulse 94   Temp 98.1  F (36.7  C) (Tympanic)   Resp 16   Ht 1.753 m (5' 9\")   Wt 90.7 kg (200 lb)   BMI 29.53 " kg/m    Physical Exam   GENERAL: alert and no distress  EYES: Eyes grossly normal to inspection, PERRL and conjunctivae and sclerae normal  HENT: normal cephalic/atraumatic, right ear: normal: no effusions, no erythema, normal landmarks, left ear: erythematous, bulging membrane, and mucopurulent effusion, nose and mouth without ulcers or lesions, oropharynx clear, and oral mucous membranes moist  NECK: no adenopathy, no asymmetry, masses, or scars  RESP: lungs clear to auscultation - no rales, rhonchi or wheezes  CV: regular rate and rhythm, normal S1 S2, no S3 or S4, no murmur, click or rub, no peripheral edema

## 2025-06-03 DIAGNOSIS — K64.4 EXTERNAL HEMORRHOIDS: ICD-10-CM

## 2025-06-03 DIAGNOSIS — F33.1 MODERATE EPISODE OF RECURRENT MAJOR DEPRESSIVE DISORDER (H): ICD-10-CM

## 2025-06-05 RX ORDER — FLUOXETINE HYDROCHLORIDE 40 MG/1
80 CAPSULE ORAL DAILY
Qty: 180 CAPSULE | Refills: 0 | Status: SHIPPED | OUTPATIENT
Start: 2025-06-05

## 2025-06-05 RX ORDER — BUPROPION HYDROCHLORIDE 300 MG/1
300 TABLET ORAL EVERY MORNING
Qty: 90 TABLET | Refills: 0 | Status: SHIPPED | OUTPATIENT
Start: 2025-06-05

## 2025-06-14 ENCOUNTER — HEALTH MAINTENANCE LETTER (OUTPATIENT)
Age: 35
End: 2025-06-14

## 2025-06-17 RX ORDER — HYDROCORTISONE 25 MG/G
CREAM TOPICAL
Qty: 28 G | Refills: 0 | OUTPATIENT
Start: 2025-06-17

## (undated) DEVICE — EVAC SYSTEM CLEAR FLOW SC082500

## (undated) DEVICE — LINEN HALF SHEET 5512

## (undated) DEVICE — SU MONOCRYL 4-0 PS-2 27" UND Y426H

## (undated) DEVICE — DRAPE X-RAY TUBE 00-901169-01-OEC

## (undated) DEVICE — SOL NACL 0.9% IRRIG 3000ML BAG 2B7477

## (undated) DEVICE — SUCTION IRR STRYKERFLOW II W/TIP 250-070-520

## (undated) DEVICE — PREP POVIDONE-IODINE 7.5% SCRUB 4OZ BOTTLE MDS093945

## (undated) DEVICE — SOL NACL 0.9% IRRIG 1000ML BOTTLE 2F7124

## (undated) DEVICE — ENDO TROCAR FIRST ENTRY KII FIOS Z-THRD 05X100MM CTF03

## (undated) DEVICE — ESU CORD MONOPOLAR 10'  E0510

## (undated) DEVICE — ENDO POUCH UNIV RETRIEVAL SYSTEM INZII 10MM CD001

## (undated) DEVICE — ESU PENCIL W/HOLSTER E2350H

## (undated) DEVICE — GLOVE PROTEXIS POWDER FREE 6.5 ORTHOPEDIC 2D73ET65

## (undated) DEVICE — BLADE CLIPPER 3M 9670

## (undated) DEVICE — ENDO TROCAR SLEEVE KII Z-THREADED 05X100MM CTS02

## (undated) DEVICE — LINEN TOWEL PACK X5 5464

## (undated) DEVICE — PREP POVIDONE IODINE SOLUTION 10% 4OZ BOTTLE 29906-004

## (undated) DEVICE — SU VICRYL 0 CT-2 CR 8X18" J727D

## (undated) DEVICE — SURGICEL HEMOSTAT 3X4" NUKNIT 1943

## (undated) DEVICE — ENDO TROCAR OPTICAL ACCESS KII Z-THRD 12X100MM C0R85

## (undated) DEVICE — DRAPE C-ARM 60X42" 1013

## (undated) DEVICE — PREP SKIN SCRUB TRAY 4461A

## (undated) DEVICE — LINEN FULL SHEET 5511

## (undated) DEVICE — LINEN POUCH DBL 5427

## (undated) DEVICE — Device

## (undated) DEVICE — ESU ELEC BLADE 2.75" COATED/INSULATED E1455

## (undated) DEVICE — DECANTER BAG 2002S

## (undated) DEVICE — BAG CLEAR TRASH 1.3M 39X33" P4040C

## (undated) DEVICE — DECANTER VIAL 2006S

## (undated) DEVICE — DRAPE LEGGINGS 8421

## (undated) DEVICE — RAD RX ISOVUE 300 (50ML) 61% IOPAMIDOL CHARGE PER ML

## (undated) DEVICE — CLIP APPLIER ENDO 5MM M/L LIGAMAX EL5ML

## (undated) DEVICE — ESU GROUND PAD ADULT W/CORD E7507

## (undated) RX ORDER — LABETALOL HYDROCHLORIDE 5 MG/ML
INJECTION, SOLUTION INTRAVENOUS
Status: DISPENSED
Start: 2021-06-06

## (undated) RX ORDER — FENTANYL CITRATE 50 UG/ML
INJECTION, SOLUTION INTRAMUSCULAR; INTRAVENOUS
Status: DISPENSED
Start: 2021-06-06

## (undated) RX ORDER — BUPIVACAINE HYDROCHLORIDE 2.5 MG/ML
INJECTION, SOLUTION EPIDURAL; INFILTRATION; INTRACAUDAL
Status: DISPENSED
Start: 2021-06-06

## (undated) RX ORDER — CEFAZOLIN SODIUM 2 G/100ML
INJECTION, SOLUTION INTRAVENOUS
Status: DISPENSED
Start: 2021-06-06

## (undated) RX ORDER — HYDROCODONE BITARTRATE AND ACETAMINOPHEN 5; 325 MG/1; MG/1
TABLET ORAL
Status: DISPENSED
Start: 2021-06-06

## (undated) RX ORDER — CLINDAMYCIN PHOSPHATE 900 MG/50ML
INJECTION, SOLUTION INTRAVENOUS
Status: DISPENSED
Start: 2021-06-06

## (undated) RX ORDER — CELECOXIB 200 MG/1
CAPSULE ORAL
Status: DISPENSED
Start: 2021-06-06

## (undated) RX ORDER — ACETAMINOPHEN 325 MG/1
TABLET ORAL
Status: DISPENSED
Start: 2021-06-06